# Patient Record
Sex: FEMALE | Race: WHITE | NOT HISPANIC OR LATINO | Employment: FULL TIME | URBAN - METROPOLITAN AREA
[De-identification: names, ages, dates, MRNs, and addresses within clinical notes are randomized per-mention and may not be internally consistent; named-entity substitution may affect disease eponyms.]

---

## 2017-03-30 ENCOUNTER — OFFICE VISIT (OUTPATIENT)
Dept: URGENT CARE | Facility: MEDICAL CENTER | Age: 27
End: 2017-03-30

## 2017-03-30 PROCEDURE — G0382 LEV 3 HOSP TYPE B ED VISIT: HCPCS

## 2017-04-28 ENCOUNTER — ALLSCRIPTS OFFICE VISIT (OUTPATIENT)
Dept: OTHER | Facility: OTHER | Age: 27
End: 2017-04-28

## 2018-01-14 VITALS
SYSTOLIC BLOOD PRESSURE: 109 MMHG | BODY MASS INDEX: 23.75 KG/M2 | HEIGHT: 60 IN | DIASTOLIC BLOOD PRESSURE: 66 MMHG | WEIGHT: 121 LBS

## 2020-02-26 ENCOUNTER — OCCMED (OUTPATIENT)
Dept: URGENT CARE | Facility: CLINIC | Age: 30
End: 2020-02-26
Payer: OTHER MISCELLANEOUS

## 2020-02-26 ENCOUNTER — APPOINTMENT (OUTPATIENT)
Dept: RADIOLOGY | Facility: CLINIC | Age: 30
End: 2020-02-26
Payer: OTHER MISCELLANEOUS

## 2020-02-26 DIAGNOSIS — S59.912A INJURY OF LEFT FOREARM, INITIAL ENCOUNTER: ICD-10-CM

## 2020-02-26 DIAGNOSIS — S51.812A LACERATION OF SKIN OF LEFT FOREARM, INITIAL ENCOUNTER: Primary | ICD-10-CM

## 2020-02-26 PROCEDURE — 99283 EMERGENCY DEPT VISIT LOW MDM: CPT

## 2020-02-26 PROCEDURE — 90471 IMMUNIZATION ADMIN: CPT

## 2020-02-26 PROCEDURE — G0382 LEV 3 HOSP TYPE B ED VISIT: HCPCS

## 2020-02-26 PROCEDURE — 90715 TDAP VACCINE 7 YRS/> IM: CPT

## 2020-02-26 PROCEDURE — 73090 X-RAY EXAM OF FOREARM: CPT

## 2020-02-26 RX ORDER — LIDOCAINE HYDROCHLORIDE 10 MG/ML
5 INJECTION, SOLUTION EPIDURAL; INFILTRATION; INTRACAUDAL; PERINEURAL ONCE
Status: COMPLETED | OUTPATIENT
Start: 2020-02-26 | End: 2020-02-26

## 2020-02-26 RX ORDER — GINSENG 100 MG
1 CAPSULE ORAL ONCE
Status: COMPLETED | OUTPATIENT
Start: 2020-02-26 | End: 2020-02-26

## 2020-02-26 RX ADMIN — LIDOCAINE HYDROCHLORIDE 5 ML: 10 INJECTION, SOLUTION EPIDURAL; INFILTRATION; INTRACAUDAL; PERINEURAL at 14:55

## 2020-02-26 RX ADMIN — Medication 1 SMALL APPLICATION: at 14:55

## 2020-02-26 NOTE — PROGRESS NOTES
Medical Record Disclaimer: This chart is being administratively closed as incomplete by [facility] Compliance Department  The provider responsible for completing the chart has left the organization and is not available to complete the chart  This encounter was created for [facility] orders only

## 2020-02-28 ENCOUNTER — OCCMED (OUTPATIENT)
Dept: URGENT CARE | Facility: CLINIC | Age: 30
End: 2020-02-28
Payer: OTHER MISCELLANEOUS

## 2020-02-28 DIAGNOSIS — S51.812D LACERATION OF FOREARM, LEFT, SUBSEQUENT ENCOUNTER: Primary | ICD-10-CM

## 2020-02-28 PROCEDURE — 99213 OFFICE O/P EST LOW 20 MIN: CPT | Performed by: PHYSICIAN ASSISTANT

## 2020-03-04 ENCOUNTER — OCCMED (OUTPATIENT)
Dept: URGENT CARE | Facility: CLINIC | Age: 30
End: 2020-03-04
Payer: OTHER MISCELLANEOUS

## 2020-03-04 DIAGNOSIS — S51.812D LACERATION OF FOREARM, LEFT, SUBSEQUENT ENCOUNTER: Primary | ICD-10-CM

## 2020-03-04 PROCEDURE — 99213 OFFICE O/P EST LOW 20 MIN: CPT | Performed by: PREVENTIVE MEDICINE

## 2020-03-11 ENCOUNTER — OCCMED (OUTPATIENT)
Dept: URGENT CARE | Facility: CLINIC | Age: 30
End: 2020-03-11
Payer: OTHER MISCELLANEOUS

## 2020-03-11 DIAGNOSIS — S51.812D LACERATION OF FOREARM, LEFT, SUBSEQUENT ENCOUNTER: Primary | ICD-10-CM

## 2020-03-11 PROCEDURE — 99213 OFFICE O/P EST LOW 20 MIN: CPT | Performed by: PREVENTIVE MEDICINE

## 2020-10-08 ENCOUNTER — ANNUAL EXAM (OUTPATIENT)
Dept: OBGYN CLINIC | Facility: MEDICAL CENTER | Age: 30
End: 2020-10-08
Payer: COMMERCIAL

## 2020-10-08 VITALS
BODY MASS INDEX: 25.09 KG/M2 | DIASTOLIC BLOOD PRESSURE: 72 MMHG | WEIGHT: 127.8 LBS | HEIGHT: 60 IN | TEMPERATURE: 99.1 F | SYSTOLIC BLOOD PRESSURE: 120 MMHG

## 2020-10-08 DIAGNOSIS — Z91.89 HIGH-RISK EXPOSURE TO INFECTIOUS PATIENT: ICD-10-CM

## 2020-10-08 DIAGNOSIS — Z20.9 HIGH-RISK EXPOSURE TO INFECTIOUS PATIENT: ICD-10-CM

## 2020-10-08 DIAGNOSIS — Z12.4 SCREENING FOR MALIGNANT NEOPLASM OF THE CERVIX: ICD-10-CM

## 2020-10-08 DIAGNOSIS — Z01.419 ROUTINE GYNECOLOGICAL EXAMINATION: Primary | ICD-10-CM

## 2020-10-08 PROCEDURE — 87624 HPV HI-RISK TYP POOLED RSLT: CPT | Performed by: ADVANCED PRACTICE MIDWIFE

## 2020-10-08 PROCEDURE — G0145 SCR C/V CYTO,THINLAYER,RESCR: HCPCS | Performed by: PATHOLOGY

## 2020-10-08 PROCEDURE — 99395 PREV VISIT EST AGE 18-39: CPT | Performed by: ADVANCED PRACTICE MIDWIFE

## 2020-10-08 PROCEDURE — G0124 SCREEN C/V THIN LAYER BY MD: HCPCS | Performed by: PATHOLOGY

## 2020-10-20 LAB
LAB AP GYN PRIMARY INTERPRETATION: ABNORMAL
Lab: ABNORMAL
PATH INTERP SPEC-IMP: ABNORMAL

## 2020-10-24 LAB
HPV HR 12 DNA CVX QL NAA+PROBE: POSITIVE
HPV16 DNA CVX QL NAA+PROBE: NEGATIVE
HPV18 DNA CVX QL NAA+PROBE: NEGATIVE

## 2020-10-27 ENCOUNTER — TELEPHONE (OUTPATIENT)
Dept: OBGYN CLINIC | Facility: MEDICAL CENTER | Age: 30
End: 2020-10-27

## 2020-11-04 ENCOUNTER — PROCEDURE VISIT (OUTPATIENT)
Dept: OBGYN CLINIC | Facility: MEDICAL CENTER | Age: 30
End: 2020-11-04
Payer: COMMERCIAL

## 2020-11-04 VITALS
DIASTOLIC BLOOD PRESSURE: 76 MMHG | BODY MASS INDEX: 24.99 KG/M2 | SYSTOLIC BLOOD PRESSURE: 124 MMHG | HEIGHT: 60 IN | WEIGHT: 127.3 LBS | TEMPERATURE: 99.4 F

## 2020-11-04 DIAGNOSIS — R87.610 ATYPICAL SQUAMOUS CELLS OF UNDETERMINED SIGNIFICANCE ON CYTOLOGIC SMEAR OF CERVIX (ASC-US): Primary | ICD-10-CM

## 2020-11-04 PROBLEM — R87.810 ATYPICAL SQUAMOUS CELL CHANGES OF UNDETERMINED SIGNIFICANCE (ASCUS) ON CERVICAL CYTOLOGY WITH POSITIVE HIGH RISK HUMAN PAPILLOMA VIRUS (HPV): Status: ACTIVE | Noted: 2020-11-04

## 2020-11-04 LAB — SL AMB POCT URINE HCG: NORMAL

## 2020-11-04 PROCEDURE — 57452 EXAM OF CERVIX W/SCOPE: CPT | Performed by: ADVANCED PRACTICE MIDWIFE

## 2020-11-04 PROCEDURE — 81025 URINE PREGNANCY TEST: CPT | Performed by: ADVANCED PRACTICE MIDWIFE

## 2020-11-10 ENCOUNTER — OFFICE VISIT (OUTPATIENT)
Dept: URGENT CARE | Facility: CLINIC | Age: 30
End: 2020-11-10
Payer: COMMERCIAL

## 2020-11-10 VITALS
HEART RATE: 84 BPM | SYSTOLIC BLOOD PRESSURE: 110 MMHG | DIASTOLIC BLOOD PRESSURE: 78 MMHG | TEMPERATURE: 96.5 F | RESPIRATION RATE: 16 BRPM | OXYGEN SATURATION: 99 % | HEIGHT: 60 IN | BODY MASS INDEX: 25.17 KG/M2 | WEIGHT: 128.2 LBS

## 2020-11-10 DIAGNOSIS — M25.562 POSTERIOR LEFT KNEE PAIN: Primary | ICD-10-CM

## 2020-11-10 PROCEDURE — G0382 LEV 3 HOSP TYPE B ED VISIT: HCPCS | Performed by: FAMILY MEDICINE

## 2020-11-11 ENCOUNTER — APPOINTMENT (OUTPATIENT)
Dept: RADIOLOGY | Facility: CLINIC | Age: 30
End: 2020-11-11
Payer: COMMERCIAL

## 2020-11-11 ENCOUNTER — OFFICE VISIT (OUTPATIENT)
Dept: OBGYN CLINIC | Facility: CLINIC | Age: 30
End: 2020-11-11
Payer: COMMERCIAL

## 2020-11-11 VITALS
DIASTOLIC BLOOD PRESSURE: 72 MMHG | TEMPERATURE: 97.5 F | HEIGHT: 60 IN | SYSTOLIC BLOOD PRESSURE: 116 MMHG | BODY MASS INDEX: 25.13 KG/M2 | WEIGHT: 128 LBS

## 2020-11-11 DIAGNOSIS — M25.562 LEFT KNEE PAIN, UNSPECIFIED CHRONICITY: ICD-10-CM

## 2020-11-11 DIAGNOSIS — M25.562 POSTERIOR LEFT KNEE PAIN: ICD-10-CM

## 2020-11-11 DIAGNOSIS — S76.312A STRAIN OF LEFT HAMSTRING MUSCLE, INITIAL ENCOUNTER: Primary | ICD-10-CM

## 2020-11-11 PROCEDURE — 73564 X-RAY EXAM KNEE 4 OR MORE: CPT

## 2020-11-11 PROCEDURE — 99203 OFFICE O/P NEW LOW 30 MIN: CPT | Performed by: ORTHOPAEDIC SURGERY

## 2020-11-12 ENCOUNTER — EVALUATION (OUTPATIENT)
Dept: PHYSICAL THERAPY | Facility: CLINIC | Age: 30
End: 2020-11-12
Payer: COMMERCIAL

## 2020-11-12 DIAGNOSIS — S76.312A STRAIN OF LEFT HAMSTRING MUSCLE, INITIAL ENCOUNTER: ICD-10-CM

## 2020-11-12 PROCEDURE — 97161 PT EVAL LOW COMPLEX 20 MIN: CPT | Performed by: PHYSICAL THERAPIST

## 2020-11-12 PROCEDURE — 97110 THERAPEUTIC EXERCISES: CPT | Performed by: PHYSICAL THERAPIST

## 2020-11-16 ENCOUNTER — OFFICE VISIT (OUTPATIENT)
Dept: PHYSICAL THERAPY | Facility: CLINIC | Age: 30
End: 2020-11-16
Payer: COMMERCIAL

## 2020-11-16 DIAGNOSIS — S76.312A STRAIN OF LEFT HAMSTRING MUSCLE, INITIAL ENCOUNTER: Primary | ICD-10-CM

## 2020-11-16 PROCEDURE — 97110 THERAPEUTIC EXERCISES: CPT | Performed by: PHYSICAL THERAPIST

## 2020-11-16 PROCEDURE — 97140 MANUAL THERAPY 1/> REGIONS: CPT | Performed by: PHYSICAL THERAPIST

## 2020-11-19 ENCOUNTER — OFFICE VISIT (OUTPATIENT)
Dept: PHYSICAL THERAPY | Facility: CLINIC | Age: 30
End: 2020-11-19
Payer: COMMERCIAL

## 2020-11-19 DIAGNOSIS — S76.312A STRAIN OF LEFT HAMSTRING MUSCLE, INITIAL ENCOUNTER: Primary | ICD-10-CM

## 2020-11-19 PROCEDURE — 97110 THERAPEUTIC EXERCISES: CPT | Performed by: PHYSICAL THERAPIST

## 2020-11-19 PROCEDURE — 97140 MANUAL THERAPY 1/> REGIONS: CPT | Performed by: PHYSICAL THERAPIST

## 2020-11-23 ENCOUNTER — OFFICE VISIT (OUTPATIENT)
Dept: PHYSICAL THERAPY | Facility: CLINIC | Age: 30
End: 2020-11-23
Payer: COMMERCIAL

## 2020-11-23 DIAGNOSIS — S76.312A STRAIN OF LEFT HAMSTRING MUSCLE, INITIAL ENCOUNTER: Primary | ICD-10-CM

## 2020-11-23 PROCEDURE — 97110 THERAPEUTIC EXERCISES: CPT | Performed by: PHYSICAL THERAPIST

## 2020-11-23 PROCEDURE — 97140 MANUAL THERAPY 1/> REGIONS: CPT | Performed by: PHYSICAL THERAPIST

## 2020-11-27 ENCOUNTER — OFFICE VISIT (OUTPATIENT)
Dept: PHYSICAL THERAPY | Facility: CLINIC | Age: 30
End: 2020-11-27
Payer: COMMERCIAL

## 2020-11-27 DIAGNOSIS — S76.312A STRAIN OF LEFT HAMSTRING MUSCLE, INITIAL ENCOUNTER: Primary | ICD-10-CM

## 2020-11-27 PROCEDURE — 97110 THERAPEUTIC EXERCISES: CPT | Performed by: PHYSICAL THERAPIST

## 2020-11-27 PROCEDURE — 97140 MANUAL THERAPY 1/> REGIONS: CPT | Performed by: PHYSICAL THERAPIST

## 2020-11-30 ENCOUNTER — OFFICE VISIT (OUTPATIENT)
Dept: PHYSICAL THERAPY | Facility: CLINIC | Age: 30
End: 2020-11-30
Payer: COMMERCIAL

## 2020-11-30 DIAGNOSIS — S76.312A STRAIN OF LEFT HAMSTRING MUSCLE, INITIAL ENCOUNTER: Primary | ICD-10-CM

## 2020-11-30 PROCEDURE — 97110 THERAPEUTIC EXERCISES: CPT | Performed by: PHYSICAL THERAPIST

## 2020-11-30 PROCEDURE — 97140 MANUAL THERAPY 1/> REGIONS: CPT | Performed by: PHYSICAL THERAPIST

## 2020-12-02 ENCOUNTER — OFFICE VISIT (OUTPATIENT)
Dept: PHYSICAL THERAPY | Facility: CLINIC | Age: 30
End: 2020-12-02
Payer: COMMERCIAL

## 2020-12-02 DIAGNOSIS — S76.312A STRAIN OF LEFT HAMSTRING MUSCLE, INITIAL ENCOUNTER: Primary | ICD-10-CM

## 2020-12-02 PROCEDURE — 97140 MANUAL THERAPY 1/> REGIONS: CPT | Performed by: PHYSICAL THERAPIST

## 2020-12-02 PROCEDURE — 97110 THERAPEUTIC EXERCISES: CPT | Performed by: PHYSICAL THERAPIST

## 2020-12-08 ENCOUNTER — OFFICE VISIT (OUTPATIENT)
Dept: PHYSICAL THERAPY | Facility: CLINIC | Age: 30
End: 2020-12-08
Payer: COMMERCIAL

## 2020-12-08 DIAGNOSIS — S76.312A STRAIN OF LEFT HAMSTRING MUSCLE, INITIAL ENCOUNTER: Primary | ICD-10-CM

## 2020-12-08 PROCEDURE — 97140 MANUAL THERAPY 1/> REGIONS: CPT | Performed by: PHYSICAL THERAPIST

## 2020-12-08 PROCEDURE — 97110 THERAPEUTIC EXERCISES: CPT | Performed by: PHYSICAL THERAPIST

## 2020-12-10 ENCOUNTER — EVALUATION (OUTPATIENT)
Dept: PHYSICAL THERAPY | Facility: CLINIC | Age: 30
End: 2020-12-10
Payer: COMMERCIAL

## 2020-12-10 DIAGNOSIS — S76.312A STRAIN OF LEFT HAMSTRING MUSCLE, INITIAL ENCOUNTER: Primary | ICD-10-CM

## 2020-12-10 PROCEDURE — 97140 MANUAL THERAPY 1/> REGIONS: CPT | Performed by: PHYSICAL THERAPIST

## 2020-12-10 PROCEDURE — 97110 THERAPEUTIC EXERCISES: CPT | Performed by: PHYSICAL THERAPIST

## 2020-12-21 ENCOUNTER — LAB (OUTPATIENT)
Dept: LAB | Facility: MEDICAL CENTER | Age: 30
End: 2020-12-21
Payer: COMMERCIAL

## 2020-12-21 ENCOUNTER — TELEPHONE (OUTPATIENT)
Dept: OBGYN CLINIC | Facility: MEDICAL CENTER | Age: 30
End: 2020-12-21

## 2020-12-21 DIAGNOSIS — N91.2 AMENORRHEA: Primary | ICD-10-CM

## 2020-12-21 DIAGNOSIS — N91.2 AMENORRHEA: ICD-10-CM

## 2020-12-21 LAB
ABO GROUP BLD: NORMAL
B-HCG SERPL-ACNC: ABNORMAL MIU/ML
BLD GP AB SCN SERPL QL: NEGATIVE
PROGEST SERPL-MCNC: 17.5 NG/ML
RH BLD: POSITIVE
SPECIMEN EXPIRATION DATE: NORMAL

## 2020-12-21 PROCEDURE — 86900 BLOOD TYPING SEROLOGIC ABO: CPT

## 2020-12-21 PROCEDURE — 86850 RBC ANTIBODY SCREEN: CPT

## 2020-12-21 PROCEDURE — 86901 BLOOD TYPING SEROLOGIC RH(D): CPT

## 2020-12-21 PROCEDURE — 84144 ASSAY OF PROGESTERONE: CPT

## 2020-12-21 PROCEDURE — 36415 COLL VENOUS BLD VENIPUNCTURE: CPT

## 2020-12-21 PROCEDURE — 84702 CHORIONIC GONADOTROPIN TEST: CPT

## 2020-12-23 ENCOUNTER — OFFICE VISIT (OUTPATIENT)
Dept: OBGYN CLINIC | Facility: CLINIC | Age: 30
End: 2020-12-23
Payer: COMMERCIAL

## 2020-12-23 VITALS
TEMPERATURE: 99.1 F | DIASTOLIC BLOOD PRESSURE: 66 MMHG | BODY MASS INDEX: 25.13 KG/M2 | SYSTOLIC BLOOD PRESSURE: 108 MMHG | WEIGHT: 128 LBS | HEIGHT: 60 IN

## 2020-12-23 DIAGNOSIS — S76.312D STRAIN OF LEFT HAMSTRING MUSCLE, SUBSEQUENT ENCOUNTER: Primary | ICD-10-CM

## 2020-12-23 PROCEDURE — 99213 OFFICE O/P EST LOW 20 MIN: CPT | Performed by: ORTHOPAEDIC SURGERY

## 2020-12-29 NOTE — PROGRESS NOTES
LMP  - 11/8/20  EDC by LMP is - 8/15/21 @ 8w 1D  Sonogram is consistent with dates  Pregnancy complicated by:    1  Abnormal pap - ASCUS + HSV - colpo normal no biopsy   2  HSV in partner - blood ordered and never done recommend to do now so can discuss suppression   3  Family history of breast cancer in mom - age 50  1  Grandfather of the baby daddy ?  Fragile X

## 2021-01-04 ENCOUNTER — ULTRASOUND (OUTPATIENT)
Dept: OBGYN CLINIC | Facility: MEDICAL CENTER | Age: 31
End: 2021-01-04
Payer: COMMERCIAL

## 2021-01-04 VITALS
BODY MASS INDEX: 26.11 KG/M2 | DIASTOLIC BLOOD PRESSURE: 82 MMHG | HEIGHT: 60 IN | WEIGHT: 133 LBS | SYSTOLIC BLOOD PRESSURE: 120 MMHG

## 2021-01-04 DIAGNOSIS — N91.2 AMENORRHEA: Primary | ICD-10-CM

## 2021-01-04 PROCEDURE — 99214 OFFICE O/P EST MOD 30 MIN: CPT | Performed by: OBSTETRICS & GYNECOLOGY

## 2021-01-11 NOTE — PATIENT INSTRUCTIONS
Pregnancy at 7 to [address]:   Changes happening to your body:  Pregnancy hormones may cause your body to go through many changes during this stage of your pregnancy  You may feel more tired than usual, and have mood swings, nausea and vomiting, and headaches  Your breasts may feel tender and swollen and you may urinate more frequently  Seek care immediately if:   · You have pain or cramping in your abdomen or low back  · You have heavy vaginal bleeding or clotting  · You pass material that looks like tissue or large clots  Collect the material and bring it with you  Call your doctor or obstetrician if:   · You have light bleeding  · You have chills or a fever  · You have vaginal itching, burning, or pain  · You have yellow, green, white, or foul-smelling vaginal discharge  · You have pain or burning when you urinate, less urine than usual, or pink or bloody urine  · You have questions or concerns about your condition or care  How to care for yourself at this stage of your pregnancy:   · Manage nausea and vomiting  Avoid fatty and spicy foods  Eat small meals throughout the day instead of large meals  Es may help to decrease nausea  Ask your healthcare provider about other ways of decreasing nausea and vomiting  · Eat a variety of healthy foods  Healthy foods include fruits, vegetables, whole-grain breads, low-fat dairy foods, beans, lean meats, and fish  Drink liquids as directed  Ask how much liquid to drink each day and which liquids are best for you  Limit caffeine to less than 200 milligrams each day  Limit your intake of fish to 2 servings each week  Choose fish low in mercury such as canned light tuna, shrimp, salmon, cod, or tilapia  Do not  eat fish high in mercury such as swordfish, tilefish, saadia mackerel, and shark  · Take prenatal vitamins as directed    Your need for certain vitamins and minerals, such as folic acid, increases during pregnancy  Prenatal vitamins provide some of the extra vitamins and minerals you need  Prenatal vitamins may also help to decrease the risk of certain birth defects  · Ask how much weight you should gain each month  Too much or too little weight gain can be unhealthy for you and your baby  · Do not smoke  Smoking increases your risk of a miscarriage and other health problems during your pregnancy  Smoking can cause your baby to be born too early or weigh less at birth  Quit smoking as soon as you think you might be pregnant  Ask your healthcare provider for information if you need help quitting  · Do not drink alcohol  Alcohol passes from your body to your baby through the placenta  It can affect your baby's brain development and cause fetal alcohol syndrome (FAS)  FAS is a group of conditions that causes mental, behavior, and growth problems  · Talk to your healthcare provider before you take any medicines  Many medicines may harm your baby if you take them when you are pregnant  Do not take any medicines, vitamins, herbs, or supplements without first talking to your healthcare provider  Never use illegal or street drugs (such as marijuana or cocaine) while you are pregnant  Safety tips during pregnancy:   · Avoid hot tubs and saunas  Do not use a hot tub or sauna while you are pregnant, especially during your first trimester  Hot tubs and saunas may raise your baby's temperature and increase the risk of birth defects  · Avoid toxoplasmosis  This is an infection caused by eating raw meat or being around infected cat feces  It can cause birth defects, miscarriages, and other problems  Wash your hands after you touch raw meat  Make sure any meat is well-cooked before you eat it  Avoid raw eggs and unpasteurized milk  Use gloves or ask someone else to clean your cat's litter box while you are pregnant      Changes that are happening with your baby:  By 10 weeks, your baby will be about 2½ inches long from the top of the head to the rump (baby's bottom)  Your baby weighs about ½ ounce  Major body organs, such as the brain, heart, and lungs, are forming  Your baby's facial features are also starting to form  Prenatal care:  Prenatal care is a series of visits with your healthcare provider throughout your pregnancy  During the first 28 weeks of your pregnancy, you will see your healthcare provider 1 time each month  Prenatal care can help prevent problems during pregnancy and childbirth  Your healthcare provider will check your blood pressure and weight  Your baby's heart rate will also be checked  You may also need the following at some visits:  · A pelvic exam  allows your healthcare provider to see your cervix (the bottom part of your uterus)  Your healthcare provider will use a speculum to open your vagina  He or she will check the size and shape of your uterus  You may also have a Pap smear at your first prenatal visit  This is a test to check your cervix for abnormal cells  · Blood tests  may be done to check for any of the following:     ? Gestational diabetes or anemia (low iron level)    ? Blood type or Rh factor, or certain birth defects    ? Immunity to certain diseases, such as chickenpox or rubella    ? An infection, such as a sexually transmitted infection, HIV, or hepatitis B    · Hepatitis B  may need to be prevented or treated  Hepatitis B is inflammation of the liver caused by the hepatitis B virus (HBV)  HBV can spread from a mother to her baby during delivery  You will be checked for HBV as early as possible in the first trimester of each pregnancy  You need the test even if you received the hepatitis B vaccine or were tested before  You may need to have an HBV infection treated before you give birth  · Urine tests  may also be done to check for sugar and protein  These can be signs of gestational diabetes or preeclampsia   Urine tests may also be done to check for signs of infection  · A fetal ultrasound  shows pictures of your baby inside your uterus  The pictures are used to check your baby's development, movement, and position  · Genetic disorder screening tests  may be offered to you  These screening tests check your baby's risk for genetic disorders such as Down syndrome  A screening test includes a blood test and ultrasound  Follow up with your doctor or obstetrician as directed:  Go to all prenatal visits  Write down your questions so you remember to ask them during your visits  © Copyright [address] Information is for End User's use only and may not be sold, redistributed or otherwise used for commercial purposes  All illustrations and images included in CareNotes® are the copyrighted property of A D A M , Inc  or [address] [place], [place] [address]eloy   The above information is an  only  It is not intended as medical advice for individual conditions or treatments  Talk to your doctor, nurse or pharmacist before following any medical regimen to see if it is safe and effective for you  Pregnancy at 11 to 14 Weeks   AMBULATORY CARE:   Changes happening to your body: You are now at the end of your first trimester and entering your second trimester  Morning sickness usually goes away by this time  You may have other symptoms such as fatigue, frequent urination, and headaches  You may have gained 2 to 4 pounds by now  Seek care immediately if:   · You have pain or cramping in your abdomen or low back  · You have heavy vaginal bleeding or clotting  · You pass material that looks like tissue or large clots  Collect the material and bring it with you  Call your doctor or obstetrician if:   · You cannot keep food or drinks down, and you are losing weight  · You have light vaginal bleeding  · You have chills or a fever  · You have vaginal itching, burning, or pain  · You have yellow, green, white, or foul-smelling vaginal discharge      · You have pain or burning when you urinate, less urine than usual, or pink or bloody urine  · You have questions or concerns about your condition or care  How to care for yourself at this stage of your pregnancy:   · Get plenty of rest   You may feel more tired than normal  You may need to take naps or go to bed earlier  · Manage nausea and vomiting  Avoid fatty and spicy foods  Eat small meals throughout the day instead of large meals  Es may help to decrease nausea  Ask your healthcare provider about other ways of decreasing nausea and vomiting  · Eat a variety of healthy foods  Healthy foods include fruits, vegetables, whole-grain breads, low-fat dairy foods, beans, lean meats, and fish  Drink liquids as directed  Ask how much liquid to drink each day and which liquids are best for you  Limit caffeine to less than 200 milligrams each day  Limit your intake of fish to 2 servings each week  Choose fish low in mercury such as canned light tuna, shrimp, salmon, cod, or tilapia  Do not  eat fish high in mercury such as swordfish, tilefish, saadia mackerel, and shark  · Take prenatal vitamins as directed  Your need for certain vitamins and minerals, such as folic acid, increases during pregnancy  Prenatal vitamins provide some of the extra vitamins and minerals you need  Prenatal vitamins may also help to decrease the risk of certain birth defects  · Do not smoke  Smoking increases your risk of a miscarriage and other health problems during your pregnancy  Smoking can cause your baby to be born too early or weigh less at birth  Ask your healthcare provider for information if you need help quitting  · Do not drink alcohol  Alcohol passes from your body to your baby through the placenta  It can affect your baby's brain development and cause fetal alcohol syndrome (FAS)  FAS is a group of conditions that causes mental, behavior, and growth problems       · Talk to your healthcare provider before you take any medicines  Many medicines may harm your baby if you take them when you are pregnant  Do not take any medicines, vitamins, herbs, or supplements without first talking to your healthcare provider  Never use illegal or street drugs (such as marijuana or cocaine) while you are pregnant  Safety tips during pregnancy:   · Avoid hot tubs and saunas  Do not use a hot tub or sauna while you are pregnant, especially during your first trimester  Hot tubs and saunas may raise your baby's temperature and increase the risk of birth defects  · Avoid toxoplasmosis  This is an infection caused by eating raw meat or being around infected cat feces  It can cause birth defects, miscarriages, and other problems  Wash your hands after you touch raw meat  Make sure any meat is well-cooked before you eat it  Avoid raw eggs and unpasteurized milk  Use gloves or ask someone else to clean your cat's litter box while you are pregnant  Changes happening with your baby: Your baby has fully formed fingernails and toenails  Your baby's heartbeat can now be heard  Ask your healthcare provider if you can listen to your baby's heartbeat  By week 14, your baby is over 4 inches long from the top of the head to the rump (baby's bottom)  Your baby weighs over 3 ounces  Prenatal care:  Prenatal care is a series of visits with your healthcare provider throughout your pregnancy  During the first 28 weeks of your pregnancy, you will see your healthcare provider 1 time each month  Prenatal care can help prevent problems during pregnancy and childbirth  Your healthcare provider will check your blood pressure and weight  Your baby's heart rate will also be checked  You may also need the following at some visits:  · A pelvic exam  allows your healthcare provider to see your cervix (the bottom part of your uterus)  Your healthcare provider will use a speculum to open your vagina   He or she will check the size and shape of your uterus  · Blood tests  may be done to check for any of the following:     ? Gestational diabetes or anemia (low iron level)    ? Blood type or Rh factor, or certain birth defects    ? Immunity to certain diseases, such as chickenpox or rubella    ? An infection, such as a sexually transmitted infection, HIV, or hepatitis B    · Hepatitis B  may need to be prevented or treated  Hepatitis B is inflammation of the liver caused by the hepatitis B virus (HBV)  HBV can spread from a mother to her baby during delivery  You will be checked for HBV as early as possible in the first trimester of each pregnancy  You need the test even if you received the hepatitis B vaccine or were tested before  You may need to have an HBV infection treated before you give birth  · Urine tests  may also be done to check for sugar and protein  These can be signs of gestational diabetes or preeclampsia  Urine tests may also be done to check for signs of infection  · A fetal ultrasound  shows pictures of your baby inside your uterus  The pictures are used to check your baby's development, movement, and position  · Genetic disorder screening tests  may be offered to you  These tests check your baby's risk for genetic disorders such as Down syndrome  A screening test includes a blood test and ultrasound  Follow up with your doctor or obstetrician as directed:  Go to all prenatal visits  Write down your questions so you remember to ask them during your visits  © Copyright [address] Information is for End User's use only and may not be sold, redistributed or otherwise used for commercial purposes  All illustrations and images included in CareNotes® are the copyrighted property of A D A M , Inc  or [address] [place] [address] Ronnie   The above information is an  only  It is not intended as medical advice for individual conditions or treatments   Talk to your doctor, nurse or pharmacist before following any medical regimen to see if it is safe and effective for you

## 2021-01-13 ENCOUNTER — INITIAL PRENATAL (OUTPATIENT)
Dept: OBGYN CLINIC | Facility: MEDICAL CENTER | Age: 31
End: 2021-01-13

## 2021-01-13 DIAGNOSIS — Z34.91 ENCOUNTER FOR PREGNANCY RELATED EXAMINATION IN FIRST TRIMESTER: Primary | ICD-10-CM

## 2021-01-13 DIAGNOSIS — Z11.3 SCREENING FOR STDS (SEXUALLY TRANSMITTED DISEASES): ICD-10-CM

## 2021-01-13 PROCEDURE — OBC: Performed by: STUDENT IN AN ORGANIZED HEALTH CARE EDUCATION/TRAINING PROGRAM

## 2021-01-13 NOTE — PROGRESS NOTES
OB INTAKE INTERVIEW      Pt presents for OB intake  Pre pregnancy [id number]=653 pounds      OB History    Para Term  AB Living   2       1 0   SAB TAB Ectopic Multiple Live Births   1              # Outcome Date GA Lbr Grupo/2nd Weight Sex Delivery Anes PTL Lv   2 Current            1 SAB      SAB         Birth Comments: early first trimester SAB         Hx of  delivery prior to 36 weeks 6 days:  NO   Last Menstrual Period:   Patient's last menstrual period was 2020 (exact date)  Ultrasound date:   2021 7 weeks 6 days  Estimated date of delivery:   Estimated Date of Delivery: 08/15/2021  confirmed by LMP ? History of Diabetes: denies  History of Hypertension: denies      Infection Screening: Does the pt have a hx of MRSA? denies    H&P visit scheduled  ?  Interview education  Information on [facility] Pregnancy Essentials reviewed  Handouts given: How to Access Pregnancy Essentials Guide  Baby and Me support center  [facility] in Pregnancy information sheet   COVID-19: precautions and travel restrictions reviewed  : Information on COVID-19 mRNA vaccine given    Interview education    [facility]  Discussed genetic testing-    - referral to MFM given   Referral togenetic counselor given d/t FOB having family history of Fragile X       - Information on CF and SMA carrier screening reviewed  Will let office know at next appointment if screening is desired    Discussed Tdap and Influenza vaccines  Declined flu shot today         Depression Screening Follow-up Plan: Patient's depression screening was NEGATIVE with an [place] score of    0                The patient was oriented to our practice and all questions were answered    Interviewed by: Deandre Duenas RN 21

## 2021-01-18 LAB
EXTERNAL HEPATITIS B SURFACE ANTIGEN: NEGATIVE
EXTERNAL RUBELLA IGG QUANTITATION: NEGATIVE

## 2021-02-03 ENCOUNTER — INITIAL PRENATAL (OUTPATIENT)
Dept: OBGYN CLINIC | Facility: MEDICAL CENTER | Age: 31
End: 2021-02-03

## 2021-02-03 VITALS — SYSTOLIC BLOOD PRESSURE: 116 MMHG | WEIGHT: 132 LBS | BODY MASS INDEX: 25.78 KG/M2 | DIASTOLIC BLOOD PRESSURE: 64 MMHG

## 2021-02-03 DIAGNOSIS — Z11.3 SCREENING EXAMINATION FOR STD (SEXUALLY TRANSMITTED DISEASE): ICD-10-CM

## 2021-02-03 DIAGNOSIS — Z3A.12 12 WEEKS GESTATION OF PREGNANCY: ICD-10-CM

## 2021-02-03 DIAGNOSIS — Z34.91 FIRST TRIMESTER PREGNANCY: Primary | ICD-10-CM

## 2021-02-03 PROCEDURE — PNV: Performed by: NURSE PRACTITIONER

## 2021-02-03 NOTE — PROGRESS NOTES
Víctor Villalpando is a 35y o  year old  at [address] [place] [address] for first prenatal visit  Pregnancy was desired  She is currently taking PNV    Nausea No Vomiting No   Exam done today - see OB flowsheet  Pap done No, due oct 2021  Gonorrhea and Chlamydia sent  Labs reviewed  [facility]  Sent for urine culture and HIV  Genetic testing: appt seq/screen feb [id number]     OB complications :       Pt has been counseled re diet, exercise, weight gain, foods to avoid, vaccines in pregnancy, trisomy screening, travel precautions to include seat belt use and VTE risk reduction  She has been provided our pregnancy packet which includes how and when to contact providers, medication recommendations, dietary suggestions, breastfeeding information as well as websites for additional information, hospital and delivery concerns

## 2021-02-09 ENCOUNTER — TELEPHONE (OUTPATIENT)
Dept: PERINATAL CARE | Facility: OTHER | Age: 31
End: 2021-02-09

## 2021-02-09 LAB — EXTERNAL HIV-1/2 AB-AG: NORMAL

## 2021-02-09 NOTE — TELEPHONE ENCOUNTER
Attempted to reach patient by phone and left voicemail to confirm appointment for MFM ultrasound  1 support person ( must be over the age of 15) may accompany you for your appointment  Please wear masks ( [place] [facility])  You and your support person will be screened upon arrival   IF not feeling well- cough, fever, shortness of breath or any flu like symptoms contact your primary care physician or [phone]  ? Please call our office prior to entering the building  Check in and rooming questions will be done via phone  Inside office # provided:  ?    [place] [facility] line: [phone] [facility] does not allow cell phone use, recording device or streaming during ultrasound     Any questions with these instructions please call Maternal Fetal Medicine nurse line today @ # [phone]

## 2021-02-10 ENCOUNTER — ROUTINE PRENATAL (OUTPATIENT)
Dept: PERINATAL CARE | Facility: OTHER | Age: 31
End: 2021-02-10
Payer: COMMERCIAL

## 2021-02-10 VITALS
DIASTOLIC BLOOD PRESSURE: 84 MMHG | HEIGHT: 60 IN | WEIGHT: 134.2 LBS | BODY MASS INDEX: 26.35 KG/M2 | SYSTOLIC BLOOD PRESSURE: 126 MMHG | HEART RATE: 111 BPM

## 2021-02-10 DIAGNOSIS — Z3A.13 13 WEEKS GESTATION OF PREGNANCY: ICD-10-CM

## 2021-02-10 DIAGNOSIS — Z36.82 ENCOUNTER FOR NUCHAL TRANSLUCENCY TESTING: Primary | ICD-10-CM

## 2021-02-10 PROCEDURE — 76813 OB US NUCHAL MEAS 1 GEST: CPT | Performed by: OBSTETRICS & GYNECOLOGY

## 2021-02-10 PROCEDURE — 99202 OFFICE O/P NEW SF 15 MIN: CPT | Performed by: OBSTETRICS & GYNECOLOGY

## 2021-02-10 NOTE — PROGRESS NOTES
The patient was seen today for an ultrasound  Please see ultrasound report (located under Ob Procedures) for additional details  Thank you very much for allowing us to participate in the care of this very nice patient  Should you have any questions, please do not hesitate to contact me  Darell Mclain MD [address] [facility]  Attending Physician, [place]

## 2021-02-10 NOTE — LETTER
February 10, 2021     Ajith Garrido MD  [address]     Patient: Gena Drummond   YOB: 1990   Date of Visit: 2/10/2021       Dear Dr Jina Girard: Thank you for referring Gena Drummond to me for evaluation  Below are my notes for this consultation  If you have questions, please do not hesitate to call me  I look forward to following your patient along with you  Sincerely,        Hartwell Halsted, MD        CC: No Recipients  Hartwell Halsted, MD  2/10/2021 11:17 AM  Sign when Signing Visit  The patient was seen today for an ultrasound  Please see ultrasound report (located under Ob Procedures) for additional details  Thank you very much for allowing us to participate in the care of this very nice patient  Should you have any questions, please do not hesitate to contact me  Darell Orosco MD [facility]  Attending Physician, [place]

## 2021-02-10 NOTE — PROGRESS NOTES
Patient chose to have Stepwise Part 1 Screening from [facility]  Instructed patient blood work must be collected no later than  Reviewed [facility] online appointment scheduling availability  Part 1 results will be available in approximately 7-10 business days  Maternal-Fetal Medicine will call patient with results or she can [facility]  Lab requisition will be mailed for Part 2 screening, ideal time for Part 2 collection is between 16-18 weeks gestation  Patient verbalized understanding of all instructions

## 2021-02-11 ENCOUNTER — TELEPHONE (OUTPATIENT)
Dept: OBGYN CLINIC | Facility: MEDICAL CENTER | Age: 31
End: 2021-02-11

## 2021-02-11 DIAGNOSIS — R82.71 GBS BACTERIURIA: Primary | ICD-10-CM

## 2021-02-11 RX ORDER — CEPHALEXIN 500 MG/1
500 CAPSULE ORAL EVERY 12 HOURS SCHEDULED
Qty: 14 CAPSULE | Refills: 0 | Status: SHIPPED | OUTPATIENT
Start: 2021-02-11 | End: 2021-02-18

## 2021-02-13 LAB
# FETUSES US: 1
AGE: NORMAL
B-HCG ADJ MOM SERPL: 0.68
B-HCG SERPL-ACNC: 50.7 IU/ML
COLLECT DATE: NORMAL
CURRENT SMOKER: NO
DONATED EGG PATIENT QL: NO
FET CRL US.MEAS: 76 MM
FET CRL US.MEAS: NORMAL MM
FET NUCHAL FOLD MOM THICKNESS US.MEAS: 1.03
FET NUCHAL FOLD THICKNESS US.MEAS: 1.8 MM
FET NUCHAL FOLD THICKNESS US.MEAS: NORMAL MM
FET TS 21 RISK FROM MAT AGE: NORMAL
GA CLIN EST: 13.4 WK
GA METHOD: NORMAL
HX OF NTD NARR: NO
HX OF TRISOMY 21 NARR: NO
IDDM PATIENT QL: NO
INTEGRATED SCN PATIENT-IMP: NORMAL
PAPP-A MOM SERPL: 0.48
PAPP-A SERPL-MCNC: 753.2 NG/ML
PHYSICIAN NPI: NORMAL
SL AMB NASAL BONE: PRESENT
SL AMB NTQR LOCATION ID#: NORMAL
SL AMB NTQR READING PHYS ID#: NORMAL
SL AMB REFERRING PHYSICIAN NAME: NORMAL
SL AMB REFERRING PHYSICIAN PHONE: NORMAL
SL AMB REPEAT SPECIMEN: NO
SL AMB TWIN B NASAL BONE: NORMAL
SONOGRAPHER NAME: NORMAL
SONOGRAPHER: NORMAL
SONOGRAPHER: NORMAL
TS 18 RISK FETUS: NORMAL
TS 21 RISK FETUS: NORMAL
US DATE: NORMAL
US FETUSES STUDY [IMP]: NORMAL

## 2021-02-16 ENCOUNTER — TELEPHONE (OUTPATIENT)
Dept: PERINATAL CARE | Facility: OTHER | Age: 31
End: 2021-02-16

## 2021-02-16 NOTE — TELEPHONE ENCOUNTER
VMM left on number on file in communication consent regarding Stepwise pt 1 WNL   Instructions provided on collection of part 2 during optimal date range and instructions included in [facility]  sent out today

## 2021-02-16 NOTE — TELEPHONE ENCOUNTER
----- Message from Zen Pedraza MD sent at 2/16/2021 10:17 AM EST -----  First-trimester genetic screening results are reassuring  Please notify patient of normal results if she has not viewed them in MyChart and insure she has instructions to complete the 2nd trimester portion of her sequential screening

## 2021-02-16 NOTE — RESULT ENCOUNTER NOTE
First-trimester genetic screening results are reassuring  Please notify patient of normal results if she has not viewed them in MyChart and insure she has instructions to complete the 2nd trimester portion of her sequential screening

## 2021-03-01 LAB
# FETUSES US: 1
AFP ADJ MOM SERPL: 1.02
AFP SERPL-MCNC: 33.9 NG/ML
B-HCG ADJ MOM SERPL: 0.92
B-HCG SERPL-ACNC: 38.7 IU/ML
COLLECT DATE: NORMAL
CURRENT SMOKER: NO
FET CRL US.MEAS: 76 MM
FET NUCHAL FOLD MOM THICKNESS US.MEAS: 1.03
FET NUCHAL FOLD THICKNESS US.MEAS: 1.8 MM
FET TS 21 RISK FROM MAT AGE: NORMAL
GA CLIN EST: 15.6 WK
HX OF NTD NARR: NORMAL
IDDM PATIENT QL: NORMAL
INHIBIN A ADJ MOM SERPL: 1.14
INHIBIN A SERPL-MCNC: 195 PG/ML
INTEGRATED SCN PATIENT-IMP: NORMAL
NEURAL TUBE DEFECT RISK FETUS: NORMAL %
PAPP-A MOM SERPL: 0.48
PAPP-A SERPL-MCNC: 753.2 NG/ML
PHYSICIAN NPI: NORMAL
SL AMB NASAL BONE: PRESENT
SL AMB REFERRING PHYSICIAN NAME: NORMAL
SL AMB REFERRING PHYSICIAN PHONE: NORMAL
SL AMB REPEAT SPECIMEN: NORMAL
SL AMB SPECIMEN # FROM PART 1: NORMAL
SL AMB TWIN B NASAL BONE: NORMAL
TS 18 RISK FETUS: NORMAL
TS 21 RISK FETUS: NORMAL
U ESTRIOL ADJ MOM SERPL: 0.8
U ESTRIOL SERPL-MCNC: 0.57 NG/ML
US DATE: NORMAL

## 2021-03-02 NOTE — RESULT ENCOUNTER NOTE
I have reviewed the results which are low risk  Please call patient and notify her of reassuring results if she has not viewed on Jig[employer]  Thank you

## 2021-03-03 ENCOUNTER — ROUTINE PRENATAL (OUTPATIENT)
Dept: OBGYN CLINIC | Facility: MEDICAL CENTER | Age: 31
End: 2021-03-03

## 2021-03-03 VITALS — BODY MASS INDEX: 26.72 KG/M2 | SYSTOLIC BLOOD PRESSURE: 120 MMHG | DIASTOLIC BLOOD PRESSURE: 70 MMHG | WEIGHT: 136.8 LBS

## 2021-03-03 DIAGNOSIS — Z3A.16 16 WEEKS GESTATION OF PREGNANCY: ICD-10-CM

## 2021-03-03 DIAGNOSIS — R82.71 GBS BACTERIURIA: Primary | ICD-10-CM

## 2021-03-03 DIAGNOSIS — Z34.02 ENCOUNTER FOR SUPERVISION OF NORMAL FIRST PREGNANCY IN SECOND TRIMESTER: ICD-10-CM

## 2021-03-03 PROCEDURE — PNV: Performed by: OBSTETRICS & GYNECOLOGY

## 2021-03-03 NOTE — PROGRESS NOTES
Routine Prenatal Visit  [facility] [address] [place] [address]    Assessment/Plan:  Benny Navarro is a 35y o  year old  at [facility] who presents for routine prenatal visit  1  GBS bacteriuria    2  Encounter for supervision of normal first pregnancy in second trimester    3  16 weeks gestation of pregnancy          Subjective:     CC: Prenatal care    Stephen Armendariz is a 35 y o  Saba Chelsie female who presents for routine prenatal care at [facility]  Pregnancy ROS: no leakage of fluid, pelvic pain, or vaginal bleeding  no fetal movement yet    Urine culture sent today for HAKAN  Sequential screen reviewed     The following portions of the patient's history were reviewed and updated as appropriate: allergies, current medications, past family history, past medical history, obstetric history, gynecologic history, past social history, past surgical history and problem list       Objective:  /70   Wt 62 1 kg (136 lb 12 8 oz)   LMP 2020 (Exact Date)   BMI 26 72 kg/m²   Pregravid Weight/BMI: 56 7 kg (125 lb) (BMI 24 41)  Current Weight: 62 1 kg (136 lb 12 8 oz)   Total Weight Gain: 5 352 kg (11 lb 12 8 oz)   Pre-Chavez Vitals      Most Recent Value   Prenatal Assessment   Fetal Heart Rate  152   Prenatal Vitals   Blood Pressure  120/70   Weight - Scale  62 1 kg (136 lb 12 8 oz)   Urine Albumin/Glucose   Dilation/Effacement/Station   Vaginal Drainage   Edema   LLE Edema  None   RLE Edema  None           General: Well appearing, no distress  Respiratory: Unlabored breathing  Cardiovascular: Regular rate  Abdomen: Soft, gravid, nontender  Fundal Height: Appropriate for gestational age  Extremities: Warm and well perfused  Non tender

## 2021-03-31 ENCOUNTER — ROUTINE PRENATAL (OUTPATIENT)
Dept: OBGYN CLINIC | Facility: MEDICAL CENTER | Age: 31
End: 2021-03-31

## 2021-03-31 ENCOUNTER — ROUTINE PRENATAL (OUTPATIENT)
Dept: PERINATAL CARE | Facility: CLINIC | Age: 31
End: 2021-03-31
Payer: COMMERCIAL

## 2021-03-31 VITALS — WEIGHT: 143.2 LBS | BODY MASS INDEX: 27.97 KG/M2 | DIASTOLIC BLOOD PRESSURE: 70 MMHG | SYSTOLIC BLOOD PRESSURE: 115 MMHG

## 2021-03-31 VITALS
DIASTOLIC BLOOD PRESSURE: 76 MMHG | WEIGHT: 143.6 LBS | SYSTOLIC BLOOD PRESSURE: 126 MMHG | BODY MASS INDEX: 28.19 KG/M2 | HEIGHT: 60 IN | HEART RATE: 70 BPM

## 2021-03-31 DIAGNOSIS — Z36.86 ENCOUNTER FOR ANTENATAL SCREENING FOR CERVICAL LENGTH: ICD-10-CM

## 2021-03-31 DIAGNOSIS — Z3A.20 20 WEEKS GESTATION OF PREGNANCY: Primary | ICD-10-CM

## 2021-03-31 DIAGNOSIS — Z36.3 ENCOUNTER FOR ANTENATAL SCREENING FOR MALFORMATIONS: Primary | ICD-10-CM

## 2021-03-31 DIAGNOSIS — Z3A.20 20 WEEKS GESTATION OF PREGNANCY: ICD-10-CM

## 2021-03-31 PROCEDURE — PNV: Performed by: STUDENT IN AN ORGANIZED HEALTH CARE EDUCATION/TRAINING PROGRAM

## 2021-03-31 PROCEDURE — 99213 OFFICE O/P EST LOW 20 MIN: CPT | Performed by: OBSTETRICS & GYNECOLOGY

## 2021-03-31 PROCEDURE — 76805 OB US >/= 14 WKS SNGL FETUS: CPT | Performed by: OBSTETRICS & GYNECOLOGY

## 2021-03-31 PROCEDURE — 76817 TRANSVAGINAL US OBSTETRIC: CPT | Performed by: OBSTETRICS & GYNECOLOGY

## 2021-03-31 NOTE — PROGRESS NOTES
Ultrasound Probe Disinfection    A transvaginal ultrasound was performed  Prior to use, disinfection was performed with High Level Disinfection Process ([employer])  Probe serial number [id number]: [id number] was used        Juventino Webb  03/31/21  12:57 PM

## 2021-03-31 NOTE — ASSESSMENT & PLAN NOTE
- s/p normal Sequential Screen  - GBS bacteruria in first trimester, will need PCN in labor  - ASCUS HPV pos pap s/p normal colpo, will have follow up postpartum  - Discussed MSAFP today, patient declines  - Level 2 anatomic survey scheduled for later today  - Discussed healthy weight gain goals in pregnancy, TWG to date is 18 lbs

## 2021-03-31 NOTE — PATIENT INSTRUCTIONS
Thank you for choosing us for your  care today  If you have any questions about your ultrasound or care, please do not hesitate to contact us or your primary obstetrician  Some general instructions for your pregnancy are:     Protect against coronavirus: Continue to practice social distancing, wear a mask, and wash your hands often  Pregnant women are increased risk of severe COVID  Notify your primary care doctor if you have any symptoms including cough, shortness of breath or difficulty breathing, fever, chills, muscle pain, sore throat, or loss of taste or smell  Pregnant women can receive the coronavirus vaccine   Exercise: Aim for 22 minutes per day (150 minutes per week) of regular exercise  Walking is great!  Nutrition: aim for calcium-rich and iron-rich foods as well as healthy sources of protein   Protect against the flu: get yourself and your entire household vaccinated against influenza  This will protect your baby   Learn about Preeclampsia: preeclampsia is a common, serious high blood pressure complication in pregnancy  A blood pressure of 895ALTN (systolic or top number) or 47QPFH (diastolic or bottom number) is not normal and needs evaluation by your doctor   If you smoke, try to reduce how many cigarettes you smoke or try to quit completely  Do not vape   Other warning signs to watch out for in pregnancy or postpartum: chest pain, obstructed breathing or shortness of breath, seizures, thoughts of hurting yourself or your baby, bleeding, a painful or swollen leg, fever, or headache (see [facility] POST-BIRTH Warning Signs campaign)  If these happen call 911  Itching is also not normal in pregnancy and if you experience this, especially over your hands and feet, potentially worse at night, notify your doctors     Lastly, if you are contacted regarding participation in a survey about your experience in our office, please know that we take any feedback you provide seriously and use it to improve how we deliver care through our center

## 2021-03-31 NOTE — PROGRESS NOTES
Routine Prenatal Visit  [facility] [address] [place] [address]    Assessment/Plan:  Hosea Zambrano is a 35y o  year old  at 20w3d who presents for routine prenatal visit  1  20 weeks gestation of pregnancy  Assessment & Plan:  - s/p normal Sequential Screen  - GBS bacteruria in first trimester, will need PCN in labor  - ASCUS HPV pos pap s/p normal colpo, will have follow up postpartum  - Discussed MSAFP today, patient declines  - Level 2 anatomic survey scheduled for later today  - Discussed healthy weight gain goals in pregnancy, TWG to date is 18 lbs          Subjective:     CC: Prenatal care    Candice Perry is a 35 y o   female who presents for routine prenatal care at 19w4d  Pregnancy ROS: Denies leakage of fluid, pelvic pain, or vaginal bleeding  Denies fetal movement yet  The following portions of the patient's history were reviewed and updated as appropriate: allergies, current medications, past family history, past medical history, obstetric history, gynecologic history, past social history, past surgical history and problem list       Objective:  /70   Wt 65 kg (143 lb 3 2 oz)   LMP 2020 (Exact Date)   BMI 27 97 kg/m²   Pregravid Weight/BMI: 56 7 kg (125 lb) (BMI 24 41)  Current Weight: 65 kg (143 lb 3 2 oz)   Total Weight Gain: 8 255 kg (18 lb 3 2 oz)   Pre- Vitals      Most Recent Value   Prenatal Assessment   Fetal Heart Rate  156   Prenatal Vitals   Blood Pressure  115/70   Weight - Scale  65 kg (143 lb 3 2 oz)   Urine Albumin/Glucose   Dilation/Effacement/Station   Vaginal Drainage   Edema   LLE Edema  None   RLE Edema  None           General: Well appearing, no distress  Respiratory: Unlabored breathing  Cardiovascular: Regular rate  Abdomen: Soft, gravid, nontender  Fundal Height: Appropriate for gestational age  Extremities: Warm and well perfused  Non tender      Kee Canas MD  [facility]  [facility] [address]  3/31/2021 8:19 AM

## 2021-05-17 ENCOUNTER — ROUTINE PRENATAL (OUTPATIENT)
Dept: OBGYN CLINIC | Facility: MEDICAL CENTER | Age: 31
End: 2021-05-17

## 2021-05-17 VITALS — BODY MASS INDEX: 29.1 KG/M2 | WEIGHT: 149 LBS | DIASTOLIC BLOOD PRESSURE: 70 MMHG | SYSTOLIC BLOOD PRESSURE: 120 MMHG

## 2021-05-17 DIAGNOSIS — Z34.92 SECOND TRIMESTER PREGNANCY: ICD-10-CM

## 2021-05-17 DIAGNOSIS — R82.71 GBS BACTERIURIA: ICD-10-CM

## 2021-05-17 DIAGNOSIS — Z3A.27 27 WEEKS GESTATION OF PREGNANCY: Primary | ICD-10-CM

## 2021-05-17 PROCEDURE — PNV: Performed by: OBSTETRICS & GYNECOLOGY

## 2021-05-17 NOTE — PROGRESS NOTES
Assessment  35 y o   at 27w1d presenting for routine prenatal visit  Plan  Diagnoses and all orders for this visit:    27 weeks gestation of pregnancy  Second trimester pregnancy  -  labor precautions  - [address] teaching done  - Birth plan given, peds list given, birth consent given  - Glucose beverage provided; labs next visit  - Rhogam not indicated  - Return in 2wks for PN / 28wk labs    GBS bacteriuria  - HAKAN negative  - Prophylaxis needed in labor      ____________________________________________________________        Subjective    Myron Suarez is a 35 y o   at 27w1d who presents for routine prenatal visit  She is without complaint  She denies contractions, loss of fluid, or vaginal bleeding  She feels regular fetal movements, light still but present  Pregnancy Problems:  Patient Active Problem List   Diagnosis    Atypical squamous cell changes of undetermined significance (ASCUS) on cervical cytology with positive high risk human papilloma virus (HPV)    Posterior left knee pain    Left hamstring muscle strain    GBS bacteriuria    Encounter for supervision of normal first pregnancy in second trimester    27 weeks gestation of pregnancy         Objective  /70   Wt 67 6 kg (149 lb)   LMP 2020 (Exact Date)   BMI 29 10 kg/m²     FHT: 140 BPM   Uterine Size: size equals dates     Physical Exam:  Physical Exam  Constitutional:       General: She is not in acute distress  Appearance: Normal appearance  She is well-developed  She is not ill-appearing, toxic-appearing or diaphoretic  HENT:      Head: Normocephalic and atraumatic  Eyes:      General: No scleral icterus  Right eye: No discharge  Left eye: No discharge  Conjunctiva/sclera: Conjunctivae normal    Pulmonary:      Effort: Pulmonary effort is normal  No accessory muscle usage or respiratory distress  Abdominal:      General: There is distension (gravid)  Tenderness:  There is no abdominal tenderness  There is no guarding or rebound  Skin:     General: Skin is warm and dry  Coloration: Skin is not jaundiced  Findings: No bruising, erythema or rash  Neurological:      Mental Status: She is alert  Psychiatric:         Mood and Affect: Mood normal          Behavior: Behavior normal          Thought Content:  Thought content normal          Judgment: Judgment normal

## 2021-05-24 ENCOUNTER — ROUTINE PRENATAL (OUTPATIENT)
Dept: OBGYN CLINIC | Facility: MEDICAL CENTER | Age: 31
End: 2021-05-24

## 2021-05-24 ENCOUNTER — TELEPHONE (OUTPATIENT)
Dept: OBGYN CLINIC | Facility: MEDICAL CENTER | Age: 31
End: 2021-05-24

## 2021-05-24 VITALS — BODY MASS INDEX: 29.29 KG/M2 | DIASTOLIC BLOOD PRESSURE: 80 MMHG | SYSTOLIC BLOOD PRESSURE: 110 MMHG | WEIGHT: 150 LBS

## 2021-05-24 DIAGNOSIS — Z3A.28 28 WEEKS GESTATION OF PREGNANCY: Primary | ICD-10-CM

## 2021-05-24 PROCEDURE — PNV: Performed by: STUDENT IN AN ORGANIZED HEALTH CARE EDUCATION/TRAINING PROGRAM

## 2021-05-24 NOTE — ASSESSMENT & PLAN NOTE
- Glucose tolerance test and CBC not collected today because patient vomited, wants to present to lab for testing  - Delivery consent discussed in detail and signed in the office today  - Patient was given Stan Taylor, list of pediatric providers, and fetal kick count education  - Tdap declined today

## 2021-05-24 NOTE — TELEPHONE ENCOUNTER
Pt lvm, has appt today and got sick after taking glucose drink  LVM for pt stating that she can still come for appt but we would just have her repeat test next time

## 2021-05-24 NOTE — PROGRESS NOTES
Routine Prenatal Visit  [facility] [address] [place] [address]    Assessment/Plan:  Milton Campbell is a 35y o  year old  at 29w1d who presents for routine prenatal visit  1  28 weeks gestation of pregnancy  Assessment & Plan:  - Glucose tolerance test and CBC not collected today because patient vomited, wants to present to lab for testing  - Delivery consent discussed in detail and signed in the office today  - Patient was given Stan Taylor, list of pediatric providers, and fetal kick count education  - Tdap declined today    Orders:  -     CBC and Platelet; Future  -     Glucose, 1H PG; Future        Subjective:     CC: Prenatal care    Anjum Paiz is a 35 y o   female who presents for routine prenatal care at 28w1d  Pregnancy ROS: Denies leakage of fluid, pelvic pain, or vaginal bleeding  Reports normal fetal movement  The following portions of the patient's history were reviewed and updated as appropriate: allergies, current medications, past family history, past medical history, obstetric history, gynecologic history, past social history, past surgical history and problem list       Objective:  /80   Wt 68 kg (150 lb)   LMP 2020 (Exact Date)   BMI 29 29 kg/m²   Pregravid Weight/BMI: 56 7 kg (125 lb) (BMI 24 41)  Current Weight: 68 kg (150 lb)   Total Weight Gain: 11 3 kg (25 lb)   Pre-Chavez Vitals      Most Recent Value   Prenatal Assessment   Fetal Heart Rate  144   Movement  Present   Prenatal Vitals   Blood Pressure  110/80   Weight - Scale  68 kg (150 lb)   Urine Albumin/Glucose   Dilation/Effacement/Station   Vaginal Drainage   Edema   LLE Edema  None   RLE Edema  None           General: Well appearing, no distress  Respiratory: Unlabored breathing  Cardiovascular: Regular rate  Abdomen: Soft, gravid, nontender  Fundal Height: Appropriate for gestational age  Extremities: Warm and well perfused  Non tender      Harle Slicker, MD  [address] [facility]  5/24/2021 2:33 PM

## 2021-05-26 ENCOUNTER — APPOINTMENT (OUTPATIENT)
Dept: LAB | Facility: MEDICAL CENTER | Age: 31
End: 2021-05-26
Payer: COMMERCIAL

## 2021-05-26 DIAGNOSIS — Z3A.28 28 WEEKS GESTATION OF PREGNANCY: ICD-10-CM

## 2021-05-26 LAB — GLUCOSE 1H P 50 G GLC PO SERPL-MCNC: 147 MG/DL (ref 40–134)

## 2021-05-26 PROCEDURE — 82950 GLUCOSE TEST: CPT

## 2021-05-26 PROCEDURE — 36415 COLL VENOUS BLD VENIPUNCTURE: CPT

## 2021-05-27 ENCOUNTER — TELEPHONE (OUTPATIENT)
Dept: OBGYN CLINIC | Facility: MEDICAL CENTER | Age: 31
End: 2021-05-27

## 2021-05-27 DIAGNOSIS — O99.810 ABNORMAL GLUCOSE AFFECTING PREGNANCY: Primary | ICD-10-CM

## 2021-05-27 NOTE — TELEPHONE ENCOUNTER
The patient was advised of the test results and she will be going to [facility] for her 3 hour glucose test

## 2021-05-27 NOTE — TELEPHONE ENCOUNTER
The patient called back and was made aware that she will need to go for a three hour glucose tolerance test and she will be by to  the script

## 2021-05-27 NOTE — TELEPHONE ENCOUNTER
----- Message from Mg Dukes MD sent at 5/26/2021  9:41 PM EDT -----  Please call the patient regarding her abnormal glucose and have her complete the 3 hour test

## 2021-06-01 ENCOUNTER — TELEPHONE (OUTPATIENT)
Dept: OBGYN CLINIC | Facility: MEDICAL CENTER | Age: 31
End: 2021-06-01

## 2021-06-01 NOTE — TELEPHONE ENCOUNTER
Patient called in regarding 3 hour test stated she threw up the drink wants to know how to avoid the 3 hour testing

## 2021-06-10 ENCOUNTER — ROUTINE PRENATAL (OUTPATIENT)
Dept: OBGYN CLINIC | Facility: MEDICAL CENTER | Age: 31
End: 2021-06-10

## 2021-06-10 VITALS — DIASTOLIC BLOOD PRESSURE: 78 MMHG | WEIGHT: 158.9 LBS | BODY MASS INDEX: 31.03 KG/M2 | SYSTOLIC BLOOD PRESSURE: 118 MMHG

## 2021-06-10 DIAGNOSIS — O99.810 ABNORMAL GLUCOSE AFFECTING PREGNANCY: ICD-10-CM

## 2021-06-10 DIAGNOSIS — Z3A.30 30 WEEKS GESTATION OF PREGNANCY: Primary | ICD-10-CM

## 2021-06-10 DIAGNOSIS — R82.71 GBS BACTERIURIA: ICD-10-CM

## 2021-06-10 PROCEDURE — PNV: Performed by: ADVANCED PRACTICE MIDWIFE

## 2021-06-10 NOTE — PATIENT INSTRUCTIONS
Pregnancy at 31 to 34 Weeks   AMBULATORY CARE:   What changes are happening to your body: You may continue to have symptoms such as shortness of breath, heartburn, contractions, or swelling of your ankles and feet  You may be gaining about 1 pound a week now  Seek care immediately if:   · You develop a severe headache that does not go away  · You have new or increased vision changes, such as blurred or spotted vision  · You have new or increased swelling in your face or hands  · You have vaginal spotting or bleeding  · Your water broke or you feel warm water gushing or trickling from your vagina  Contact your healthcare provider if:   · You have more than 5 contractions in 1 hour  · You notice any changes in your baby's movements  · You have abdominal cramps, pressure, or tightening  · You have a change in vaginal discharge  · You have chills or a fever  · You have vaginal itching, burning, or pain  · You have yellow, green, white, or foul-smelling vaginal discharge  · You have pain or burning when you urinate, less urine than usual, or pink or bloody urine  · You have questions or concerns about your condition or care  How to care for yourself at this stage of your pregnancy:   · Eat a variety of healthy foods  Healthy foods include fruits, vegetables, whole-grain breads, low-fat dairy foods, beans, lean meats, and fish  Drink liquids as directed  Ask how much liquid to drink each day and which liquids are best for you  Limit caffeine to less than 200 milligrams each day  Limit your intake of fish to 2 servings each week  Choose fish low in mercury such as canned light tuna, shrimp, salmon, cod, or tilapia  Do not  eat fish high in mercury such as swordfish, tilefish, saadia mackerel, and shark  · Manage heartburn  by eating 4 or 5 small meals each day instead of large meals  Avoid spicy food  · Manage swelling  by lying down and putting your feet up  · Take prenatal vitamins as directed  Your need for certain vitamins and minerals, such as folic acid, increases during pregnancy  Prenatal vitamins provide some of the extra vitamins and minerals you need  Prenatal vitamins may also help to decrease the risk of certain birth defects  · Talk to your healthcare provider about exercise  Moderate exercise can help you stay fit  Your healthcare provider will help you plan an exercise program that is safe for you during pregnancy  · Do not smoke  Smoking increases your risk of a miscarriage and other health problems during your pregnancy  Smoking can cause your baby to be born too early or weigh less at birth  Ask your healthcare provider for information if you need help quitting  · Do not drink alcohol  Alcohol passes from your body to your baby through the placenta  It can affect your baby's brain development and cause fetal alcohol syndrome (FAS)  FAS is a group of conditions that causes mental, behavior, and growth problems  · Talk to your healthcare provider before you take any medicines  Many medicines may harm your baby if you take them when you are pregnant  Do not take any medicines, vitamins, herbs, or supplements without first talking to your healthcare provider  Never use illegal or street drugs (such as marijuana or cocaine) while you are pregnant  Safety tips during pregnancy:   · Avoid hot tubs and saunas  Do not use a hot tub or sauna while you are pregnant, especially during your first trimester  Hot tubs and saunas may raise your baby's temperature and increase the risk of birth defects  · Avoid toxoplasmosis  This is an infection caused by eating raw meat or being around infected cat feces  It can cause birth defects, miscarriages, and other problems  Wash your hands after you touch raw meat  Make sure any meat is well-cooked before you eat it  Avoid raw eggs and unpasteurized milk   Use gloves or ask someone else to clean your cat's litter box while you are pregnant  Changes that are happening with your baby:  By 34 weeks, your baby may weigh more than 5 pounds  Your baby will be about 12 ½ inches long from the top of the head to the rump (baby's bottom)  Your baby is gaining about ½ pound a week  Your baby's eyes open and close now  Your baby's kicks and movements are more forceful at this time  What you need to know about prenatal care: Your healthcare provider will check your blood pressure and weight  You may also need the following:  · A urine test  may also be done to check for sugar and protein  These can be signs of gestational diabetes or infection  Protein in your urine may also be a sign of preeclampsia  Preeclampsia is a condition that can develop during week 20 or later of your pregnancy  It causes high blood pressure, and it can cause problems with your kidneys and other organs  · A Tdap vaccine  may be recommended by your healthcare provider  · Fundal height  is a measurement of your uterus to check your baby's growth  This number is usually the same as the number of weeks that you have been pregnant  Your healthcare provider may also check your baby's position  · Your baby's heart rate  will be checked  © Copyright [address] Information is for End User's use only and may not be sold, redistributed or otherwise used for commercial purposes  All illustrations and images included in CareNotes® are the copyrighted property of A D A M , Inc  or [facility] [place] [place]   The above information is an  only  It is not intended as medical advice for individual conditions or treatments  Talk to your doctor, nurse or pharmacist before following any medical regimen to see if it is safe and effective for you

## 2021-06-10 NOTE — PROGRESS NOTES
Routine Prenatal Visit  [facility] [address] [place] [address]    Assessment/Plan:  Mark Nice is a 35y o  year old  at 30w3d who presents for routine prenatal visit  1  30 weeks gestation of pregnancy  -     Blood Glucose Monitoring Suppl (Contour Next EZ) w/Device KIT; Use 1 kit 4 (four) times a day  -     glucose blood (Contour Next Test) test strip; Use as instructed  -     Microlet Lancets MISC; Use 4 (four) times a day    2  Abnormal glucose affecting pregnancy  -     Blood Glucose Monitoring Suppl (Contour Next EZ) w/Device KIT; Use 1 kit 4 (four) times a day  -     glucose blood (Contour Next Test) test strip; Use as instructed  -     Microlet Lancets MISC; Use 4 (four) times a day    3  GBS bacteriuria    - needs to complete 3hr gtt-  Vomited glucola for 1 hour and repeated, vomited glucola during 3 hour requesting other options  Will order glucometer and have her due fasting and 2 hour PP for 7 days- 4 times per day  To chart results and call  results in next week  Mark Nice states that she knows how to test    - TDAP - does not desire vaccine  -  Birth plan - will bring to next visit  - reviewed s/s PTL/FKC  - next visit 2 weeks      Subjective:     CC: Prenatal care    Sam Cr is a 35 y o   female who presents for routine prenatal care at 30w3d  Pregnancy ROS: no leakage of fluid, pelvic pain, or vaginal bleeding  Good fetal movement      The following portions of the patient's history were reviewed and updated as appropriate: allergies, current medications, past family history, past medical history, obstetric history, gynecologic history, past social history, past surgical history and problem list       Objective:  /78   Wt 72 1 kg (158 lb 14 4 oz)   LMP 2020 (Exact Date)   BMI 31 03 kg/m²   Pregravid Weight/BMI: 56 7 kg (125 lb) (BMI 24 41)  Current Weight: 72 1 kg (158 lb 14 4 oz)   Total Weight Gain: 15 4 kg (33 lb 14 4 oz) Pre- Vitals      Most Recent Value   Prenatal Assessment   Fetal Heart Rate  150   Fundal Height (cm)  30 cm   Movement  Present   Prenatal Vitals   Blood Pressure  118/78   Weight - Scale  72 1 kg (158 lb 14 4 oz)   Urine Albumin/Glucose   Dilation/Effacement/Station   Vaginal Drainage   Edema   LLE Edema  Trace   RLE Edema  Trace           General: Well appearing, no distress  Respiratory: Unlabored breathing  Cardiovascular: Regular rate  Abdomen: Soft, gravid, nontender  Fundal Height: Appropriate for gestational age  Extremities: Warm and well perfused  Non tender

## 2021-06-11 ENCOUNTER — TELEPHONE (OUTPATIENT)
Dept: OBGYN CLINIC | Facility: CLINIC | Age: 31
End: 2021-06-11

## 2021-06-11 RX ORDER — BLOOD-GLUCOSE METER
1 EACH MISCELLANEOUS 4 TIMES DAILY
Qty: 1 KIT | Refills: 0 | Status: SHIPPED | OUTPATIENT
Start: 2021-06-11 | End: 2021-08-20

## 2021-06-11 RX ORDER — BLOOD SUGAR DIAGNOSTIC
STRIP MISCELLANEOUS
Qty: 100 STRIP | Refills: 0 | Status: ON HOLD | OUTPATIENT
Start: 2021-06-11 | End: 2021-06-28

## 2021-06-11 RX ORDER — LANCETS
EACH MISCELLANEOUS 4 TIMES DAILY
Qty: 100 EACH | Refills: 0 | Status: SHIPPED | OUTPATIENT
Start: 2021-06-11 | End: 2021-08-20

## 2021-06-11 NOTE — TELEPHONE ENCOUNTER
Spoke with Desiree Brown regarding testing sugars  Glucose monitor, test strips, and lancets sent to pharmacy  Will need to do fasting blood sugar- first thing in morning before eating and then 2 hours after eating breakfast, 2 hours after eating lunch and 2 hours after eating evening meal  Will test for 7 days  To right down sugars daily and call results to office after 7 days

## 2021-06-19 ENCOUNTER — NURSE TRIAGE (OUTPATIENT)
Dept: OTHER | Facility: OTHER | Age: 31
End: 2021-06-19

## 2021-06-19 NOTE — TELEPHONE ENCOUNTER
Tiger connect message sent to on call provider regarding symptoms stated in initial assessment below  Patient informed that the on call provider will be contacting her  The on call provider was informed of patients phone number  Reason for Disposition   Patient sounds very sick or weak to the triager    Answer Assessment - Initial Assessment Questions  1  TEMPERATURE: "What is the most recent temperature?"  "How was it measured?"       99 1 oral   2  ONSET: "When did the fever start?"       "This morning when I woke up around 7:00am  I went back to sleep to see if it got better but I still feel pretty warm "   3  SYMPTOMS: "Do you have any other symptoms besides the fever?"   (e g , cough, cold symptoms, sore throat, rash, diarrhea, vomiting, abdominal pain, urine problems)      vomiting, diarrhea, one abdominal cramp "I had one this morning and I thought it was a cramp  I got up to go pee and then it went away "   4  CAUSE: If there are no symptoms, ask: "What do you think is causing the fever?"       Unknown  5  CONTACTS: "Does anyone else in the family have an infection?"      Denies   6  TREATMENT: "What have you done so far to treat this fever?" (e g , medications)      Denies   7  IMMUNOCOMPROMISE: "Do you have of the following: diabetes, HIV positive, splenectomy, chronic steroid treatment, transplant patient, etc "      Denies    8  OTHER SYMPTOMS: "Do you have any other symptoms?" (e g , abdominal pain, fever, vaginal bleeding, decreased fetal movement)      "I had light spotting  I wiped myself and there was a tiny tiny little dot three times and then it went away ", 10/10 right hand swelling, 6/10 left hand swelling, "It goes away throughout the day ", feet swelling,   9  SERA: "What date are you expecting to deliver? 8/15/2021   10  TRAVEL: "Have you traveled out of the country in the last month?" (e g , travel history, exposures)       Denies      "I'm 31 weeks pregnant   I'll be 33 tomorrow "    Protocols used: PREGNANCY - FEVER-ADULT-AH

## 2021-06-19 NOTE — TELEPHONE ENCOUNTER
Reason for Disposition   [1] Fever > 100 4 F (38 0 C) AND [2] NO cold symptoms (e g , runny nose, cough)    Additional Information   Negative: [1] Fever > 100 4 F (38 0 C) with cold symptoms AND [2] lasts > 3 days    Answer Assessment - Initial Assessment Questions  1  TEMPERATURE: "What is the most recent temperature?"  "How was it measured?"       100 5  2  ONSET: "When did the fever start?"       This morning  3  SYMPTOMS: "Do you have any other symptoms besides the fever?"   (e g , cough, cold symptoms, sore throat, rash, diarrhea, vomiting, abdominal pain, urine problems)      Diarrhea earlier today  4  CAUSE: If there are no symptoms, ask: "What do you think is causing the fever?"       unsure  5  CONTACTS: "Does anyone else in the family have an infection?"      none  6  TREATMENT: "What have you done so far to treat this fever?" (e g , medications)     Not yet  7  IMMUNOCOMPROMISE: "Do you have of the following: diabetes, HIV positive, splenectomy, chronic steroid treatment, transplant patient, etc "      none  8  OTHER SYMPTOMS: "Do you have any other symptoms?" (e g , abdominal pain, fever, vaginal   bleeding, decreased fetal movement)      Baby is moving well  9  SERA: "What date are you expecting to deliver? 8/15/21  10   TRAVEL: "Have you traveled out of the country in the last month?" (e g , travel history, exposures)        [facility] on Thursday    Protocols used: [facility]

## 2021-06-19 NOTE — TELEPHONE ENCOUNTER
Reason for Disposition   Fever > 104 F (40 C)    Answer Assessment - Initial Assessment Questions  1  TEMPERATURE: "What is the most recent temperature?"  "How was it measured?"       105, oral  2  ONSET: "When did the fever start?"       This morning  3  SYMPTOMS: "Do you have any other symptoms besides the fever?"   (e g , cough, cold symptoms, sore throat, rash, diarrhea, vomiting, abdominal pain, urine problems)      Denies  4  CAUSE: If there are no symptoms, ask: "What do you think is causing the fever?"       Unsure  5  CONTACTS: "Does anyone else in the family have an infection?"      Denies  6  TREATMENT: "What have you done so far to treat this fever?" (e g , medications)      Denies  7  IMMUNOCOMPROMISE: "Do you have of the following: diabetes, HIV positive, splenectomy, chronic steroid treatment, transplant patient, etc "      Denies  8  OTHER SYMPTOMS: "Do you have any other symptoms?" (e g , abdominal pain, fever, vaginal   bleeding, decreased fetal movement)      Denies  9  SERA: "What date are you expecting to deliver? 8/15  10   TRAVEL: "Have you traveled out of the country in the last month?" (e g , travel history, exposures)        [place] on Thursday    Protocols used: PREGNANCY - [facility]

## 2021-06-19 NOTE — TELEPHONE ENCOUNTER
Regardin weeks Pregnant, Fever  ----- Message from Adia Yates sent at 2021  1:12 PM EDT -----  " I am 32 weeks Pregnant, I just spoke to a Triage Nurse, I told the nurse my temperature was 105, but it's really 100 5   Should I still go to the ER  "

## 2021-06-20 PROBLEM — Z3A.32 32 WEEKS GESTATION OF PREGNANCY: Status: ACTIVE | Noted: 2021-03-03

## 2021-06-20 PROBLEM — O99.810 ABNORMAL GLUCOSE AFFECTING PREGNANCY: Status: ACTIVE | Noted: 2021-06-20

## 2021-06-23 ENCOUNTER — NURSE TRIAGE (OUTPATIENT)
Dept: OTHER | Facility: OTHER | Age: 31
End: 2021-06-23

## 2021-06-23 ENCOUNTER — HOSPITAL ENCOUNTER (INPATIENT)
Facility: HOSPITAL | Age: 31
LOS: 6 days | Discharge: HOME | End: 2021-06-30
Attending: OBSTETRICS & GYNECOLOGY | Admitting: OBSTETRICS & GYNECOLOGY
Payer: COMMERCIAL

## 2021-06-23 DIAGNOSIS — O14.13 PRE-ECLAMPSIA, SEVERE, THIRD TRIMESTER: Primary | ICD-10-CM

## 2021-06-23 DIAGNOSIS — Z3A.32 32 WEEKS GESTATION OF PREGNANCY: ICD-10-CM

## 2021-06-23 PROBLEM — O99.210 OBESITY IN PREGNANCY: Status: ACTIVE | Noted: 2021-06-23

## 2021-06-23 PROCEDURE — 87086 URINE CULTURE/COLONY COUNT: CPT | Performed by: OBSTETRICS & GYNECOLOGY

## 2021-06-23 PROCEDURE — 80053 COMPREHEN METABOLIC PANEL: CPT | Performed by: OBSTETRICS & GYNECOLOGY

## 2021-06-23 PROCEDURE — 87147 CULTURE TYPE IMMUNOLOGIC: CPT | Performed by: OBSTETRICS & GYNECOLOGY

## 2021-06-23 PROCEDURE — 84156 ASSAY OF PROTEIN URINE: CPT | Performed by: OBSTETRICS & GYNECOLOGY

## 2021-06-23 PROCEDURE — 81001 URINALYSIS AUTO W/SCOPE: CPT | Performed by: OBSTETRICS & GYNECOLOGY

## 2021-06-23 PROCEDURE — 82570 ASSAY OF URINE CREATININE: CPT | Performed by: OBSTETRICS & GYNECOLOGY

## 2021-06-23 PROCEDURE — 85025 COMPLETE CBC W/AUTO DIFF WBC: CPT | Performed by: OBSTETRICS & GYNECOLOGY

## 2021-06-23 RX ORDER — ONDANSETRON 2 MG/ML
4 INJECTION INTRAMUSCULAR; INTRAVENOUS ONCE
Status: COMPLETED | OUTPATIENT
Start: 2021-06-23 | End: 2021-06-24

## 2021-06-23 RX ORDER — PANTOPRAZOLE SODIUM 40 MG/1
40 INJECTION, POWDER, FOR SOLUTION INTRAVENOUS ONCE
Status: COMPLETED | OUTPATIENT
Start: 2021-06-23 | End: 2021-06-24

## 2021-06-23 RX ORDER — SODIUM CHLORIDE 9 MG/ML
50 INJECTION, SOLUTION INTRAVENOUS CONTINUOUS
Status: DISCONTINUED | OUTPATIENT
Start: 2021-06-23 | End: 2021-06-24

## 2021-06-24 PROBLEM — O26.893 EPIGASTRIC ABDOMINAL PAIN AFFECTING PREGNANCY IN THIRD TRIMESTER: Status: ACTIVE | Noted: 2021-06-24

## 2021-06-24 PROBLEM — O14.13 SEVERE PRE-ECLAMPSIA IN THIRD TRIMESTER: Status: ACTIVE | Noted: 2021-06-24

## 2021-06-24 PROBLEM — B00.9 HSV (HERPES SIMPLEX VIRUS) INFECTION: Status: ACTIVE | Noted: 2021-06-24

## 2021-06-24 PROBLEM — R10.13 EPIGASTRIC ABDOMINAL PAIN AFFECTING PREGNANCY IN THIRD TRIMESTER: Status: ACTIVE | Noted: 2021-06-24

## 2021-06-24 LAB
ABO GROUP BLD: NORMAL
ALBUMIN SERPL BCP-MCNC: 2.3 G/DL (ref 3.5–5)
ALBUMIN SERPL BCP-MCNC: 2.4 G/DL (ref 3.5–5)
ALP SERPL-CCNC: 210 U/L (ref 46–116)
ALP SERPL-CCNC: 217 U/L (ref 46–116)
ALT SERPL W P-5'-P-CCNC: 54 U/L (ref 12–78)
ALT SERPL W P-5'-P-CCNC: 59 U/L (ref 12–78)
ANION GAP SERPL CALCULATED.3IONS-SCNC: 11 MMOL/L (ref 4–13)
ANION GAP SERPL CALCULATED.3IONS-SCNC: 7 MMOL/L (ref 4–13)
AST SERPL W P-5'-P-CCNC: 36 U/L (ref 5–45)
AST SERPL W P-5'-P-CCNC: 37 U/L (ref 5–45)
BACTERIA UR QL AUTO: ABNORMAL /HPF
BASOPHILS # BLD AUTO: 0.04 THOUSANDS/ΜL (ref 0–0.1)
BASOPHILS NFR BLD AUTO: 0 % (ref 0–1)
BILIRUB SERPL-MCNC: 0.22 MG/DL (ref 0.2–1)
BILIRUB SERPL-MCNC: 0.24 MG/DL (ref 0.2–1)
BILIRUB UR QL STRIP: NEGATIVE
BLD GP AB SCN SERPL QL: NEGATIVE
BUN SERPL-MCNC: 11 MG/DL (ref 5–25)
BUN SERPL-MCNC: 9 MG/DL (ref 5–25)
CALCIUM ALBUM COR SERPL-MCNC: 10.3 MG/DL (ref 8.3–10.1)
CALCIUM ALBUM COR SERPL-MCNC: 9.6 MG/DL (ref 8.3–10.1)
CALCIUM SERPL-MCNC: 8.3 MG/DL (ref 8.3–10.1)
CALCIUM SERPL-MCNC: 8.9 MG/DL (ref 8.3–10.1)
CHLORIDE SERPL-SCNC: 103 MMOL/L (ref 100–108)
CHLORIDE SERPL-SCNC: 104 MMOL/L (ref 100–108)
CLARITY UR: ABNORMAL
CO2 SERPL-SCNC: 21 MMOL/L (ref 21–32)
CO2 SERPL-SCNC: 25 MMOL/L (ref 21–32)
COLOR UR: YELLOW
CREAT SERPL-MCNC: 0.58 MG/DL (ref 0.6–1.3)
CREAT SERPL-MCNC: 0.58 MG/DL (ref 0.6–1.3)
CREAT UR-MCNC: 30.3 MG/DL
EOSINOPHIL # BLD AUTO: 0.21 THOUSAND/ΜL (ref 0–0.61)
EOSINOPHIL NFR BLD AUTO: 2 % (ref 0–6)
ERYTHROCYTE [DISTWIDTH] IN BLOOD BY AUTOMATED COUNT: 13.1 % (ref 11.6–15.1)
ERYTHROCYTE [DISTWIDTH] IN BLOOD BY AUTOMATED COUNT: 13.2 % (ref 11.6–15.1)
GFR SERPL CREATININE-BSD FRML MDRD: 125 ML/MIN/1.73SQ M
GFR SERPL CREATININE-BSD FRML MDRD: 125 ML/MIN/1.73SQ M
GLUCOSE SERPL-MCNC: 109 MG/DL (ref 65–140)
GLUCOSE SERPL-MCNC: 113 MG/DL (ref 65–140)
GLUCOSE SERPL-MCNC: 115 MG/DL (ref 65–140)
GLUCOSE SERPL-MCNC: 118 MG/DL (ref 65–140)
GLUCOSE SERPL-MCNC: 119 MG/DL (ref 65–140)
GLUCOSE SERPL-MCNC: 119 MG/DL (ref 65–140)
GLUCOSE SERPL-MCNC: 81 MG/DL (ref 65–140)
GLUCOSE SERPL-MCNC: 98 MG/DL (ref 65–140)
GLUCOSE UR STRIP-MCNC: NEGATIVE MG/DL
HCT VFR BLD AUTO: 31.7 % (ref 34.8–46.1)
HCT VFR BLD AUTO: 32.7 % (ref 34.8–46.1)
HGB BLD-MCNC: 10.6 G/DL (ref 11.5–15.4)
HGB BLD-MCNC: 10.6 G/DL (ref 11.5–15.4)
HGB UR QL STRIP.AUTO: ABNORMAL
HYALINE CASTS #/AREA URNS LPF: ABNORMAL /LPF
IMM GRANULOCYTES # BLD AUTO: 0.05 THOUSAND/UL (ref 0–0.2)
IMM GRANULOCYTES NFR BLD AUTO: 1 % (ref 0–2)
KETONES UR STRIP-MCNC: NEGATIVE MG/DL
LEUKOCYTE ESTERASE UR QL STRIP: ABNORMAL
LYMPHOCYTES # BLD AUTO: 3.04 THOUSANDS/ΜL (ref 0.6–4.47)
LYMPHOCYTES NFR BLD AUTO: 28 % (ref 14–44)
MCH RBC QN AUTO: 27 PG (ref 26.8–34.3)
MCH RBC QN AUTO: 27.7 PG (ref 26.8–34.3)
MCHC RBC AUTO-ENTMCNC: 32.4 G/DL (ref 31.4–37.4)
MCHC RBC AUTO-ENTMCNC: 33.4 G/DL (ref 31.4–37.4)
MCV RBC AUTO: 83 FL (ref 82–98)
MCV RBC AUTO: 83 FL (ref 82–98)
MONOCYTES # BLD AUTO: 0.73 THOUSAND/ΜL (ref 0.17–1.22)
MONOCYTES NFR BLD AUTO: 7 % (ref 4–12)
NEUTROPHILS # BLD AUTO: 6.81 THOUSANDS/ΜL (ref 1.85–7.62)
NEUTS SEG NFR BLD AUTO: 62 % (ref 43–75)
NITRITE UR QL STRIP: NEGATIVE
NON-SQ EPI CELLS URNS QL MICRO: ABNORMAL /HPF
NRBC BLD AUTO-RTO: 0 /100 WBCS
PH UR STRIP.AUTO: 6.5 [PH]
PLATELET # BLD AUTO: 238 THOUSANDS/UL (ref 149–390)
PLATELET # BLD AUTO: 251 THOUSANDS/UL (ref 149–390)
PMV BLD AUTO: 12.3 FL (ref 8.9–12.7)
PMV BLD AUTO: 12.5 FL (ref 8.9–12.7)
POTASSIUM SERPL-SCNC: 3.5 MMOL/L (ref 3.5–5.3)
POTASSIUM SERPL-SCNC: 3.7 MMOL/L (ref 3.5–5.3)
PROT SERPL-MCNC: 7.1 G/DL (ref 6.4–8.2)
PROT SERPL-MCNC: 7.2 G/DL (ref 6.4–8.2)
PROT UR STRIP-MCNC: ABNORMAL MG/DL
PROT UR-MCNC: 128 MG/DL
PROT/CREAT UR: 4.22 MG/G{CREAT} (ref 0–0.1)
RBC # BLD AUTO: 3.82 MILLION/UL (ref 3.81–5.12)
RBC # BLD AUTO: 3.93 MILLION/UL (ref 3.81–5.12)
RBC #/AREA URNS AUTO: ABNORMAL /HPF
RH BLD: POSITIVE
RPR SER QL: NORMAL
SODIUM SERPL-SCNC: 135 MMOL/L (ref 136–145)
SODIUM SERPL-SCNC: 136 MMOL/L (ref 136–145)
SP GR UR STRIP.AUTO: 1.01 (ref 1–1.03)
SPECIMEN EXPIRATION DATE: NORMAL
UROBILINOGEN UR QL STRIP.AUTO: 0.2 E.U./DL
WBC # BLD AUTO: 10.88 THOUSAND/UL (ref 4.31–10.16)
WBC # BLD AUTO: 11.77 THOUSAND/UL (ref 4.31–10.16)
WBC #/AREA URNS AUTO: ABNORMAL /HPF

## 2021-06-24 PROCEDURE — 80053 COMPREHEN METABOLIC PANEL: CPT | Performed by: OBSTETRICS & GYNECOLOGY

## 2021-06-24 PROCEDURE — 76820 UMBILICAL ARTERY ECHO: CPT | Performed by: OBSTETRICS & GYNECOLOGY

## 2021-06-24 PROCEDURE — 86592 SYPHILIS TEST NON-TREP QUAL: CPT | Performed by: STUDENT IN AN ORGANIZED HEALTH CARE EDUCATION/TRAINING PROGRAM

## 2021-06-24 PROCEDURE — 82948 REAGENT STRIP/BLOOD GLUCOSE: CPT

## 2021-06-24 PROCEDURE — 86900 BLOOD TYPING SEROLOGIC ABO: CPT | Performed by: OBSTETRICS & GYNECOLOGY

## 2021-06-24 PROCEDURE — 86850 RBC ANTIBODY SCREEN: CPT | Performed by: OBSTETRICS & GYNECOLOGY

## 2021-06-24 PROCEDURE — 99221 1ST HOSP IP/OBS SF/LOW 40: CPT | Performed by: OBSTETRICS & GYNECOLOGY

## 2021-06-24 PROCEDURE — NC001 PR NO CHARGE: Performed by: OBSTETRICS & GYNECOLOGY

## 2021-06-24 PROCEDURE — 85027 COMPLETE CBC AUTOMATED: CPT | Performed by: OBSTETRICS & GYNECOLOGY

## 2021-06-24 PROCEDURE — 76816 OB US FOLLOW-UP PER FETUS: CPT | Performed by: OBSTETRICS & GYNECOLOGY

## 2021-06-24 PROCEDURE — 99214 OFFICE O/P EST MOD 30 MIN: CPT

## 2021-06-24 PROCEDURE — 59025 FETAL NON-STRESS TEST: CPT | Performed by: OBSTETRICS & GYNECOLOGY

## 2021-06-24 PROCEDURE — 86901 BLOOD TYPING SEROLOGIC RH(D): CPT | Performed by: OBSTETRICS & GYNECOLOGY

## 2021-06-24 PROCEDURE — C9113 INJ PANTOPRAZOLE SODIUM, VIA: HCPCS | Performed by: OBSTETRICS & GYNECOLOGY

## 2021-06-24 RX ORDER — LABETALOL 20 MG/4 ML (5 MG/ML) INTRAVENOUS SYRINGE
80 ONCE
Status: DISCONTINUED | OUTPATIENT
Start: 2021-06-24 | End: 2021-06-25

## 2021-06-24 RX ORDER — LABETALOL 20 MG/4 ML (5 MG/ML) INTRAVENOUS SYRINGE
20 ONCE
Status: COMPLETED | OUTPATIENT
Start: 2021-06-24 | End: 2021-06-24

## 2021-06-24 RX ORDER — MAGNESIUM SULFATE HEPTAHYDRATE 40 MG/ML
4 INJECTION, SOLUTION INTRAVENOUS ONCE
Status: COMPLETED | OUTPATIENT
Start: 2021-06-24 | End: 2021-06-24

## 2021-06-24 RX ORDER — LABETALOL 20 MG/4 ML (5 MG/ML) INTRAVENOUS SYRINGE
Status: DISPENSED
Start: 2021-06-24 | End: 2021-06-24

## 2021-06-24 RX ORDER — BETAMETHASONE SODIUM PHOSPHATE AND BETAMETHASONE ACETATE 3; 3 MG/ML; MG/ML
12 INJECTION, SUSPENSION INTRA-ARTICULAR; INTRALESIONAL; INTRAMUSCULAR; SOFT TISSUE EVERY 24 HOURS
Status: COMPLETED | OUTPATIENT
Start: 2021-06-24 | End: 2021-06-25

## 2021-06-24 RX ORDER — LABETALOL 20 MG/4 ML (5 MG/ML) INTRAVENOUS SYRINGE
40 ONCE
Status: COMPLETED | OUTPATIENT
Start: 2021-06-24 | End: 2021-06-24

## 2021-06-24 RX ORDER — VALACYCLOVIR HYDROCHLORIDE 500 MG/1
500 TABLET, FILM COATED ORAL DAILY
Status: DISCONTINUED | OUTPATIENT
Start: 2021-06-24 | End: 2021-06-30 | Stop reason: HOSPADM

## 2021-06-24 RX ORDER — MAGNESIUM SULFATE HEPTAHYDRATE 40 MG/ML
2 INJECTION, SOLUTION INTRAVENOUS CONTINUOUS
Status: DISCONTINUED | OUTPATIENT
Start: 2021-06-24 | End: 2021-06-24

## 2021-06-24 RX ORDER — MAGNESIUM SULFATE HEPTAHYDRATE 40 MG/ML
2 INJECTION, SOLUTION INTRAVENOUS ONCE
Status: COMPLETED | OUTPATIENT
Start: 2021-06-24 | End: 2021-06-24

## 2021-06-24 RX ADMIN — MAGNESIUM SULFATE HEPTAHYDRATE 4 G: 40 INJECTION, SOLUTION INTRAVENOUS at 00:49

## 2021-06-24 RX ADMIN — LABETALOL 20 MG/4 ML (5 MG/ML) INTRAVENOUS SYRINGE 40 MG: at 00:41

## 2021-06-24 RX ADMIN — PANTOPRAZOLE SODIUM 40 MG: 40 INJECTION, POWDER, FOR SOLUTION INTRAVENOUS at 01:25

## 2021-06-24 RX ADMIN — VALACYCLOVIR HYDROCHLORIDE 500 MG: 500 TABLET, FILM COATED ORAL at 09:02

## 2021-06-24 RX ADMIN — LABETALOL 20 MG/4 ML (5 MG/ML) INTRAVENOUS SYRINGE 20 MG: at 00:10

## 2021-06-24 RX ADMIN — BETAMETHASONE SODIUM PHOSPHATE AND BETAMETHASONE ACETATE 12 MG: 3; 3 INJECTION, SUSPENSION INTRA-ARTICULAR; INTRALESIONAL; INTRAMUSCULAR at 01:15

## 2021-06-24 RX ADMIN — MAGNESIUM SULFATE HEPTAHYDRATE 2 G/HR: 40 INJECTION, SOLUTION INTRAVENOUS at 01:25

## 2021-06-24 RX ADMIN — MAGNESIUM SULFATE IN WATER 2 G: 40 INJECTION, SOLUTION INTRAVENOUS at 01:14

## 2021-06-24 RX ADMIN — MAGNESIUM SULFATE HEPTAHYDRATE 2 G/HR: 40 INJECTION, SOLUTION INTRAVENOUS at 11:33

## 2021-06-24 RX ADMIN — SODIUM CHLORIDE 50 ML/HR: 0.9 INJECTION, SOLUTION INTRAVENOUS at 00:30

## 2021-06-24 RX ADMIN — ONDANSETRON 4 MG: 2 INJECTION INTRAMUSCULAR; INTRAVENOUS at 00:18

## 2021-06-24 NOTE — CONSULTS
Inpatient consult to Neonatology  Consult performed by: Talia Arreaga ordered by: Dr Martin Spence     Reason for Consult:  Preeclampsia with severe features at 32+ weeks EGA  Inpatient prenatal consultation conducted today       HPI: Shwetha Alston is a 35y o  year old  female at 28 + 4 weeks EGA with an SERA of 8/15/21  She was admitted to L&D yesterday for Preeclampsia with severe features at 32+ weeks EGA, though BP's are now under control with medications  Prenatal labs include TypeA+, Antibody negative, RPR (NR), HepB (neg), Rubella Immune, HIV (neg), GBS (unknown)  Mother with a h/o HSV, and is on Valtrex, without any h/o recent lesions  Mother is not in active labor, and was started on a 2 dose course of Betamethasone, as well as Magnesium Sulfate and Labetalol to control BPs  She is being managed expectantly until planned induction at 29 weeks EGA, unless MFM advises otherwise, or clinical condition changes       Discussed that neonatologist is available in-house at [facility]  and would participate in the management of her infant  Since infant is expected to deliver prematurely, a neonatologist would be at the delivery, and if infant were to deliver before 27 weeks EGA, the infant would need to be admitted to NICU  Even at 35 weeks, the infant would be at risk for problems associated with late  delivery as indicated below  Discussed delivery room and  management of  infants, including assessment and typical management of cardiorespiratory, infectious, nutritional, and thermal needs  Reassured mother that severe complications are unusual at current or anticipated EGA at delivery   Mentioned that length of stay varies, but that infants are often ready for discharge by about one month prior to the due date, and that family will have ample warning as discharge readiness approaches       Parents questions answered       Counseling / Coordination of Care  I spent 20 minutes in discussion with parents and 10 minutes charting  Greater than 50% of total time was spent with the patient and / or family counseling and / or coordination of care     Annemarie Roldan MD  Neonatology

## 2021-06-24 NOTE — QUICK NOTE
Patient has had severe range pressures since approximately 3:00 a m   This morning  Reports no signs or symptoms of preeclampsia  Exam benign with 2+ DTRs and no clonus elicited    Discontinue Mag at this time per recommendations from Dr Maurilio Bains ox  Twice daily monitoring  Daily labs    Dana Jacobs MD  OBGYN PGY-3  6/24/2021 3:10 PM

## 2021-06-24 NOTE — CONSULTS
Consultation - [facility]  Shwetha Alston 35 y o  female MRN: [id number]  Unit/Bed#: L&D 326-01 Encounter: [id number]      Consults    No chief complaint on file  History of Present Illness   Physician Requesting Consult: Barbara Ma MD  Reason for Consult / Principal Problem:  Preeclampsia with severe features  Subspeciality: Perinatology    HPI:   Shwetha Alston is a 35 y o   female with an SERA of 8/15/2021, by Last Menstrual Period at 32w4d gestation who is being evaluated for Pre-eclampsia with severe features        Patient initially presented to Labor and delivery with epigastric pain, patient denies visual changes, headache,  contractions, vaginal bleeding, decreased fetal movement or leakage of fluid       Patient with initial blood pressure in Labor and delivery of 188/104, followed by 174/102 and 166/102, received management with 20 mg of IV labetalol   Subsequently evidence of blood pressure of 170/102 patient receive 40 mg of labetalol with persistent of severe range blood pressures she receive 80 more mg of labetalol  And magnesium infusion was started  further blood pressures were no severe fluctuating between 130s to 150s over 80s to 90s     Labs with evidence of hemoglobin of 10 6 with hematocrit of 31 7, platelets 716 CMP with AST ALT 37/54, creatinine 0 58   Protein creatinine ratio of 4 22      Pregnancy complicated by history of GBS bacteriuria, ASCUS, HSV and rubella NI   an elevated 1 hour GTT (3 hour GTT has not been performed)    Her current obstetrical history is significant for history of 1 previous spontaneous   Contractions: None  Leakage of fluid: None  Bleeding: None  Fetal movement: present       Review of Systems  All systems reviewed are negative    PREGNANCY COMPLICATIONS:   1) pre-eclampsia, with severe features  2) elevated 1 hour GTT  3) GBS bacteriuria  4) ascus  5) rubella nonimmune  6) history of herpes simplex virus    OB History    Para Term  AB Living   2       1 0   SAB TAB Ectopic Multiple Live Births   1              # Outcome Date GA Lbr Grupo/2nd Weight Sex Delivery Anes PTL Lv   2 Current            1 SAB 2013     SAB         Birth Comments: early first trimester SAB       Baby complications/comments: no recent fetal growth on charts    Historical Information   Past Medical History:   Diagnosis Date    Abnormal Pap smear of cervix     Environmental allergies     Seasonal allergies     Severe pre-eclampsia in third trimester 6/24/2021    Varicella      Past Surgical History:   Procedure Laterality Date    NO PAST SURGERIES       Social History   Social History     Substance and Sexual Activity   Alcohol Use Not Currently    Comment: social/prior to pregnancy     Social History     Substance and Sexual Activity   Drug Use Never     Social History     Tobacco Use   Smoking Status Never Smoker   Smokeless Tobacco Never Used     Family History:  Patient store family history of preeclampsia (her mom)    Meds/Allergies      Medications Prior to Admission   Medication    Prenatal MV-Min-Fe Fum-FA-DHA (PRENATAL 1 PO)    Blood Glucose Monitoring Suppl (Contour Next EZ) w/Device KIT    glucose blood (Contour Next Test) test strip    Microlet Lancets MISC        Allergies   Allergen Reactions    Penicillins Rash       OBJECTIVE:    Vitals: Blood pressure 149/89, pulse 65, temperature 98 6 °F (37 °C), temperature source Oral, last menstrual period 11/08/2020, not currently breastfeeding  There is no height or weight on file to calculate BMI      Physical Exam  GEN: NAD, AAO x 3  RESP: CTABL, No wheeze, rhonci, or stridor  CVS: RRR S1S2 nl, No murmurs, rubs, or gallops  ABD: Soft, NT, gravid, BS present  EXT: No c/c/e    Fetal heart rate: Baseline: 130 bpm, moderate variability and positive accelerations no decelerations  [place]: regular, every 6-10 minutes    Prenatal Labs:   Rubella nonimmune  1 hour , 3 hour GTT has not been performed  HIV negative  Hepatitis-B nonreactive  RPR negative  Chlamydia gonorrhea negative       Assessment/Plan     Preeclampsia with severe features  · Severe features:  Severe range blood pressures and epigastric pain  · Status post labetalol of 20, 40 and 80 mg currently on magnesium infusion  · Status post 1st dose of betamethasone at 1:20 a m  Pending 2nd dose  · CBC and CMP are unremarkable  · Last severe range blood pressure at 3:30 a m  · Current blood pressure fluctuating between 130s to 150s over 80s to 90s  · Continue labs q 6, depending results consider q 12  · Pending labs at 6:00 a m    · Continue close follow-up of blood pressures    Elevated 1 hour GTT  · Consider performance of 3 hour GTT inpatient    Pregnancy at 32 weeks and 4 days  · Currently without obstetric complaints  · Fetal tracing is reactive and reassuring  · Consider repeat growth scan    Farrukh Porras MD  6/24/2021  4:51 AM

## 2021-06-24 NOTE — PLAN OF CARE
Problem: Knowledge Deficit  Goal: Verbalizes understanding of labor plan  Description: Assess patient/family/caregiver's baseline knowledge level and ability to understand information  Provide education via patient/family/caregiver's preferred learning method at appropriate level of understanding  1  Provide teaching at level of understanding  2  Provide teaching via preferred learning method(s)  Outcome: Progressing  Goal: Patient/family/caregiver demonstrates understanding of disease process, treatment plan, medications, and discharge instructions  Description: Complete learning assessment and assess knowledge base  Interventions:  - Provide teaching at level of understanding  - Provide teaching via preferred learning methods  Outcome: Progressing     Problem: Labor & Delivery  Goal: Manages discomfort  Description: Assess and monitor for signs and symptoms of discomfort  Assess patient's pain level regularly and per hospital policy  Administer medications as ordered  Support use of nonpharmacological methods to help control pain such as distraction, imagery, relaxation, and application of heat and cold  Collaborate with interdisciplinary team and patient to determine appropriate pain management plan  1  Include patient in decisions related to comfort  2  Offer non-pharmacological pain management interventions  3  Report ineffective pain management to physician  Outcome: Progressing  Goal: Patient vital signs are stable  Description: 1  Assess vital signs - vaginal delivery    Outcome: Progressing     Problem: PAIN - ADULT  Goal: Verbalizes/displays adequate comfort level or baseline comfort level  Description: Interventions:  - Encourage patient to monitor pain and request assistance  - Assess pain using appropriate pain scale  - Administer analgesics based on type and severity of pain and evaluate response  - Implement non-pharmacological measures as appropriate and evaluate response  - Consider cultural and social influences on pain and pain management  - Notify physician/advanced practitioner if interventions unsuccessful or patient reports new pain  Outcome: Progressing     Problem: INFECTION - ADULT  Goal: Absence or prevention of progression during hospitalization  Description: INTERVENTIONS:  - Assess and monitor for signs and symptoms of infection  - Monitor lab/diagnostic results  - Monitor all insertion sites, i e  indwelling lines, tubes, and drains  - Monitor endotracheal if appropriate and nasal secretions for changes in amount and color  - [place] appropriate cooling/warming therapies per order  - Administer medications as ordered  - Instruct and encourage patient and family to use good hand hygiene technique  - Identify and instruct in appropriate isolation precautions for identified infection/condition  Outcome: Progressing  Goal: Absence of fever/infection during neutropenic period  Description: INTERVENTIONS:  - Monitor WBC    Outcome: Progressing     Problem: SAFETY ADULT  Goal: Patient will remain free of falls  Description: INTERVENTIONS:  - Educate patient/family on patient safety including physical limitations  - Instruct patient to call for assistance with activity   - Consult OT/PT to assist with strengthening/mobility   - Keep Call bell within reach  - Keep bed low and locked with side rails adjusted as appropriate  - Keep care items and personal belongings within reach    - Apply yellow socks and bracelet for high fall risk patients  - Consider moving patient to room near nurses station  Outcome: Progressing  Goal: Maintain or return to baseline ADL function  Description: INTERVENTIONS:  -  Assess patient's ability to carry out ADLs; assess patient's baseline for ADL function and identify physical deficits which impact ability to perform ADLs (bathing, care of mouth/teeth, toileting, grooming, dressing, etc )  - Assess/evaluate cause of self-care deficits   - Assess range of motion  - Assess patient's mobility; develop plan if impaired  - Assess patient's need for assistive devices and provide as appropriate  - Encourage maximum independence but intervene and supervise when necessary  - Involve family in performance of ADLs  - Assess for home care needs following discharge   - Consider OT consult to assist with ADL evaluation and planning for discharge  - Provide patient education as appropriate  Outcome: Progressing  Goal: Maintains/Returns to pre admission functional level  Description: INTERVENTIONS:  - Perform BMAT or MOVE assessment daily    - Set and communicate daily mobility goal to care team and patient/family/caregiver     - Collaborate with rehabilitation services on mobility goals if consulted    - Record patient progress and toleration of activity level   Outcome: Progressing     Problem: DISCHARGE PLANNING  Goal: Discharge to home or other facility with appropriate resources  Description: INTERVENTIONS:  - Identify barriers to discharge w/patient and caregiver  - Arrange for needed discharge resources and transportation as appropriate  - Identify discharge learning needs (meds, wound care, etc )  - Arrange for interpretive services to assist at discharge as needed  - Refer to Case Management Department for coordinating discharge planning if the patient needs post-hospital services based on physician/advanced practitioner order or complex needs related to functional status, cognitive ability, or social support system  Outcome: Progressing

## 2021-06-24 NOTE — QUICK NOTE
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  Discussed with patient will start Valtrex for suppression and if we move toward vaginal delivery will perform full pelvic exam to evaluate for lesions  Discussed that HSV is not a contraindication to breastfeeding postpartum  Discussed that patient had Category II FHT earlier this morning with indeterminate deceleration at 0800  Moderate variability with accelerations otherwise  Will continue on Magnesium and continuous FHT until this PM and re-evaluate status at that time  Last SRBP at 0300, since that time have been mostly 130s/90s  Vitals:    06/24/21 0503 06/24/21 0543 06/24/21 0613 06/24/21 0643   BP: 136/82 133/80 126/79 143/100   Pulse: 69 74 70 84   Resp:       Temp:       TempSrc:       SpO2:       Weight:       Height:         0746: 136/91  0847: 138/95    Will continue hourly BP checks until 12 hours after last SRBP  Per discussion with MFM this morning, will repeat labs in AM or if clinically indicated  Will start blood glucose checks in AM and 2h postprandial given elevated 1h GTT without 3h GTT  Continue to monitor      Discussed with Dr Selin Cruz MD  Ob/Gyn R-3  06/24/21 9:12 AM

## 2021-06-24 NOTE — TELEPHONE ENCOUNTER
Pt with symptoms as noted in initial assessment  Contacted on call provider who advised pt be seen in the ED first and then L&D  Pt made aware of same and was agreeable

## 2021-06-24 NOTE — QUICK NOTE
Patient received a 3rd dose of labetalol, this time a dose of 80 mg once around 1:00 a m   Subsequent blood pressures fluctuating between 130s to 150s over 80s to 90s  Epigastric pain have improved  Currently on magnesium infusion  Around 3:31 a m  Patient with another severe range blood pressure of 166/94, followed by 149/89  Not requiring new treatment for severe range blood pressures  Will continue close follow-up of blood pressures      Patient discussed with Dr Corazon Chaudhary MD

## 2021-06-24 NOTE — TELEPHONE ENCOUNTER
Reason for Disposition   MODERATE-SEVERE abdominal pain (e g , interferes with normal activities, awakens from sleep)    Answer Assessment - Initial Assessment Questions  1  LOCATION: "Where does it hurt?"       Center of upper abdomen  2  RADIATION: "Does the pain shoot anywhere else?" (e g , chest, back)      denies  3  ONSET: "When did the pain begin?" (Minutes, hours or days ago)       One hour ago  4  ONSET: "Gradual or sudden onset?"      sudden  5  PATTERN: "Does the pain come and go, or has it been constant since it started?"       constant  6  SEVERITY: "How bad is the pain?" "What does it keep you from doing?"  (e g , Scale 1-10; mild, moderate, or severe)    - MILD (1-3): doesn't interfere with normal activities, abdomen soft and not tender to touch     - MODERATE (4-7): interferes with normal activities or awakens from sleep, tender to touch     - SEVERE (8-10): excruciating pain, doubled over, unable to do any normal activities      Moderate-severe  7  RECURRENT SYMPTOM: "Have you ever had this type of stomach pain before?" If Yes, ask: "When was the last time?" and "What happened that time?"       denies  8  CAUSE: "What do you think is causing the stomach pain?      unknown  9  RELIEVING/AGGRAVATING FACTORS: "What makes it better or worse?" (e g , antacids, bowel movement, movement)      nothing  10  FETAL MOVEMENT: "Has the baby's movement decreased or changed significantly from normal?"        normal  11  OTHER SYMPTOMS: "Has there been any vaginal bleeding, fever, vomiting, diarrhea, or urine problems?"        denies  12   SERA: "What date are you expecting to deliver?"        8/15/21    Protocols used: PREGNANCY - ABDOMINAL PAIN GREATER THAN 20 WEEKS EGA-ADULT-

## 2021-06-24 NOTE — UTILIZATION REVIEW
Initial Clinical Review    Admission: Date/Time/Statement:   Admission Orders (From admission, onward)     Ordered        06/24/21 0029  Inpatient Admission  Once                   Orders Placed This Encounter   Procedures    Inpatient Admission     Standing Status:   Standing     Number of Occurrences:   1     Order Specific Question:   Level of Care     Answer:   Med Surg [16]     Order Specific Question:   Estimated length of stay     Answer:   More than 2 Midnights     Order Specific Question:   Certification     Answer:   I certify that inpatient services are medically necessary for this patient for a duration of greater than two midnights  See H&P and MD Progress Notes for additional information about the patient's course of treatment  Initial Presentation: 34 yo  G 2 P 0 presented to L&D as inpatient admission for severe preeclampsia @ 32 4/7 wks  As per Pstient c/o abdominal pain in epigastric area, nausea (+) edema  BP on admission 188/104, followed by 174/102 and 166/102, patient receive management with 20 mg of IV labetalol  Subsequently evidence of blood pressure of 170/102 patient receive 40 mg of labetalol  IV MGSO4 started BTM 12 mg x doses q 12 hrs   Plan monitor lab BP's continuous external monitoring IV antihypertensive medications as needed and supportive care  Consult perinatology and neonatology  Pregnancy complicated by history of GBS bacteriuria, ASCUS, HSV and rubella NI   an elevated 1 hour GTT (3 hour GTT has not been performed)    06-24-21 HD # 1  32 3/7 wks remains IV MGSO4 second dose BTM Category II FHT earlier this morning with indeterminate deceleration at 0800   Moderate variability with accelerations otherwise    Admitting Vitals   Temperature Pulse Respirations Blood Pressure SpO2   06/23/21 2336 06/23/21 2336 06/24/21 0100 06/23/21 2336 06/24/21 0050   98 6 °F (37 °C) 58 18 (!) 174/102 98 %      Temp Source Heart Rate Source Patient Position - Orthostatic VS BP Location FiO2 (%)   06/23/21 2336 06/23/21 2336 -- 06/23/21 2336 --   Oral Monitor  Left arm       Pain Score       06/23/21 2336       9          Wt Readings from Last 1 Encounters:   06/24/21 71 7 kg (158 lb)     Additional Vital Signs:   06/24/21 0543  --  74  --  133/80  --  --   06/24/21 0503  --  69  --  136/82  --  --   06/24/21 0500  --  --  16  --  99 %  None (Room air)   06/24/21 0449  --  71  --  149/93  --  --   06/24/21 0433  --  79  --  137/81  --  --   06/24/21 0418  --  68  --  134/86  --  --   06/24/21 0403  --  75  --  137/91  --  --   06/24/21 0348  --  65  --  149/89  --  --   06/24/21 0332  --  65  --  166/94   --  --   BP: Dr Alberto Hale notified at 06/24/21 0332   06/24/21 0308  --  75  --  144/95  --  --   06/24/21 0300  --  --  18  --  98 %  None (Room air)   06/24/21 0253  --  68  --  143/85  --  --   06/24/21 0229  --  70  --  134/89  --  --   06/24/21 0219  --  65  --  147/95  --  --   06/24/21 0209  --  65  --  152/105Abnormal   --  --   06/24/21 0159  --  61  --  141/93  --  --   06/24/21 0150  --  62  --  151/87  --  --   06/24/21 0139  --  61  --  136/88  --  --   06/24/21 0132  --  66  --  141/89  --  --   06/24/21 0119  --  73  --  136/94  --  --   06/24/21 0111  --  69  --  150/98  --  --   06/24/21 0100  --  62  18  161/94  --  --   06/24/21 0050  --  --  --  --  98 %  None (Room air)   06/24/21 0038  --  61  --  170/103Abnormal    --  --   BP: Dr Alberto Hale made aware at 06/24/21 0038   06/24/21 0026  --  57  --  170/102Abnormal   --  --   06/23/21 2350  --  --  --  166/102Abnormal   --         Pertinent Labs/Diagnostic Test Results:       Results from last 7 days   Lab Units 06/24/21  0606 06/23/21  2357   WBC Thousand/uL 11 77* 10 88*   HEMOGLOBIN g/dL 10 6* 10 6*   HEMATOCRIT % 32 7* 31 7*   PLATELETS Thousands/uL 238 251   NEUTROS ABS Thousands/µL  --  6 81         Results from last 7 days   Lab Units 06/24/21  0606 06/23/21  2357   SODIUM mmol/L 136 135*   POTASSIUM mmol/L 3 7 3 5   CHLORIDE mmol/L 104 103   CO2 mmol/L 25 21   ANION GAP mmol/L 7 11   BUN mg/dL 9 11   CREATININE mg/dL 0 58* 0 58*   EGFR ml/min/1 73sq m 125 125   CALCIUM mg/dL 8 3 8 9     Results from last 7 days   Lab Units 06/24/21  0606 06/23/21  2357   AST U/L 36 37   ALT U/L 59 54   ALK PHOS U/L 217* 210*   TOTAL PROTEIN g/dL 7 2 7 1   ALBUMIN g/dL 2 4* 2 3*   TOTAL BILIRUBIN mg/dL 0 24 0 22     Results from last 7 days   Lab Units 06/24/21  1036 06/24/21  0823   POC GLUCOSE mg/dl 119 113     Results from last 7 days   Lab Units 06/24/21  0606 06/23/21  2357   GLUCOSE RANDOM mg/dL 109 81     Results from last 7 days   Lab Units 06/23/21  2357   CLARITY UA  Slightly Cloudy   COLOR UA  Yellow   SPEC GRAV UA  1 010   PH UA  6 5   GLUCOSE UA mg/dl Negative   KETONES UA mg/dl Negative   BLOOD UA  Small*   PROTEIN UA mg/dl 100 (2+)*   NITRITE UA  Negative   BILIRUBIN UA  Negative   UROBILINOGEN UA E U /dl 0 2   LEUKOCYTES UA  Large*   WBC UA /hpf Innumerable*   RBC UA /hpf 2-4   BACTERIA UA /hpf Occasional   EPITHELIAL CELLS WET PREP /hpf Occasional   CREATININE UR mg/dL 30 3   PROTEIN UR mg/dL 128   PROT/CREAT RATIO UR  4 22*       Past Medical History:   Diagnosis Date    Abnormal Pap smear of cervix     Environmental allergies     Seasonal allergies     Severe pre-eclampsia in third trimester 6/24/2021    Varicella      Present on Admission:   Severe pre-eclampsia in third trimester   Epigastric abdominal pain affecting pregnancy in third trimester   GBS bacteriuria   Obesity in pregnancy      Admitting Diagnosis: Pain of upper abdomen [R10 10]  Age/Sex: 35 y o  female  Admission Orders:  Scheduled Medications:  betamethasone acetate-betamethasone sodium phosphate, 12 mg, Intramuscular, Q24H  Labetalol HCl, 80 mg, Intravenous, Once  sodium chloride, 1,000 mL, Intravenous, Once  valACYclovir, 500 mg, Oral, Daily      Continuous IV Infusions:  magnesium sulfate, 2 g/hr, Intravenous, Continuous  sodium chloride, 50 mL/hr, Intravenous, Continuous      PRN Meds:       IP CONSULT TO PERINATOLOGY  IP CONSULT TO NEONATOLOGY   VS, I/O lung assessments and deep tendon reflexes q 2 hrs while on MGSO 4  Continuous external monitor  Blood sugars FBS and 2 hr PP      Network Utilization Review Department  ATTENTION: Please call with any questions or concerns to [phone] and carefully listen to the prompts so that you are directed to the right person  All voicemails are confidential   Elnor Raider all requests for admission clinical reviews, approved or denied determinations and any other requests to dedicated fax number below belonging to the campus where the patient is receiving treatment   List of dedicated fax numbers for the Facilities:  [address] DENIALS (Administrative/Medical Necessity) [phone]   [address] (Maternity/NICU/Pediatrics) [phone]   [address] [place] [address] [id number] [place] [address] [address] [place], [place] [address] [phone]

## 2021-06-24 NOTE — PLAN OF CARE
Problem: Knowledge Deficit  Goal: Verbalizes understanding of labor plan  Description: Assess patient/family/caregiver's baseline knowledge level and ability to understand information  Provide education via patient/family/caregiver's preferred learning method at appropriate level of understanding  1  Provide teaching at level of understanding  2  Provide teaching via preferred learning method(s)  Outcome: Progressing  Goal: Patient/family/caregiver demonstrates understanding of disease process, treatment plan, medications, and discharge instructions  Description: Complete learning assessment and assess knowledge base    Interventions:  - Provide teaching at level of understanding  - Provide teaching via preferred learning methods  Outcome: Progressing     Problem: PAIN - ADULT  Goal: Verbalizes/displays adequate comfort level or baseline comfort level  Description: Interventions:  - Encourage patient to monitor pain and request assistance  - Assess pain using appropriate pain scale  - Administer analgesics based on type and severity of pain and evaluate response  - Implement non-pharmacological measures as appropriate and evaluate response  - Consider cultural and social influences on pain and pain management  - Notify physician/advanced practitioner if interventions unsuccessful or patient reports new pain  Outcome: Progressing     Problem: INFECTION - ADULT  Goal: Absence or prevention of progression during hospitalization  Description: INTERVENTIONS:  - Assess and monitor for signs and symptoms of infection  - Monitor lab/diagnostic results  - Monitor all insertion sites, i e  indwelling lines, tubes, and drains  - Monitor endotracheal if appropriate and nasal secretions for changes in amount and color  - [place] appropriate cooling/warming therapies per order  - Administer medications as ordered  - Instruct and encourage patient and family to use good hand hygiene technique  - Identify and instruct in appropriate isolation precautions for identified infection/condition  Outcome: Progressing  Goal: Absence of fever/infection during neutropenic period  Description: INTERVENTIONS:  - Monitor WBC    Outcome: Progressing     Problem: SAFETY ADULT  Goal: Patient will remain free of falls  Description: INTERVENTIONS:  - Educate patient/family on patient safety including physical limitations  - Instruct patient to call for assistance with activity   - Consult OT/PT to assist with strengthening/mobility   - Keep Call bell within reach  - Keep bed low and locked with side rails adjusted as appropriate  - Keep care items and personal belongings within reach  - Initiate and maintain comfort rounds  - Make Fall Risk Sign visible to staff  - Apply yellow socks and bracelet for high fall risk patients  - Consider moving patient to room near nurses station  Outcome: Progressing  Goal: Maintain or return to baseline ADL function  Description: INTERVENTIONS:  -  Assess patient's ability to carry out ADLs; assess patient's baseline for ADL function and identify physical deficits which impact ability to perform ADLs (bathing, care of mouth/teeth, toileting, grooming, dressing, etc )  - Assess/evaluate cause of self-care deficits   - Assess range of motion  - Assess patient's mobility; develop plan if impaired  - Assess patient's need for assistive devices and provide as appropriate  - Encourage maximum independence but intervene and supervise when necessary  - Involve family in performance of ADLs  - Assess for home care needs following discharge   - Consider OT consult to assist with ADL evaluation and planning for discharge  - Provide patient education as appropriate  Outcome: Progressing  Goal: Maintains/Returns to pre admission functional level  Description: INTERVENTIONS:  - Perform BMAT or MOVE assessment daily    - Set and communicate daily mobility goal to care team and patient/family/caregiver     - Collaborate with rehabilitation services on mobility goals if consulted  - Out of bed for toileting  - Record patient progress and toleration of activity level   Outcome: Progressing     Problem: ANTEPARTUM  Goal: Maintain pregnancy as long as maternal and/or fetal condition is stable  Description: INTERVENTIONS:  - Maternal surveillance  - Fetal surveillance  - Monitor uterine activity  - Medications as ordered  - Bedrest  Outcome: Progressing

## 2021-06-24 NOTE — UTILIZATION REVIEW
Inpatient Admission Authorization Request   Notification of Maternity/Delivery &  Birth Information for Admission   SERVICING FACILITY:   [address] [place] [address] [facility]  Tax ID: [id number]  NPI: [id number]  Place of Service: Inpatient [address] [facility]  [address]  Place of Service Code: 24     ATTENDING PROVIDER:  Attending Name and NPI#: Anne Rebolledo Md [[id number]]  Address: [address] [place] [address], [place] [address] [facility]  Phone: [phone]     UTILIZATION REVIEW CONTACT:  Kitty Armendariz Utilization   Network Utilization Review Department  Phone: [phone]  Fax [phone]  Email: [email or web address]     PHYSICIAN ADVISORY SERVICES:  FOR RLNE-MU-NJJO REVIEW - MEDICAL NECESSITY DENIAL  Phone: [phone]  Fax: [phone]  Email: [email or web address]     TYPE OF REQUEST:  Inpatient Status     ADMISSION INFORMATION:  ADMISSION DATE/TIME: 21 12:29 AM  PATIENT DIAGNOSIS CODE/DESCRIPTION:  Pain of upper abdomen [R10 10] The primary encounter diagnosis was Pre-eclampsia, severe, third trimester  A diagnosis of 32 weeks gestation of pregnancy was also pertinent to this visit  1  Pre-eclampsia, severe, third trimester    2  32 weeks gestation of pregnancy      DISCHARGE DATE/TIME: No discharge date for patient encounter  DISCHARGE DISPOSITION (IF DISCHARGED): ED Dismiss - Never Arrived     MOTHER AND  INFORMATION:  Mother: Niels Runner 1991   Delivering clinician: This patient has no babies on file  OB History        2    Para        Term                AB   1    Living   0       SAB   1    TAB        Ectopic        Multiple        Live Births                   [place] Name & MRN:   This patient has no babies on file   Birth Information: 35 y o  female MRN: [id number] Unit/Bed#: L&D 326-01 Estimated Date of Delivery: 8/15/21  Birthweight: No birth weight on file  Gestational Age: <None> Delivery Type: This patient has no babies on file  APGARS  One minute Five minutes Ten minutes   Totals:               IMPORTANT INFORMATION:  Please contact the Lisandra Maxwell directly with any questions or concerns regarding this request  Department voicemails are confidential     Send requests for admission clinical reviews, concurrent reviews, approvals, and administrative denials due to lack of clinical to fax [phone]

## 2021-06-24 NOTE — H&P
H&P Exam - Obstetrics   Susy Jose 35 y o  female MRN: [id number]  Unit/Bed#: L&D 326-01 Encounter: [id number]      History of Present Illness     Chief Complaint: Abdominal pain    HPI:  Susy Jose is a 35 y o   female with an SERA of 8/15/2021, by Last Menstrual Period at 32w4d weeks gestation who is being admitted for abdominal pain most likely secondary to preeclampsia  She presented today for abdominal pain that started suddenly in the epigastric area after eating a [place] while working at [employer]  She describes the pain as a pinching pain that has increased in intensity and rates it as an 8/10  She denies any radiation, trauma or any previous pain ass/w food  She has tried taking Tums w/o any relief of her pain  Nothing makes the pain worse or better  She describes nausea and multiple episodes of emesis that contained food content and was nonbilious nonbloody  She has a recent history of gastroenteritis over the weekend which had resolved as well as some spotting which has also to resolved  She denies any diarrhea or constipation and sick contacts  She denies all symptoms of elevated blood pressure such as headaches, changes in vision, shortness of breath, chest pain  She has been able to urinate well and was hydrating well throughout the day until the pain started  Since then she has been unable to tolerate fluids and food  Contractions: denies  Vaginal Bleeding: some spotting last week    Loss of Fluid:denies  Fetal Movement:present  EFW: as of 3/31/21 0lb 13 oz %tile not measured    PREGNANCY COMPLICATIONS: obesity, GBS bacturia, Abnormal GTT; currently preeclampsia with severe features; Rubella non-immune; HSV 1 Ab +; Hep B status unknown    OB History    Para Term  AB Living   2       1 0   SAB TAB Ectopic Multiple Live Births   1              # Outcome Date GA Lbr Grupo/2nd Weight Sex Delivery Anes PTL Lv   2 Current            2013     SAB         Birth Comments: early first trimester SAB       Baby complications/comments: initially 160 baseline    Review of Systems   All other systems reviewed and are negative  Historical Information   Past Medical History:   Diagnosis Date    Abnormal Pap smear of cervix     Environmental allergies     Seasonal allergies     Varicella      Past Surgical History:   Procedure Laterality Date    NO PAST SURGERIES       Social History   Social History     Substance and Sexual Activity   Alcohol Use Not Currently    Comment: social/prior to pregnancy     Social History     Substance and Sexual Activity   Drug Use Never     Social History     Tobacco Use   Smoking Status Never Smoker   Smokeless Tobacco Never Used     Family History:   Family History   Problem Relation Age of Onset    Breast cancer Mother 46    No Known Problems Father     No Known Problems Maternal Grandmother     No Known Problems Paternal Grandmother     Heart attack Paternal Grandfather     Colon cancer Neg Hx     Ovarian cancer Neg Hx        Meds/Allergies    {  Medications Prior to Admission   Medication    Prenatal MV-Min-Fe Fum-FA-DHA (PRENATAL 1 PO)    Blood Glucose Monitoring Suppl (Contour Next EZ) w/Device KIT    glucose blood (Contour Next Test) test strip    Microlet Lancets MISC        Allergies   Allergen Reactions    Penicillins Rash       OBJECTIVE:    Vitals:   BP (!) 166/102 (BP Location: Right arm)   Pulse 58   Temp 98 6 °F (37 °C) (Oral)   LMP 11/08/2020 (Exact Date)   There is no height or weight on file to calculate BMI  Physical Exam  Vitals and nursing note reviewed  HENT:      Head: Normocephalic and atraumatic  Cardiovascular:      Rate and Rhythm: Normal rate and regular rhythm  Pulses: Normal pulses  Heart sounds: Normal heart sounds  Pulmonary:      Effort: Pulmonary effort is normal       Breath sounds: Normal breath sounds     Abdominal:      General: Bowel sounds are normal       Palpations: Abdomen is soft       Tenderness: There is no right CVA tenderness or left CVA tenderness  Comments: Tenderness not increased with palpation, gravid uterus   Musculoskeletal:      Right lower leg: No edema  Left lower leg: Edema present  Skin:     General: Skin is warm  Neurological:      General: No focal deficit present  Mental Status: She is alert and oriented to person, place, and time  FHT: baseline 150, moderate variability, 15 x 15 accelerations, no decelerations     TOCO: no contractions         Prenatal Labs:   Blood type: A+  Antibody Screen: negative  Rubella: non-immune  HIV: negative  RPR: negative  Hep B: negtive  Diabetes Screen: abnormal  GBS: unknown      Invasive Devices     Peripheral Intravenous Line            Peripheral IV 06/23/21 Left;Ventral (anterior) Forearm <1 day                  Assessment/Plan     ASSESSMENT:   IUP at 32w4d weeks gestation with pre-eclampsia with severe features  PLAN:   1) Admit to Labor and Delivery   2) Admit labs: CBC, CMP, UA, urine protein/creatinine   Ratio, RPR, type and screen, GBS   3) Meds: Zofran, Protonix, Labetalol, Mg,    Betamethasone, possible Abx for GBS coverage   4) IVF NS   5) consult MFM   6) possible IOL   7) Rubella non-immune; will need vaccine post-partum   8) HSV type 1 +      This patient will be an INPATIENT  and I certify the anticipated length of stay is >2 Midnights        Clayton Reed MD  6/24/2021  12:45 AM

## 2021-06-25 LAB
ALBUMIN SERPL BCP-MCNC: 2.2 G/DL (ref 3.5–5)
ALP SERPL-CCNC: 198 U/L (ref 46–116)
ALT SERPL W P-5'-P-CCNC: 97 U/L (ref 12–78)
ANION GAP SERPL CALCULATED.3IONS-SCNC: 9 MMOL/L (ref 4–13)
AST SERPL W P-5'-P-CCNC: 52 U/L (ref 5–45)
BILIRUB SERPL-MCNC: 0.19 MG/DL (ref 0.2–1)
BUN SERPL-MCNC: 7 MG/DL (ref 5–25)
CALCIUM ALBUM COR SERPL-MCNC: 8.9 MG/DL (ref 8.3–10.1)
CALCIUM SERPL-MCNC: 7.5 MG/DL (ref 8.3–10.1)
CHLORIDE SERPL-SCNC: 106 MMOL/L (ref 100–108)
CO2 SERPL-SCNC: 22 MMOL/L (ref 21–32)
CREAT SERPL-MCNC: 0.53 MG/DL (ref 0.6–1.3)
ERYTHROCYTE [DISTWIDTH] IN BLOOD BY AUTOMATED COUNT: 13.6 % (ref 11.6–15.1)
GFR SERPL CREATININE-BSD FRML MDRD: 129 ML/MIN/1.73SQ M
GLUCOSE SERPL-MCNC: 121 MG/DL (ref 65–140)
GLUCOSE SERPL-MCNC: 136 MG/DL (ref 65–140)
GLUCOSE SERPL-MCNC: 172 MG/DL (ref 65–140)
GLUCOSE SERPL-MCNC: 225 MG/DL (ref 65–140)
GLUCOSE SERPL-MCNC: 77 MG/DL (ref 65–140)
HCT VFR BLD AUTO: 29.5 % (ref 34.8–46.1)
HGB BLD-MCNC: 9.6 G/DL (ref 11.5–15.4)
MCH RBC QN AUTO: 27.2 PG (ref 26.8–34.3)
MCHC RBC AUTO-ENTMCNC: 32.5 G/DL (ref 31.4–37.4)
MCV RBC AUTO: 84 FL (ref 82–98)
PLATELET # BLD AUTO: 239 THOUSANDS/UL (ref 149–390)
PMV BLD AUTO: 12.3 FL (ref 8.9–12.7)
POTASSIUM SERPL-SCNC: 4.3 MMOL/L (ref 3.5–5.3)
PROT SERPL-MCNC: 6.7 G/DL (ref 6.4–8.2)
RBC # BLD AUTO: 3.53 MILLION/UL (ref 3.81–5.12)
SODIUM SERPL-SCNC: 137 MMOL/L (ref 136–145)
WBC # BLD AUTO: 8.59 THOUSAND/UL (ref 4.31–10.16)

## 2021-06-25 PROCEDURE — 85027 COMPLETE CBC AUTOMATED: CPT | Performed by: OBSTETRICS & GYNECOLOGY

## 2021-06-25 PROCEDURE — 99232 SBSQ HOSP IP/OBS MODERATE 35: CPT | Performed by: OBSTETRICS & GYNECOLOGY

## 2021-06-25 PROCEDURE — 82948 REAGENT STRIP/BLOOD GLUCOSE: CPT

## 2021-06-25 PROCEDURE — 80053 COMPREHEN METABOLIC PANEL: CPT | Performed by: OBSTETRICS & GYNECOLOGY

## 2021-06-25 RX ADMIN — VALACYCLOVIR HYDROCHLORIDE 500 MG: 500 TABLET, FILM COATED ORAL at 08:21

## 2021-06-25 RX ADMIN — BETAMETHASONE SODIUM PHOSPHATE AND BETAMETHASONE ACETATE 12 MG: 3; 3 INJECTION, SUSPENSION INTRA-ARTICULAR; INTRALESIONAL; INTRAMUSCULAR at 00:54

## 2021-06-25 RX ADMIN — IRON SUCROSE 200 MG: 20 INJECTION, SOLUTION INTRAVENOUS at 17:59

## 2021-06-25 NOTE — PLAN OF CARE
Problem: Knowledge Deficit  Goal: Verbalizes understanding of labor plan  Description: Assess patient/family/caregiver's baseline knowledge level and ability to understand information  Provide education via patient/family/caregiver's preferred learning method at appropriate level of understanding  1  Provide teaching at level of understanding  2  Provide teaching via preferred learning method(s)  Outcome: Progressing  Goal: Patient/family/caregiver demonstrates understanding of disease process, treatment plan, medications, and discharge instructions  Description: Complete learning assessment and assess knowledge base  Interventions:  - Provide teaching at level of understanding  - Provide teaching via preferred learning methods  Outcome: Progressing     Problem: Labor & Delivery  Goal: Manages discomfort  Description: Assess and monitor for signs and symptoms of discomfort  Assess patient's pain level regularly and per hospital policy  Administer medications as ordered  Support use of nonpharmacological methods to help control pain such as distraction, imagery, relaxation, and application of heat and cold  Collaborate with interdisciplinary team and patient to determine appropriate pain management plan  1  Include patient in decisions related to comfort  2  Offer non-pharmacological pain management interventions  3  Report ineffective pain management to physician  Outcome: Progressing  Goal: Patient vital signs are stable  Description: 1  Assess vital signs - vaginal delivery    Outcome: Progressing     Problem: PAIN - ADULT  Goal: Verbalizes/displays adequate comfort level or baseline comfort level  Description: Interventions:  - Encourage patient to monitor pain and request assistance  - Assess pain using appropriate pain scale  - Administer analgesics based on type and severity of pain and evaluate response  - Implement non-pharmacological measures as appropriate and evaluate response  - Consider cultural and social influences on pain and pain management  - Notify physician/advanced practitioner if interventions unsuccessful or patient reports new pain  Outcome: Progressing     Problem: INFECTION - ADULT  Goal: Absence or prevention of progression during hospitalization  Description: INTERVENTIONS:  - Assess and monitor for signs and symptoms of infection  - Monitor lab/diagnostic results  - Monitor all insertion sites, i e  indwelling lines, tubes, and drains  - Monitor endotracheal if appropriate and nasal secretions for changes in amount and color  - [place] appropriate cooling/warming therapies per order  - Administer medications as ordered  - Instruct and encourage patient and family to use good hand hygiene technique  - Identify and instruct in appropriate isolation precautions for identified infection/condition  Outcome: Progressing  Goal: Absence of fever/infection during neutropenic period  Description: INTERVENTIONS:  - Monitor WBC    Outcome: Progressing     Problem: SAFETY ADULT  Goal: Patient will remain free of falls  Description: INTERVENTIONS:  - Educate patient/family on patient safety including physical limitations  - Instruct patient to call for assistance with activity   - Consult OT/PT to assist with strengthening/mobility   - Keep Call bell within reach  - Keep bed low and locked with side rails adjusted as appropriate  - Keep care items and personal belongings within reach  - Initiate and maintain comfort rounds  - Make Fall Risk Sign visible to staff  - Offer Toileting , in advance of need  - Initiate/Maintain alarm  - Obtain necessary fall risk management equipment:   - Apply yellow socks and bracelet for high fall risk patients  - Consider moving patient to room near nurses station  Outcome: Progressing  Goal: Maintain or return to baseline ADL function  Description: INTERVENTIONS:  -  Assess patient's ability to carry out ADLs; assess patient's baseline for ADL function and identify physical deficits which impact ability to perform ADLs (bathing, care of mouth/teeth, toileting, grooming, dressing, etc )  - Assess/evaluate cause of self-care deficits   - Assess range of motion  - Assess patient's mobility; develop plan if impaired  - Assess patient's need for assistive devices and provide as appropriate  - Encourage maximum independence but intervene and supervise when necessary  - Involve family in performance of ADLs  - Assess for home care needs following discharge   - Consider OT consult to assist with ADL evaluation and planning for discharge  - Provide patient education as appropriate  Outcome: Progressing       Problem: DISCHARGE PLANNING  Goal: Discharge to home or other facility with appropriate resources  Description: INTERVENTIONS:  - Identify barriers to discharge w/patient and caregiver  - Arrange for needed discharge resources and transportation as appropriate  - Identify discharge learning needs (meds, wound care, etc )  - Arrange for interpretive services to assist at discharge as needed  - Refer to Case Management Department for coordinating discharge planning if the patient needs post-hospital services based on physician/advanced practitioner order or complex needs related to functional status, cognitive ability, or social support system  Outcome: Progressing

## 2021-06-25 NOTE — PROGRESS NOTES
Progress Note - Maternal Fetal Medicine   Mansi See 35 y o  female MRN: [id number]  Unit/Bed#: L&D 328-01 Encounter: [id number]    Assessment:  35 y o  Raymundo Lindsey at 32w5d admitted with pre-eclampsia with severe features by blood pressure  Hospital day 2  Plan:  Pre-eclampsia with severe features  Systolic (48SQQ), YTN:698 , Min:111 , ZIR:717   Diastolic (02QCD), NANCY:83, Min:71, Max:100  Asymptomatic at this time  Last SRBP 0300 6/24/21, last treated with 40mg IV labetalol 0100 6/24/21  Daily CBC/CMP to monitor for evolving HELLP syndrome  S/p Magnesium sulfate  Anticipate delivery at 34 weeks per ACOG recommendation or sooner if clinically indicated    IUP at 32w5d  NST BID  Betamethasone 6/24/21-6/25/21  Appreciate MFM consult  Appreciate NICU consult  Per ultrasound 6/25/21, Vertex presentation, Anterior placenta, EFW 4lbs 10oz (52%ile), GENO 18 3cm, normal dopplers   Bladder enlarged/distended, possible dilation of distal ureters    HSV, IgG positive  Started on Valtrex 6/24/21  Asymptomatic  Will need pelvic exam prior to anticipated vaginal delivery    Elevated 1h GTT  Continue glucose checks in AM and 2h postprandial  - yesterday, ranged   Consider discontinuation if continue to be well controlled even in setting of recent betamethasone administration    Physiologic anemia of pregnancy  Hgb 9 6 today  Discussed with patient initiation of Venofer infusions  Patient may be high risk for PPH when progress to delivery    GBS bacteruria  Will need prophylaxis in labor    Rubella NI  Will need MMR postpartum    FEN: Regular diet  DVT ppx: SCDs, ambulation      Subjective/Objective   Subjective:     Contractions: no  Loss of fluid: no  Vaginal bleeding: no  Fetal movement: yes    Pain: no  Tolerating PO: yes  Voiding: yes  Ambulating: yes  Headaches: no  Visual changes: no  Chest pain: no  Shortness of breath: no  Nausea: no  Vomiting/Diarrhea: no  Dysuria: no  Leg pain: no    Patient reports that "I can see the bones in my feet again! I couldn't see them this weekend"    Objective:     Vitals:   Temp:  [97 9 °F (36 6 °C)-98 3 °F (36 8 °C)] 98 1 °F (36 7 °C)  HR:  [] 98  Resp:  [16-18] 18  BP: (111-143)/() 117/75       Physical Exam  Constitutional:       General: She is not in acute distress  Appearance: Normal appearance  She is not ill-appearing or toxic-appearing  HENT:      Head: Normocephalic and atraumatic  Eyes:      General: No scleral icterus  Conjunctiva/sclera: Conjunctivae normal    Cardiovascular:      Rate and Rhythm: Normal rate and regular rhythm  Pulses: Normal pulses  Heart sounds: Normal heart sounds  No murmur heard  No friction rub  No gallop  Pulmonary:      Effort: Pulmonary effort is normal  No respiratory distress  Breath sounds: Normal breath sounds  No wheezing, rhonchi or rales  Abdominal:      General: There is no distension  Palpations: Abdomen is soft  Tenderness: There is no abdominal tenderness  There is no guarding or rebound  Comments: gravid   Musculoskeletal:         General: No swelling  Normal range of motion  Cervical back: Normal range of motion  Right lower leg: No edema  Left lower leg: No edema  Skin:     General: Skin is warm and dry  Findings: No bruising, erythema or lesion  Neurological:      General: No focal deficit present  Mental Status: She is alert  Mental status is at baseline     Psychiatric:         Mood and Affect: Mood normal          Behavior: Behavior normal            Fetal Assessment:  FHT: 120 / Moderate 6 - 25 bpm / 15x15 accelerations present, reactive  [place]: none    Lab, Imaging and other studies:     Lab Results   Component Value Date    WBC 8 59 06/25/2021    HGB 9 6 (L) 06/25/2021    HCT 29 5 (L) 06/25/2021    MCV 84 06/25/2021     06/25/2021       Lab Results   Component Value Date    GLUCOSE 115 01/15/2016    CALCIUM 7 5 (L) 06/25/2021    K 4 3 06/25/2021    CO2 22 06/25/2021     06/25/2021    BUN 7 06/25/2021    CREATININE 0 53 (L) 06/25/2021       Lab Results   Component Value Date    ALT 97 (H) 06/25/2021    AST 52 (H) 06/25/2021    ALKPHOS 198 (H) 06/25/2021       Mame Gomez MD  OBN, R-3  6/25/2021  6:14 AM

## 2021-06-25 NOTE — PLAN OF CARE
Problem: Knowledge Deficit  Goal: Verbalizes understanding of labor plan  Description: Assess patient/family/caregiver's baseline knowledge level and ability to understand information  Provide education via patient/family/caregiver's preferred learning method at appropriate level of understanding  1  Provide teaching at level of understanding  2  Provide teaching via preferred learning method(s)  Outcome: Progressing  Goal: Patient/family/caregiver demonstrates understanding of disease process, treatment plan, medications, and discharge instructions  Description: Complete learning assessment and assess knowledge base  Interventions:  - Provide teaching at level of understanding  - Provide teaching via preferred learning methods  Outcome: Progressing     Problem: Labor & Delivery  Goal: Manages discomfort  Description: Assess and monitor for signs and symptoms of discomfort  Assess patient's pain level regularly and per hospital policy  Administer medications as ordered  Support use of nonpharmacological methods to help control pain such as distraction, imagery, relaxation, and application of heat and cold  Collaborate with interdisciplinary team and patient to determine appropriate pain management plan  1  Include patient in decisions related to comfort  2  Offer non-pharmacological pain management interventions  3  Report ineffective pain management to physician  Outcome: Progressing  Goal: Patient vital signs are stable  Description: 1  Assess vital signs - vaginal delivery    Outcome: Progressing     Problem: PAIN - ADULT  Goal: Verbalizes/displays adequate comfort level or baseline comfort level  Description: Interventions:  - Encourage patient to monitor pain and request assistance  - Assess pain using appropriate pain scale  - Administer analgesics based on type and severity of pain and evaluate response  - Implement non-pharmacological measures as appropriate and evaluate response  - Consider cultural and social influences on pain and pain management  - Notify physician/advanced practitioner if interventions unsuccessful or patient reports new pain  Outcome: Progressing     Problem: INFECTION - ADULT  Goal: Absence or prevention of progression during hospitalization  Description: INTERVENTIONS:  - Assess and monitor for signs and symptoms of infection  - Monitor lab/diagnostic results  - Monitor all insertion sites, i e  indwelling lines, tubes, and drains  - Monitor endotracheal if appropriate and nasal secretions for changes in amount and color  - [place] appropriate cooling/warming therapies per order  - Administer medications as ordered  - Instruct and encourage patient and family to use good hand hygiene technique  - Identify and instruct in appropriate isolation precautions for identified infection/condition  Outcome: Progressing  Goal: Absence of fever/infection during neutropenic period  Description: INTERVENTIONS:  - Monitor WBC    Outcome: Progressing     Problem: SAFETY ADULT  Goal: Patient will remain free of falls  Description: INTERVENTIONS:  - Educate patient/family on patient safety including physical limitations  - Instruct patient to call for assistance with activity   - Consult OT/PT to assist with strengthening/mobility   - Keep Call bell within reach  - Keep bed low and locked with side rails adjusted as appropriate  - Keep care items and personal belongings within reach  - Initiate and maintain comfort rounds  - Make Fall Risk Sign visible to staff  - Apply yellow socks and bracelet for high fall risk patients  - Consider moving patient to room near nurses station  Outcome: Progressing  Goal: Maintain or return to baseline ADL function  Description: INTERVENTIONS:  -  Assess patient's ability to carry out ADLs; assess patient's baseline for ADL function and identify physical deficits which impact ability to perform ADLs (bathing, care of mouth/teeth, toileting, grooming,

## 2021-06-26 LAB
ALBUMIN SERPL BCP-MCNC: 2.1 G/DL (ref 3.5–5)
ALBUMIN SERPL BCP-MCNC: 2.2 G/DL (ref 3.5–5)
ALP SERPL-CCNC: 189 U/L (ref 46–116)
ALP SERPL-CCNC: 192 U/L (ref 46–116)
ALT SERPL W P-5'-P-CCNC: 125 U/L (ref 12–78)
ALT SERPL W P-5'-P-CCNC: 154 U/L (ref 12–78)
ANION GAP SERPL CALCULATED.3IONS-SCNC: 11 MMOL/L (ref 4–13)
ANION GAP SERPL CALCULATED.3IONS-SCNC: 11 MMOL/L (ref 4–13)
AST SERPL W P-5'-P-CCNC: 56 U/L (ref 5–45)
AST SERPL W P-5'-P-CCNC: 69 U/L (ref 5–45)
BACTERIA UR CULT: ABNORMAL
BILIRUB SERPL-MCNC: <0.1 MG/DL (ref 0.2–1)
BILIRUB SERPL-MCNC: <0.1 MG/DL (ref 0.2–1)
BUN SERPL-MCNC: 12 MG/DL (ref 5–25)
BUN SERPL-MCNC: 8 MG/DL (ref 5–25)
CALCIUM ALBUM COR SERPL-MCNC: 10.2 MG/DL (ref 8.3–10.1)
CALCIUM ALBUM COR SERPL-MCNC: 9.7 MG/DL (ref 8.3–10.1)
CALCIUM SERPL-MCNC: 8.2 MG/DL (ref 8.3–10.1)
CALCIUM SERPL-MCNC: 8.8 MG/DL (ref 8.3–10.1)
CHLORIDE SERPL-SCNC: 106 MMOL/L (ref 100–108)
CHLORIDE SERPL-SCNC: 108 MMOL/L (ref 100–108)
CO2 SERPL-SCNC: 21 MMOL/L (ref 21–32)
CO2 SERPL-SCNC: 22 MMOL/L (ref 21–32)
CREAT SERPL-MCNC: 0.58 MG/DL (ref 0.6–1.3)
CREAT SERPL-MCNC: 0.62 MG/DL (ref 0.6–1.3)
ERYTHROCYTE [DISTWIDTH] IN BLOOD BY AUTOMATED COUNT: 13.4 % (ref 11.6–15.1)
ERYTHROCYTE [DISTWIDTH] IN BLOOD BY AUTOMATED COUNT: 13.4 % (ref 11.6–15.1)
GFR SERPL CREATININE-BSD FRML MDRD: 122 ML/MIN/1.73SQ M
GFR SERPL CREATININE-BSD FRML MDRD: 125 ML/MIN/1.73SQ M
GLUCOSE SERPL-MCNC: 65 MG/DL (ref 65–140)
GLUCOSE SERPL-MCNC: 74 MG/DL (ref 65–140)
GLUCOSE SERPL-MCNC: 74 MG/DL (ref 65–140)
GLUCOSE SERPL-MCNC: 75 MG/DL (ref 65–140)
GLUCOSE SERPL-MCNC: 91 MG/DL (ref 65–140)
GLUCOSE SERPL-MCNC: 95 MG/DL (ref 65–140)
GLUCOSE SERPL-MCNC: 95 MG/DL (ref 65–140)
HCT VFR BLD AUTO: 30.2 % (ref 34.8–46.1)
HCT VFR BLD AUTO: 31 % (ref 34.8–46.1)
HGB BLD-MCNC: 10.1 G/DL (ref 11.5–15.4)
HGB BLD-MCNC: 9.7 G/DL (ref 11.5–15.4)
MCH RBC QN AUTO: 27.3 PG (ref 26.8–34.3)
MCH RBC QN AUTO: 27.7 PG (ref 26.8–34.3)
MCHC RBC AUTO-ENTMCNC: 32.1 G/DL (ref 31.4–37.4)
MCHC RBC AUTO-ENTMCNC: 32.6 G/DL (ref 31.4–37.4)
MCV RBC AUTO: 85 FL (ref 82–98)
MCV RBC AUTO: 85 FL (ref 82–98)
PLATELET # BLD AUTO: 237 THOUSANDS/UL (ref 149–390)
PLATELET # BLD AUTO: 238 THOUSANDS/UL (ref 149–390)
PMV BLD AUTO: 12.5 FL (ref 8.9–12.7)
PMV BLD AUTO: 13.5 FL (ref 8.9–12.7)
POTASSIUM SERPL-SCNC: 3.9 MMOL/L (ref 3.5–5.3)
POTASSIUM SERPL-SCNC: 4 MMOL/L (ref 3.5–5.3)
PROT SERPL-MCNC: 6.5 G/DL (ref 6.4–8.2)
PROT SERPL-MCNC: 6.8 G/DL (ref 6.4–8.2)
RBC # BLD AUTO: 3.55 MILLION/UL (ref 3.81–5.12)
RBC # BLD AUTO: 3.65 MILLION/UL (ref 3.81–5.12)
SODIUM SERPL-SCNC: 139 MMOL/L (ref 136–145)
SODIUM SERPL-SCNC: 140 MMOL/L (ref 136–145)
WBC # BLD AUTO: 10.53 THOUSAND/UL (ref 4.31–10.16)
WBC # BLD AUTO: 9.98 THOUSAND/UL (ref 4.31–10.16)

## 2021-06-26 PROCEDURE — 82948 REAGENT STRIP/BLOOD GLUCOSE: CPT

## 2021-06-26 PROCEDURE — 80053 COMPREHEN METABOLIC PANEL: CPT | Performed by: OBSTETRICS & GYNECOLOGY

## 2021-06-26 PROCEDURE — NC001 PR NO CHARGE: Performed by: OBSTETRICS & GYNECOLOGY

## 2021-06-26 PROCEDURE — 85027 COMPLETE CBC AUTOMATED: CPT | Performed by: OBSTETRICS & GYNECOLOGY

## 2021-06-26 PROCEDURE — 59025 FETAL NON-STRESS TEST: CPT | Performed by: OBSTETRICS & GYNECOLOGY

## 2021-06-26 PROCEDURE — 99232 SBSQ HOSP IP/OBS MODERATE 35: CPT | Performed by: OBSTETRICS & GYNECOLOGY

## 2021-06-26 RX ORDER — MAGNESIUM SULFATE HEPTAHYDRATE 40 MG/ML
2 INJECTION, SOLUTION INTRAVENOUS CONTINUOUS
Status: DISCONTINUED | OUTPATIENT
Start: 2021-06-26 | End: 2021-06-29

## 2021-06-26 RX ORDER — MAGNESIUM SULFATE HEPTAHYDRATE 40 MG/ML
2 INJECTION, SOLUTION INTRAVENOUS ONCE
Status: COMPLETED | OUTPATIENT
Start: 2021-06-26 | End: 2021-06-27

## 2021-06-26 RX ORDER — LABETALOL 20 MG/4 ML (5 MG/ML) INTRAVENOUS SYRINGE
20 ONCE
Status: COMPLETED | OUTPATIENT
Start: 2021-06-26 | End: 2021-06-26

## 2021-06-26 RX ORDER — CALCIUM GLUCONATE 94 MG/ML
1 INJECTION, SOLUTION INTRAVENOUS ONCE AS NEEDED
Status: DISCONTINUED | OUTPATIENT
Start: 2021-06-26 | End: 2021-06-30 | Stop reason: HOSPADM

## 2021-06-26 RX ORDER — NIFEDIPINE 30 MG/1
30 TABLET, EXTENDED RELEASE ORAL DAILY
Status: DISCONTINUED | OUTPATIENT
Start: 2021-06-26 | End: 2021-06-30 | Stop reason: HOSPADM

## 2021-06-26 RX ORDER — SODIUM CHLORIDE 9 MG/ML
25 INJECTION, SOLUTION INTRAVENOUS CONTINUOUS
Status: DISCONTINUED | OUTPATIENT
Start: 2021-06-26 | End: 2021-06-30 | Stop reason: HOSPADM

## 2021-06-26 RX ORDER — MAGNESIUM SULFATE HEPTAHYDRATE 40 MG/ML
4 INJECTION, SOLUTION INTRAVENOUS ONCE
Status: COMPLETED | OUTPATIENT
Start: 2021-06-26 | End: 2021-06-27

## 2021-06-26 RX ORDER — LABETALOL 20 MG/4 ML (5 MG/ML) INTRAVENOUS SYRINGE
Status: COMPLETED
Start: 2021-06-26 | End: 2021-06-26

## 2021-06-26 RX ADMIN — LABETALOL 20 MG/4 ML (5 MG/ML) INTRAVENOUS SYRINGE 20 MG: at 21:41

## 2021-06-26 RX ADMIN — NIFEDIPINE 30 MG: 30 TABLET, FILM COATED, EXTENDED RELEASE ORAL at 16:07

## 2021-06-26 RX ADMIN — VALACYCLOVIR HYDROCHLORIDE 500 MG: 500 TABLET, FILM COATED ORAL at 08:43

## 2021-06-26 RX ADMIN — MAGNESIUM SULFATE HEPTAHYDRATE 4 G: 40 INJECTION, SOLUTION INTRAVENOUS at 23:48

## 2021-06-26 RX ADMIN — LABETALOL 20 MG/4 ML (5 MG/ML) INTRAVENOUS SYRINGE 20 MG: at 16:08

## 2021-06-26 RX ADMIN — SODIUM CHLORIDE 25 ML/HR: 0.9 INJECTION, SOLUTION INTRAVENOUS at 23:46

## 2021-06-26 RX ADMIN — MISOPROSTOL 25 MCG: 100 TABLET ORAL at 23:54

## 2021-06-26 NOTE — PROGRESS NOTES
Progress Note - Maternal Fetal Medicine   Wilma Luna 35 y o  female MRN: [id number]  Unit/Bed#: L&D 328-01 Encounter: [id number]    Assessment:  35 y o   at [address] [place] [address] admitted with preeclampsia with severe features based on severe range blood pressures, epigastric pain that is now resolved, and P/c 4 2  Now hospital day 2  Plan:    1  Preeclampsia w/ SF  - Bps this afternoon with two severe range blood pressures, sp labetalol 20 and started on Procardia XL 30 mg   - S/p 24 hours of magnesium; plan to restart magnesium when decision is made for delivery  - continues to be asymptomatic  - BTM -  - Labs Q24H: AST/ALT 52/97--> 36/59--> 37/54, CBC WNL; will continue to trend daily labs  - NST BID  - Plan for delivery at 34 weeks or sooner if clinically indicated    2  Anemia  - S/p Venofer   - Hgb 9 7     3  HSV  - Valtrex started     4  Elevated 1 hr GTT  - Glucose checks in AM and 2H PP  - some values have been elevated, however patient received betamethasone, so will not treat at this time    5  GBS bacteruria   - Will need PCN intrapartum     5  FEN  - Regular diet    6  DVT ppx  - SCDs  - Heparin unnecessary at this time since patient is ambulating well      D/w Dr Tim Irvin and Dr Mick Lema      Patient reports that she is doing well today  Denies headaches, visual changes, chest pain, shortness of breath, RUQ/epigastric pain    Subjective:     Contractions: no  Loss of fluid: no  Vaginal bleeding: no  Fetal movement: yes    Objective:     Vitals:   Temp:  [97 8 °F (36 6 °C)-98 3 °F (36 8 °C)] 98 1 °F (36 7 °C)  HR:  [55-88] 88  Resp:  [18] 18  BP: (134-164)/() 138/88       Physical Exam  Constitutional:       General: She is not in acute distress  Appearance: She is well-developed  She is not ill-appearing or toxic-appearing  HENT:      Head: Normocephalic and atraumatic  Pulmonary:      Effort: Pulmonary effort is normal  No respiratory distress  Abdominal:      Palpations: Abdomen is soft  Tenderness: There is no abdominal tenderness  There is no guarding or rebound  Comments: Gravid uterus   Musculoskeletal:         General: No tenderness  Skin:     General: Skin is warm and dry  Neurological:      General: No focal deficit present  Mental Status: She is alert and oriented to person, place, and time  Mental status is at baseline  Comments: No clonus   Psychiatric:         Behavior: Behavior normal          Thought Content:  Thought content normal            Fetal Assessment:  FHT: 130 BPM, reactive with moderate variability and no deceleration  [place]: None         Lab Results   Component Value Date    WBC 9 98 06/26/2021    HGB 9 7 (L) 06/26/2021    HCT 30 2 (L) 06/26/2021    MCV 85 06/26/2021     06/26/2021       Lab Results   Component Value Date    GLUCOSE 115 01/15/2016    CALCIUM 8 2 (L) 06/26/2021    K 3 9 06/26/2021    CO2 21 06/26/2021     06/26/2021    BUN 8 06/26/2021    CREATININE 0 62 06/26/2021       Lab Results   Component Value Date     (H) 06/26/2021    AST 56 (H) 06/26/2021    ALKPHOS 192 (H) 06/26/2021       Jcarlos Vargas MD    6/26/2021  4:45 PM

## 2021-06-27 ENCOUNTER — ANESTHESIA EVENT (INPATIENT)
Dept: ANESTHESIOLOGY | Facility: HOSPITAL | Age: 31
End: 2021-06-27
Payer: COMMERCIAL

## 2021-06-27 ENCOUNTER — ANESTHESIA (INPATIENT)
Dept: ANESTHESIOLOGY | Facility: HOSPITAL | Age: 31
End: 2021-06-27
Payer: COMMERCIAL

## 2021-06-27 LAB
ALBUMIN SERPL BCP-MCNC: 2.3 G/DL (ref 3.5–5)
ALBUMIN SERPL BCP-MCNC: 2.3 G/DL (ref 3.5–5)
ALBUMIN SERPL BCP-MCNC: 2.4 G/DL (ref 3.5–5)
ALP SERPL-CCNC: 197 U/L (ref 46–116)
ALP SERPL-CCNC: 199 U/L (ref 46–116)
ALP SERPL-CCNC: 218 U/L (ref 46–116)
ALT SERPL W P-5'-P-CCNC: 176 U/L (ref 12–78)
ALT SERPL W P-5'-P-CCNC: 178 U/L (ref 12–78)
ALT SERPL W P-5'-P-CCNC: 180 U/L (ref 12–78)
ANION GAP SERPL CALCULATED.3IONS-SCNC: 11 MMOL/L (ref 4–13)
ANION GAP SERPL CALCULATED.3IONS-SCNC: 12 MMOL/L (ref 4–13)
ANION GAP SERPL CALCULATED.3IONS-SCNC: 9 MMOL/L (ref 4–13)
AST SERPL W P-5'-P-CCNC: 71 U/L (ref 5–45)
AST SERPL W P-5'-P-CCNC: 75 U/L (ref 5–45)
AST SERPL W P-5'-P-CCNC: 82 U/L (ref 5–45)
BILIRUB SERPL-MCNC: 0.11 MG/DL (ref 0.2–1)
BILIRUB SERPL-MCNC: 0.14 MG/DL (ref 0.2–1)
BILIRUB SERPL-MCNC: 0.22 MG/DL (ref 0.2–1)
BUN SERPL-MCNC: 10 MG/DL (ref 5–25)
BUN SERPL-MCNC: 10 MG/DL (ref 5–25)
BUN SERPL-MCNC: 9 MG/DL (ref 5–25)
CALCIUM ALBUM COR SERPL-MCNC: 8.8 MG/DL (ref 8.3–10.1)
CALCIUM ALBUM COR SERPL-MCNC: 9 MG/DL (ref 8.3–10.1)
CALCIUM ALBUM COR SERPL-MCNC: 9.8 MG/DL (ref 8.3–10.1)
CALCIUM SERPL-MCNC: 7.5 MG/DL (ref 8.3–10.1)
CALCIUM SERPL-MCNC: 7.6 MG/DL (ref 8.3–10.1)
CALCIUM SERPL-MCNC: 8.4 MG/DL (ref 8.3–10.1)
CHLORIDE SERPL-SCNC: 101 MMOL/L (ref 100–108)
CHLORIDE SERPL-SCNC: 102 MMOL/L (ref 100–108)
CHLORIDE SERPL-SCNC: 103 MMOL/L (ref 100–108)
CO2 SERPL-SCNC: 22 MMOL/L (ref 21–32)
CREAT SERPL-MCNC: 0.48 MG/DL (ref 0.6–1.3)
CREAT SERPL-MCNC: 0.54 MG/DL (ref 0.6–1.3)
CREAT SERPL-MCNC: 0.61 MG/DL (ref 0.6–1.3)
ERYTHROCYTE [DISTWIDTH] IN BLOOD BY AUTOMATED COUNT: 13.4 % (ref 11.6–15.1)
ERYTHROCYTE [DISTWIDTH] IN BLOOD BY AUTOMATED COUNT: 13.4 % (ref 11.6–15.1)
ERYTHROCYTE [DISTWIDTH] IN BLOOD BY AUTOMATED COUNT: 13.5 % (ref 11.6–15.1)
GFR SERPL CREATININE-BSD FRML MDRD: 123 ML/MIN/1.73SQ M
GFR SERPL CREATININE-BSD FRML MDRD: 128 ML/MIN/1.73SQ M
GFR SERPL CREATININE-BSD FRML MDRD: 133 ML/MIN/1.73SQ M
GLUCOSE SERPL-MCNC: 105 MG/DL (ref 65–140)
GLUCOSE SERPL-MCNC: 85 MG/DL (ref 65–140)
GLUCOSE SERPL-MCNC: 86 MG/DL (ref 65–140)
HCT VFR BLD AUTO: 31.6 % (ref 34.8–46.1)
HCT VFR BLD AUTO: 32.5 % (ref 34.8–46.1)
HCT VFR BLD AUTO: 33.2 % (ref 34.8–46.1)
HGB BLD-MCNC: 10.3 G/DL (ref 11.5–15.4)
HGB BLD-MCNC: 10.6 G/DL (ref 11.5–15.4)
HGB BLD-MCNC: 10.8 G/DL (ref 11.5–15.4)
MCH RBC QN AUTO: 27.1 PG (ref 26.8–34.3)
MCH RBC QN AUTO: 27.4 PG (ref 26.8–34.3)
MCH RBC QN AUTO: 27.4 PG (ref 26.8–34.3)
MCHC RBC AUTO-ENTMCNC: 32.5 G/DL (ref 31.4–37.4)
MCHC RBC AUTO-ENTMCNC: 32.6 G/DL (ref 31.4–37.4)
MCHC RBC AUTO-ENTMCNC: 32.6 G/DL (ref 31.4–37.4)
MCV RBC AUTO: 83 FL (ref 82–98)
MCV RBC AUTO: 84 FL (ref 82–98)
MCV RBC AUTO: 84 FL (ref 82–98)
PLATELET # BLD AUTO: 253 THOUSANDS/UL (ref 149–390)
PLATELET # BLD AUTO: 268 THOUSANDS/UL (ref 149–390)
PLATELET # BLD AUTO: 275 THOUSANDS/UL (ref 149–390)
PMV BLD AUTO: 12.1 FL (ref 8.9–12.7)
PMV BLD AUTO: 12.3 FL (ref 8.9–12.7)
PMV BLD AUTO: 12.6 FL (ref 8.9–12.7)
POTASSIUM SERPL-SCNC: 3.6 MMOL/L (ref 3.5–5.3)
POTASSIUM SERPL-SCNC: 3.6 MMOL/L (ref 3.5–5.3)
POTASSIUM SERPL-SCNC: 3.7 MMOL/L (ref 3.5–5.3)
PROT SERPL-MCNC: 6.8 G/DL (ref 6.4–8.2)
PROT SERPL-MCNC: 7.1 G/DL (ref 6.4–8.2)
PROT SERPL-MCNC: 7.1 G/DL (ref 6.4–8.2)
RBC # BLD AUTO: 3.76 MILLION/UL (ref 3.81–5.12)
RBC # BLD AUTO: 3.87 MILLION/UL (ref 3.81–5.12)
RBC # BLD AUTO: 3.98 MILLION/UL (ref 3.81–5.12)
SODIUM SERPL-SCNC: 132 MMOL/L (ref 136–145)
SODIUM SERPL-SCNC: 135 MMOL/L (ref 136–145)
SODIUM SERPL-SCNC: 137 MMOL/L (ref 136–145)
WBC # BLD AUTO: 11.65 THOUSAND/UL (ref 4.31–10.16)
WBC # BLD AUTO: 12.41 THOUSAND/UL (ref 4.31–10.16)
WBC # BLD AUTO: 15.08 THOUSAND/UL (ref 4.31–10.16)

## 2021-06-27 PROCEDURE — 3E033VJ INTRODUCTION OF OTHER HORMONE INTO PERIPHERAL VEIN, PERCUTANEOUS APPROACH: ICD-10-PCS | Performed by: OBSTETRICS & GYNECOLOGY

## 2021-06-27 PROCEDURE — 85027 COMPLETE CBC AUTOMATED: CPT | Performed by: OBSTETRICS & GYNECOLOGY

## 2021-06-27 PROCEDURE — 80053 COMPREHEN METABOLIC PANEL: CPT | Performed by: OBSTETRICS & GYNECOLOGY

## 2021-06-27 PROCEDURE — NC001 PR NO CHARGE: Performed by: OBSTETRICS & GYNECOLOGY

## 2021-06-27 PROCEDURE — 4A1HXCZ MONITORING OF PRODUCTS OF CONCEPTION, CARDIAC RATE, EXTERNAL APPROACH: ICD-10-PCS | Performed by: OBSTETRICS & GYNECOLOGY

## 2021-06-27 PROCEDURE — 3E0DXGC INTRODUCTION OF OTHER THERAPEUTIC SUBSTANCE INTO MOUTH AND PHARYNX, EXTERNAL APPROACH: ICD-10-PCS | Performed by: OBSTETRICS & GYNECOLOGY

## 2021-06-27 PROCEDURE — 10907ZC DRAINAGE OF AMNIOTIC FLUID, THERAPEUTIC FROM PRODUCTS OF CONCEPTION, VIA NATURAL OR ARTIFICIAL OPENING: ICD-10-PCS | Performed by: OBSTETRICS & GYNECOLOGY

## 2021-06-27 RX ORDER — LIDOCAINE HYDROCHLORIDE AND EPINEPHRINE 15; 5 MG/ML; UG/ML
INJECTION, SOLUTION EPIDURAL
Status: COMPLETED | OUTPATIENT
Start: 2021-06-27 | End: 2021-06-27

## 2021-06-27 RX ORDER — ROPIVACAINE HYDROCHLORIDE 2 MG/ML
INJECTION, SOLUTION EPIDURAL; INFILTRATION; PERINEURAL
Status: COMPLETED
Start: 2021-06-27 | End: 2021-06-27

## 2021-06-27 RX ORDER — LABETALOL 20 MG/4 ML (5 MG/ML) INTRAVENOUS SYRINGE
Status: COMPLETED
Start: 2021-06-27 | End: 2021-06-27

## 2021-06-27 RX ORDER — ACETAMINOPHEN 325 MG/1
650 TABLET ORAL EVERY 6 HOURS PRN
Status: DISCONTINUED | OUTPATIENT
Start: 2021-06-27 | End: 2021-06-30 | Stop reason: HOSPADM

## 2021-06-27 RX ORDER — BUTORPHANOL TARTRATE 1 MG/ML
1 INJECTION, SOLUTION INTRAMUSCULAR; INTRAVENOUS ONCE
Status: COMPLETED | OUTPATIENT
Start: 2021-06-27 | End: 2021-06-27

## 2021-06-27 RX ORDER — LABETALOL 20 MG/4 ML (5 MG/ML) INTRAVENOUS SYRINGE
20 ONCE
Status: COMPLETED | OUTPATIENT
Start: 2021-06-27 | End: 2021-06-27

## 2021-06-27 RX ORDER — ONDANSETRON 2 MG/ML
4 INJECTION INTRAMUSCULAR; INTRAVENOUS EVERY 4 HOURS PRN
Status: DISCONTINUED | OUTPATIENT
Start: 2021-06-27 | End: 2021-06-30 | Stop reason: HOSPADM

## 2021-06-27 RX ORDER — ROPIVACAINE HYDROCHLORIDE 2 MG/ML
INJECTION, SOLUTION EPIDURAL; INFILTRATION; PERINEURAL CONTINUOUS PRN
Status: DISCONTINUED | OUTPATIENT
Start: 2021-06-27 | End: 2021-06-28 | Stop reason: HOSPADM

## 2021-06-27 RX ORDER — VANCOMYCIN HYDROCHLORIDE 1 G/200ML
20 INJECTION, SOLUTION INTRAVENOUS EVERY 8 HOURS
Status: DISCONTINUED | OUTPATIENT
Start: 2021-06-27 | End: 2021-06-28

## 2021-06-27 RX ORDER — OXYTOCIN/RINGER'S LACTATE 30/500 ML
1-30 PLASTIC BAG, INJECTION (ML) INTRAVENOUS
Status: DISCONTINUED | OUTPATIENT
Start: 2021-06-27 | End: 2021-06-28

## 2021-06-27 RX ADMIN — VALACYCLOVIR HYDROCHLORIDE 500 MG: 500 TABLET, FILM COATED ORAL at 10:18

## 2021-06-27 RX ADMIN — SODIUM CHLORIDE 25 ML/HR: 0.9 INJECTION, SOLUTION INTRAVENOUS at 18:38

## 2021-06-27 RX ADMIN — ROPIVACAINE HYDROCHLORIDE 10 ML/HR: 2 INJECTION, SOLUTION EPIDURAL; INFILTRATION at 18:37

## 2021-06-27 RX ADMIN — MAGNESIUM SULFATE HEPTAHYDRATE 2 G/HR: 40 INJECTION, SOLUTION INTRAVENOUS at 20:36

## 2021-06-27 RX ADMIN — VANCOMYCIN HYDROCHLORIDE 1000 MG: 1 INJECTION, SOLUTION INTRAVENOUS at 00:43

## 2021-06-27 RX ADMIN — LABETALOL 20 MG/4 ML (5 MG/ML) INTRAVENOUS SYRINGE 20 MG: at 06:01

## 2021-06-27 RX ADMIN — NIFEDIPINE 30 MG: 30 TABLET, FILM COATED, EXTENDED RELEASE ORAL at 08:48

## 2021-06-27 RX ADMIN — VANCOMYCIN HYDROCHLORIDE 1000 MG: 1 INJECTION, SOLUTION INTRAVENOUS at 07:56

## 2021-06-27 RX ADMIN — ROPIVACAINE HYDROCHLORIDE: 2 INJECTION, SOLUTION EPIDURAL; INFILTRATION at 18:42

## 2021-06-27 RX ADMIN — ONDANSETRON 4 MG: 2 INJECTION INTRAMUSCULAR; INTRAVENOUS at 11:42

## 2021-06-27 RX ADMIN — BUTORPHANOL TARTRATE 1 MG: 1 INJECTION, SOLUTION INTRAMUSCULAR; INTRAVENOUS at 06:08

## 2021-06-27 RX ADMIN — ACETAMINOPHEN 650 MG: 325 TABLET, FILM COATED ORAL at 17:57

## 2021-06-27 RX ADMIN — MAGNESIUM SULFATE HEPTAHYDRATE 2 G: 40 INJECTION, SOLUTION INTRAVENOUS at 00:09

## 2021-06-27 RX ADMIN — Medication 2 MILLI-UNITS/MIN: at 11:47

## 2021-06-27 RX ADMIN — MAGNESIUM SULFATE HEPTAHYDRATE 2 G/HR: 40 INJECTION, SOLUTION INTRAVENOUS at 00:22

## 2021-06-27 RX ADMIN — VANCOMYCIN HYDROCHLORIDE 1000 MG: 1 INJECTION, SOLUTION INTRAVENOUS at 17:05

## 2021-06-27 RX ADMIN — MAGNESIUM SULFATE HEPTAHYDRATE 2 G/HR: 40 INJECTION, SOLUTION INTRAVENOUS at 10:29

## 2021-06-27 RX ADMIN — LIDOCAINE HYDROCHLORIDE AND EPINEPHRINE 5 ML: 15; 5 INJECTION, SOLUTION EPIDURAL at 18:30

## 2021-06-27 NOTE — OB LABOR/OXYTOCIN SAFETY PROGRESS
Labor Progress Note - Margoth Sullivan 35 y o  female MRN: [id number]    Unit/Bed#: L&D 326-01 Encounter: [id number]       Contraction Frequency (minutes): 0  Contraction Quality: Not applicable  Tachysystole: No   Cervical Dilation: 1        Cervical Effacement: 60  Fetal Station: -2  Baseline Rate: 145 bpm  Fetal Heart Rate: 140 BPM                  Vital Signs:   Vitals:    06/26/21 2246   BP: 147/87   Pulse: 60   Resp:    Temp:    SpO2:            Notes/comments:     Confirmed vertex on ultrasound  First dose of vaginal Cytotec placed  Cervix 1/60/-2, soft and mid-position  Will recheck in 3-4 hours and plan for Huber balloon placement at that time  FHT category I          Cathy Jacob MD 6/27/2021 12:02 AM

## 2021-06-27 NOTE — QUICK NOTE
Patient with persistently severe range blood pressures requiring treatment  Earlier today around 4:00 p m , she was given a dose of labetalol 20 mg IV and started on Procardia XL 30 mg daily  Her pressures briefly decreased and then began to trend upwards this evening with a maximum of 185/95  She was given another dose of IV labetalol 20 mg with return to a normal range of 130s to 150s over 70s to 80s  Patient evaluated at bedside, continues to be asymptomatic  Denies headache, visual changes, shortness of breath, RUQ/epigastric pain  Repeat labs are pending  Discussed her case with Dr Ted Connelly  Given that she has already received betamethasone with slowly uptrending LFTs and now persistent severe range blood pressures, recommendation is to move forward with delivery  Will restart magnesium and move her to a labor room  Repeat labs pending  Will re-scan for vertex position and plan for Cytotec and Huber balloon      Dr Gilbert jacques

## 2021-06-27 NOTE — ANESTHESIA PROCEDURE NOTES
Epidural Block    Patient location during procedure: OB  Start time: 6/27/2021 6:28 PM  Reason for block: procedure for pain and at surgeon's request  Staffing  Performed: anesthesiologist   Anesthesiologist: Venessa Narvaez DO  Preanesthetic Checklist  Completed: patient identified, IV checked, site marked, risks and benefits discussed, surgical consent, monitors and equipment checked, pre-op evaluation and timeout performed  Epidural  Patient position: sitting  Prep: ChloraPrep  Patient monitoring: cardiac monitor and frequent blood pressure checks  Approach: midline  Injection technique: DARLENE air  Needle  Needle type: Tuohy   Needle gauge: 18 G  Catheter type: side hole  Catheter size: 20 G  Catheter securement method: surgical tape  Test dose: negativelidocaine 1 5% with epinephrine 1:200,000 test dose, 5 mL  Assessment  Sensory level: T10  Number of attempts: 1negative aspiration for CSF, negative aspiration for heme and no paresthesia on injection  patient tolerated the procedure well with no immediate complications

## 2021-06-27 NOTE — OB LABOR/OXYTOCIN SAFETY PROGRESS
Oxytocin Safety Progress Check Note - Irene Canchola 35 y o  female MRN: [id number]    Unit/Bed#: L&D 326-01 Encounter: [id number]    Dose (jude-units/min) Oxytocin: 14 jude-units/min  Contraction Frequency (minutes): 2-3  Contraction Quality: Moderate  Tachysystole: No   Cervical Dilation: 5        Cervical Effacement: 70  Fetal Station: -2  Baseline Rate: 125 bpm  Fetal Heart Rate: 140 BPM  FHR Category: Category I               Vital Signs:   Vitals:    06/27/21 1718   BP:    Pulse: 78   Resp:    Temp:    SpO2: 95%           Notes/comments: Patient unchanged from previous exam  Planning for epidural and then AROM  BP have been non-severe  PM labs drawn and pending  FHT category 1  Will continue to monitor  Dr Jayce Grissom aware  Addendum: head ballotable on examination  Not able to be ruptured at this time  Will continue to monitor       Cm Carrizales MD 6/27/2021 6:00 PM

## 2021-06-27 NOTE — OB LABOR/OXYTOCIN SAFETY PROGRESS
Oxytocin Safety Progress Check Note - Media [place] 35 y o  female MRN: [id number]    Unit/Bed#: L&D 326-01 Encounter: [id number]    Dose (jude-units/min) Oxytocin: 14 jude-units/min  Contraction Frequency (minutes): 2-3  Contraction Quality: Moderate  Tachysystole: No   Cervical Dilation: 5        Cervical Effacement: 70  Fetal Station: -2  Baseline Rate: 125 bpm  Fetal Heart Rate: 140 BPM  FHR Category: Category I               Vital Signs:   Vitals:    06/27/21 1558   BP:    Pulse: 77   Resp:    Temp:    SpO2: 94%           Notes/comments:   Vertex on US  Head well applied  Offered AROM, patient requests more time and considering epidural prior to AROM  Will recheck in 2 hours, if unchanged or not SROM, will offer epidural and AROM       Garry Good MD 6/27/2021 4:08 PM

## 2021-06-27 NOTE — OB LABOR/OXYTOCIN SAFETY PROGRESS
Labor Progress Note - Doris Denton 35 y o  female MRN: [id number]    Unit/Bed#: L&D 326-01 Encounter: [id number]       Contraction Frequency (minutes): 7  Contraction Quality: Moderate  Tachysystole: No   Cervical Dilation: 5        Cervical Effacement: 70  Fetal Station: -2  Baseline Rate: 115 bpm  Fetal Heart Rate: 140 BPM  FHR Category: Category I               Vital Signs:   Vitals:    06/27/21 1113   BP:    Pulse: 87   Resp:    Temp:    SpO2: 96%           Notes/comments:    Huber balloon out, SVE as above  Category I FHT  Contractions spaced out to every 7 minutes  Will start pitocin titration  Discussed with Dr Esther Anne MD 6/27/2021 11:33 AM

## 2021-06-27 NOTE — PROGRESS NOTES
Obstetrics Labor Progress Note    Assessment and Plan: Cat Mech 35 y o  Kajal Miller at 33w0d for labor induction  Labor induction:  - FHT: 120s Cat 1  - Labor induction: cervical ripening: polk/cytotec  - Labor analgesia: plans for epidural, Stadol at 6 am    Pregnancy Risks:  - obesity  - GBS bacteruria  - preeclampsia with severe features    Pregnancy Problems (from 11/08/20 to present)     Problem Noted Resolved    Severe pre-eclampsia in third trimester 6/24/2021 by Cori Ellsworth MD No    Epigastric abdominal pain affecting pregnancy in third trimester 6/24/2021 by Estelle Arrington MD No          Subjective:   Patient without complaints  Objective:  /83   Pulse 87   Temp 97 7 °F (36 5 °C) (Oral)   Resp 18   Ht 5' (1 524 m)   Wt 71 7 kg (158 lb)   LMP 11/08/2020 (Exact Date)   SpO2 94%   BMI 30 86 kg/m²     Fetal Heart Tracing:  Baseline Rate: 125 bpm  Variability: moderate  Accelerations: present  Decelerations: none  FHR Category: Category 1    Cervical Exam:  Cervical Dilation: 1  Cervical Effacement: 70  Fetal Station: -2    Oxytocin Administration: none     Contraction Frequency (minutes): 2-6  Contraction Quality: Moderate  Tachysystole: No       Last dose of cytotec 23:54  Last dose of stadol 6:05      Plan is oxytocin titration once contractions space out/polk balloon dislodged  Vertex on US  Discussed plans for induction, to avoid long induction process if BPs are uncontrolled, higher rate of failed induction <35 weeks, patient wishes to continue induction process        Lottie Manley MD    [address]  06/27/21 8:43 AM

## 2021-06-27 NOTE — ANESTHESIA PREPROCEDURE EVALUATION
Procedure:  LABOR ANALGESIA    Relevant Problems   CARDIO   (+) Pre-eclampsia, severe, third trimester   (+) Severe pre-eclampsia in third trimester      GYN   (+) 32 weeks gestation of pregnancy   (+) Encounter for supervision of normal first pregnancy in second trimester      MUSCULOSKELETAL   (+) Left hamstring muscle strain        Physical Exam    Airway    Mallampati score: II  TM Distance: >3 FB  Neck ROM: full     Dental       Cardiovascular  Rhythm: regular, Rate: normal, Cardiovascular exam normal    Pulmonary  Pulmonary exam normal Breath sounds clear to auscultation,     Other Findings        Anesthesia Plan  ASA Score- 3     Anesthesia Type- epidural with ASA Monitors  Additional Monitors:   Airway Plan:           Plan Factors-Exercise tolerance (METS): >4 METS  Chart reviewed  Existing labs reviewed  Patient is not a current smoker  Obstructive sleep apnea risk education given perioperatively  Induction- intravenous  Postoperative Plan-     Informed Consent- Anesthetic plan and risks discussed with patient

## 2021-06-27 NOTE — OB LABOR/OXYTOCIN SAFETY PROGRESS
Labor Progress Note - Abdulkadir Archibald 35 y o  female MRN: [id number]    Unit/Bed#: L&D 326-01 Encounter: [id number]       Contraction Frequency (minutes): 2-6  Contraction Quality: Mild  Tachysystole: No   Cervical Dilation: 1        Cervical Effacement: 70  Fetal Station: -2  Baseline Rate: 130 bpm  Fetal Heart Rate: 140 BPM                  Vital Signs:   Vitals:    06/27/21 0401   BP: 144/96   Pulse: 85   Resp:    Temp:    SpO2:            Notes/comments:      Cervix slightly more effaced, 1/70/-2  FHT with a run of late decelerations, now resolved with repositioning  PROCEDURE:  POLK BALLOON PLACEMENT    A 24F polk with a 30cc balloon was selected, SVE was performed and cervix was located, polk was introduced over sterile gloved hands  Balloon advanced through cervix beyond the internal cervical os  A small amount amount of sterile saline solution was instilled in the balloon to confirm placement  Placement was confirmed to be beyond the internal cervical os  A total of 60cc of sterile saline solution was placed into the balloon  Pt tolerated well  Instructions left with RN to place polk to gravity with a 1L bag of IV fluid  Notify MD when polk dislodged      MD Heidi Weinberg MD 6/27/2021 4:09 AM

## 2021-06-27 NOTE — OB LABOR/OXYTOCIN SAFETY PROGRESS
Oxytocin Safety Progress Check Note - Doris Denton 35 y o  female MRN: [id number]    Unit/Bed#: L&D 326-01 Encounter: [id number]    Dose (jude-units/min) Oxytocin: 6 jude-units/min  Contraction Frequency (minutes): 3  Contraction Quality: Mild  Tachysystole: No   Cervical Dilation: 5        Cervical Effacement: 70  Fetal Station: -2  Baseline Rate: 120 bpm  Fetal Heart Rate: 140 BPM  FHR Category: Category I               Vital Signs:   Vitals:    06/27/21 1405   BP:    Pulse: 77   Resp:    Temp:    SpO2: 95%           Notes/comments:    Exam deferred at this time  Contractions becoming more regular  Category I FHT  Continue pitocin titration      Anne-Marie Anne MD 6/27/2021 2:08 PM

## 2021-06-27 NOTE — NURSING NOTE
Dr Dorian Booker and NICU both notified that patient will start being induced related to blood pressure issues

## 2021-06-28 DIAGNOSIS — O99.810 ABNORMAL GLUCOSE AFFECTING PREGNANCY: ICD-10-CM

## 2021-06-28 DIAGNOSIS — Z3A.30 30 WEEKS GESTATION OF PREGNANCY: ICD-10-CM

## 2021-06-28 PROBLEM — Z3A.33 33 WEEKS GESTATION OF PREGNANCY: Status: ACTIVE | Noted: 2021-03-03

## 2021-06-28 LAB
ALBUMIN SERPL BCP-MCNC: 1.9 G/DL (ref 3.5–5)
ALBUMIN SERPL BCP-MCNC: 1.9 G/DL (ref 3.5–5)
ALBUMIN SERPL BCP-MCNC: 2.2 G/DL (ref 3.5–5)
ALBUMIN SERPL BCP-MCNC: 2.2 G/DL (ref 3.5–5)
ALP SERPL-CCNC: 185 U/L (ref 46–116)
ALP SERPL-CCNC: 192 U/L (ref 46–116)
ALP SERPL-CCNC: 207 U/L (ref 46–116)
ALP SERPL-CCNC: 213 U/L (ref 46–116)
ALT SERPL W P-5'-P-CCNC: 151 U/L (ref 12–78)
ALT SERPL W P-5'-P-CCNC: 153 U/L (ref 12–78)
ALT SERPL W P-5'-P-CCNC: 162 U/L (ref 12–78)
ALT SERPL W P-5'-P-CCNC: 165 U/L (ref 12–78)
ANION GAP SERPL CALCULATED.3IONS-SCNC: 11 MMOL/L (ref 4–13)
ANION GAP SERPL CALCULATED.3IONS-SCNC: 6 MMOL/L (ref 4–13)
ANION GAP SERPL CALCULATED.3IONS-SCNC: 9 MMOL/L (ref 4–13)
ANION GAP SERPL CALCULATED.3IONS-SCNC: 9 MMOL/L (ref 4–13)
AST SERPL W P-5'-P-CCNC: 56 U/L (ref 5–45)
AST SERPL W P-5'-P-CCNC: 56 U/L (ref 5–45)
AST SERPL W P-5'-P-CCNC: 63 U/L (ref 5–45)
AST SERPL W P-5'-P-CCNC: 64 U/L (ref 5–45)
BASE EXCESS BLDCOA CALC-SCNC: -4.4 MMOL/L (ref 3–11)
BASE EXCESS BLDCOV CALC-SCNC: -2.6 MMOL/L (ref 1–9)
BILIRUB SERPL-MCNC: 0.18 MG/DL (ref 0.2–1)
BILIRUB SERPL-MCNC: 0.19 MG/DL (ref 0.2–1)
BILIRUB SERPL-MCNC: 0.25 MG/DL (ref 0.2–1)
BILIRUB SERPL-MCNC: 0.26 MG/DL (ref 0.2–1)
BUN SERPL-MCNC: 10 MG/DL (ref 5–25)
BUN SERPL-MCNC: 11 MG/DL (ref 5–25)
BUN SERPL-MCNC: 8 MG/DL (ref 5–25)
BUN SERPL-MCNC: 9 MG/DL (ref 5–25)
CALCIUM ALBUM COR SERPL-MCNC: 8.5 MG/DL (ref 8.3–10.1)
CALCIUM ALBUM COR SERPL-MCNC: 8.6 MG/DL (ref 8.3–10.1)
CALCIUM ALBUM COR SERPL-MCNC: 8.6 MG/DL (ref 8.3–10.1)
CALCIUM ALBUM COR SERPL-MCNC: 8.7 MG/DL (ref 8.3–10.1)
CALCIUM SERPL-MCNC: 6.8 MG/DL (ref 8.3–10.1)
CALCIUM SERPL-MCNC: 6.9 MG/DL (ref 8.3–10.1)
CALCIUM SERPL-MCNC: 7.2 MG/DL (ref 8.3–10.1)
CALCIUM SERPL-MCNC: 7.3 MG/DL (ref 8.3–10.1)
CHLORIDE SERPL-SCNC: 102 MMOL/L (ref 100–108)
CHLORIDE SERPL-SCNC: 102 MMOL/L (ref 100–108)
CHLORIDE SERPL-SCNC: 103 MMOL/L (ref 100–108)
CHLORIDE SERPL-SCNC: 104 MMOL/L (ref 100–108)
CO2 SERPL-SCNC: 22 MMOL/L (ref 21–32)
CO2 SERPL-SCNC: 23 MMOL/L (ref 21–32)
CO2 SERPL-SCNC: 24 MMOL/L (ref 21–32)
CO2 SERPL-SCNC: 26 MMOL/L (ref 21–32)
CREAT SERPL-MCNC: 0.61 MG/DL (ref 0.6–1.3)
CREAT SERPL-MCNC: 0.62 MG/DL (ref 0.6–1.3)
CREAT SERPL-MCNC: 0.64 MG/DL (ref 0.6–1.3)
CREAT SERPL-MCNC: 0.66 MG/DL (ref 0.6–1.3)
ERYTHROCYTE [DISTWIDTH] IN BLOOD BY AUTOMATED COUNT: 13.3 % (ref 11.6–15.1)
ERYTHROCYTE [DISTWIDTH] IN BLOOD BY AUTOMATED COUNT: 13.4 % (ref 11.6–15.1)
ERYTHROCYTE [DISTWIDTH] IN BLOOD BY AUTOMATED COUNT: 13.5 % (ref 11.6–15.1)
GFR SERPL CREATININE-BSD FRML MDRD: 120 ML/MIN/1.73SQ M
GFR SERPL CREATININE-BSD FRML MDRD: 121 ML/MIN/1.73SQ M
GFR SERPL CREATININE-BSD FRML MDRD: 122 ML/MIN/1.73SQ M
GFR SERPL CREATININE-BSD FRML MDRD: 123 ML/MIN/1.73SQ M
GLUCOSE SERPL-MCNC: 108 MG/DL (ref 65–140)
GLUCOSE SERPL-MCNC: 111 MG/DL (ref 65–140)
GLUCOSE SERPL-MCNC: 84 MG/DL (ref 65–140)
GLUCOSE SERPL-MCNC: 85 MG/DL (ref 65–140)
HCO3 BLDCOA-SCNC: 22.3 MMOL/L (ref 17.3–27.3)
HCO3 BLDCOV-SCNC: 21.3 MMOL/L (ref 12.2–28.6)
HCT VFR BLD AUTO: 27 % (ref 34.8–46.1)
HCT VFR BLD AUTO: 27.9 % (ref 34.8–46.1)
HCT VFR BLD AUTO: 28.7 % (ref 34.8–46.1)
HCT VFR BLD AUTO: 31.6 % (ref 34.8–46.1)
HCT VFR BLD AUTO: 31.9 % (ref 34.8–46.1)
HGB BLD-MCNC: 10.4 G/DL (ref 11.5–15.4)
HGB BLD-MCNC: 10.4 G/DL (ref 11.5–15.4)
HGB BLD-MCNC: 8.7 G/DL (ref 11.5–15.4)
HGB BLD-MCNC: 9.3 G/DL (ref 11.5–15.4)
HGB BLD-MCNC: 9.4 G/DL (ref 11.5–15.4)
MCH RBC QN AUTO: 27 PG (ref 26.8–34.3)
MCH RBC QN AUTO: 27.3 PG (ref 26.8–34.3)
MCH RBC QN AUTO: 27.4 PG (ref 26.8–34.3)
MCH RBC QN AUTO: 27.6 PG (ref 26.8–34.3)
MCH RBC QN AUTO: 27.8 PG (ref 26.8–34.3)
MCHC RBC AUTO-ENTMCNC: 32.2 G/DL (ref 31.4–37.4)
MCHC RBC AUTO-ENTMCNC: 32.6 G/DL (ref 31.4–37.4)
MCHC RBC AUTO-ENTMCNC: 32.8 G/DL (ref 31.4–37.4)
MCHC RBC AUTO-ENTMCNC: 32.9 G/DL (ref 31.4–37.4)
MCHC RBC AUTO-ENTMCNC: 33.3 G/DL (ref 31.4–37.4)
MCV RBC AUTO: 83 FL (ref 82–98)
MCV RBC AUTO: 84 FL (ref 82–98)
O2 CT VFR BLDCOA CALC: 17 ML/DL
OXYHGB MFR BLDCOA: 65.1 %
OXYHGB MFR BLDCOV: 57.3 %
PCO2 BLDCOA: 46.2 MM[HG] (ref 30–60)
PCO2 BLDCOV: 35 MM HG (ref 27–43)
PH BLDCOA: 7.3 [PH] (ref 7.23–7.43)
PH BLDCOV: 7.4 [PH] (ref 7.19–7.49)
PLATELET # BLD AUTO: 228 THOUSANDS/UL (ref 149–390)
PLATELET # BLD AUTO: 228 THOUSANDS/UL (ref 149–390)
PLATELET # BLD AUTO: 234 THOUSANDS/UL (ref 149–390)
PLATELET # BLD AUTO: 266 THOUSANDS/UL (ref 149–390)
PLATELET # BLD AUTO: 267 THOUSANDS/UL (ref 149–390)
PMV BLD AUTO: 11.5 FL (ref 8.9–12.7)
PMV BLD AUTO: 11.9 FL (ref 8.9–12.7)
PMV BLD AUTO: 11.9 FL (ref 8.9–12.7)
PMV BLD AUTO: 12.2 FL (ref 8.9–12.7)
PMV BLD AUTO: 12.3 FL (ref 8.9–12.7)
PO2 BLDCOA: 29.1 MM HG (ref 5–25)
PO2 BLDCOV: 23 MM HG (ref 15–45)
POTASSIUM SERPL-SCNC: 3.3 MMOL/L (ref 3.5–5.3)
POTASSIUM SERPL-SCNC: 3.4 MMOL/L (ref 3.5–5.3)
POTASSIUM SERPL-SCNC: 3.8 MMOL/L (ref 3.5–5.3)
POTASSIUM SERPL-SCNC: 4.1 MMOL/L (ref 3.5–5.3)
PROT SERPL-MCNC: 6.1 G/DL (ref 6.4–8.2)
PROT SERPL-MCNC: 6.1 G/DL (ref 6.4–8.2)
PROT SERPL-MCNC: 6.8 G/DL (ref 6.4–8.2)
PROT SERPL-MCNC: 6.9 G/DL (ref 6.4–8.2)
RBC # BLD AUTO: 3.22 MILLION/UL (ref 3.81–5.12)
RBC # BLD AUTO: 3.34 MILLION/UL (ref 3.81–5.12)
RBC # BLD AUTO: 3.44 MILLION/UL (ref 3.81–5.12)
RBC # BLD AUTO: 3.77 MILLION/UL (ref 3.81–5.12)
RBC # BLD AUTO: 3.8 MILLION/UL (ref 3.81–5.12)
SAO2 % BLDCOV: 14.6 ML/DL
SODIUM SERPL-SCNC: 134 MMOL/L (ref 136–145)
SODIUM SERPL-SCNC: 135 MMOL/L (ref 136–145)
SODIUM SERPL-SCNC: 136 MMOL/L (ref 136–145)
SODIUM SERPL-SCNC: 136 MMOL/L (ref 136–145)
WBC # BLD AUTO: 14.98 THOUSAND/UL (ref 4.31–10.16)
WBC # BLD AUTO: 15.81 THOUSAND/UL (ref 4.31–10.16)
WBC # BLD AUTO: 17.67 THOUSAND/UL (ref 4.31–10.16)
WBC # BLD AUTO: 17.91 THOUSAND/UL (ref 4.31–10.16)
WBC # BLD AUTO: 19.83 THOUSAND/UL (ref 4.31–10.16)

## 2021-06-28 PROCEDURE — 85027 COMPLETE CBC AUTOMATED: CPT | Performed by: OBSTETRICS & GYNECOLOGY

## 2021-06-28 PROCEDURE — 59400 OBSTETRICAL CARE: CPT | Performed by: OBSTETRICS & GYNECOLOGY

## 2021-06-28 PROCEDURE — 80053 COMPREHEN METABOLIC PANEL: CPT | Performed by: OBSTETRICS & GYNECOLOGY

## 2021-06-28 PROCEDURE — 88307 TISSUE EXAM BY PATHOLOGIST: CPT | Performed by: PATHOLOGY

## 2021-06-28 PROCEDURE — 82805 BLOOD GASES W/O2 SATURATION: CPT | Performed by: OBSTETRICS & GYNECOLOGY

## 2021-06-28 RX ORDER — SIMETHICONE 80 MG
80 TABLET,CHEWABLE ORAL 4 TIMES DAILY PRN
Status: DISCONTINUED | OUTPATIENT
Start: 2021-06-28 | End: 2021-06-30 | Stop reason: HOSPADM

## 2021-06-28 RX ORDER — MAGNESIUM HYDROXIDE/ALUMINUM HYDROXICE/SIMETHICONE 120; 1200; 1200 MG/30ML; MG/30ML; MG/30ML
15 SUSPENSION ORAL EVERY 6 HOURS PRN
Status: DISCONTINUED | OUTPATIENT
Start: 2021-06-28 | End: 2021-06-30 | Stop reason: HOSPADM

## 2021-06-28 RX ORDER — BLOOD SUGAR DIAGNOSTIC
STRIP MISCELLANEOUS
Qty: 100 STRIP | Refills: 0 | Status: SHIPPED | OUTPATIENT
Start: 2021-06-28 | End: 2021-06-30 | Stop reason: HOSPADM

## 2021-06-28 RX ORDER — DIAPER,BRIEF,INFANT-TODD,DISP
1 EACH MISCELLANEOUS 4 TIMES DAILY PRN
Status: DISCONTINUED | OUTPATIENT
Start: 2021-06-28 | End: 2021-06-30 | Stop reason: HOSPADM

## 2021-06-28 RX ORDER — IBUPROFEN 600 MG/1
600 TABLET ORAL EVERY 6 HOURS PRN
Status: DISCONTINUED | OUTPATIENT
Start: 2021-06-28 | End: 2021-06-30 | Stop reason: HOSPADM

## 2021-06-28 RX ORDER — SENNOSIDES 8.6 MG
1 TABLET ORAL DAILY
Status: DISCONTINUED | OUTPATIENT
Start: 2021-06-28 | End: 2021-06-30 | Stop reason: HOSPADM

## 2021-06-28 RX ORDER — DOCUSATE SODIUM 100 MG/1
100 CAPSULE, LIQUID FILLED ORAL 2 TIMES DAILY
Status: DISCONTINUED | OUTPATIENT
Start: 2021-06-28 | End: 2021-06-30 | Stop reason: HOSPADM

## 2021-06-28 RX ORDER — OXYCODONE HYDROCHLORIDE 5 MG/1
5 TABLET ORAL EVERY 4 HOURS PRN
Status: DISCONTINUED | OUTPATIENT
Start: 2021-06-28 | End: 2021-06-30 | Stop reason: HOSPADM

## 2021-06-28 RX ORDER — CALCIUM CARBONATE 200(500)MG
1000 TABLET,CHEWABLE ORAL DAILY PRN
Status: DISCONTINUED | OUTPATIENT
Start: 2021-06-28 | End: 2021-06-30 | Stop reason: HOSPADM

## 2021-06-28 RX ADMIN — MAGNESIUM SULFATE HEPTAHYDRATE 2 G/HR: 40 INJECTION, SOLUTION INTRAVENOUS at 05:48

## 2021-06-28 RX ADMIN — ROPIVACAINE HYDROCHLORIDE: 2 INJECTION, SOLUTION EPIDURAL; INFILTRATION at 03:55

## 2021-06-28 RX ADMIN — WITCH HAZEL 1 PAD: 500 SOLUTION RECTAL; TOPICAL at 12:38

## 2021-06-28 RX ADMIN — NIFEDIPINE 30 MG: 30 TABLET, FILM COATED, EXTENDED RELEASE ORAL at 09:17

## 2021-06-28 RX ADMIN — SENNOSIDES 8.6 MG: 8.6 TABLET ORAL at 09:57

## 2021-06-28 RX ADMIN — DOCUSATE SODIUM 100 MG: 100 CAPSULE ORAL at 09:57

## 2021-06-28 RX ADMIN — BENZOCAINE AND LEVOMENTHOL: 200; 5 SPRAY TOPICAL at 12:38

## 2021-06-28 RX ADMIN — VALACYCLOVIR HYDROCHLORIDE 500 MG: 500 TABLET, FILM COATED ORAL at 09:57

## 2021-06-28 RX ADMIN — ONDANSETRON 4 MG: 2 INJECTION INTRAMUSCULAR; INTRAVENOUS at 08:29

## 2021-06-28 RX ADMIN — VANCOMYCIN HYDROCHLORIDE 1000 MG: 1 INJECTION, SOLUTION INTRAVENOUS at 01:01

## 2021-06-28 RX ADMIN — MAGNESIUM SULFATE HEPTAHYDRATE 2 G/HR: 40 INJECTION, SOLUTION INTRAVENOUS at 15:50

## 2021-06-28 RX ADMIN — DOCUSATE SODIUM 100 MG: 100 CAPSULE ORAL at 17:25

## 2021-06-28 NOTE — OB LABOR/OXYTOCIN SAFETY PROGRESS
Oxytocin Safety Progress Check Note - Irene Canchola 35 y o  female MRN: [id number]    Unit/Bed#: L&D 326-01 Encounter: [id number]    Dose (jude-units/min) Oxytocin: 16 jude-units/min  Contraction Frequency (minutes): 2  Contraction Quality: Moderate  Tachysystole: No   Cervical Dilation: 5        Cervical Effacement: 80  Fetal Station: -1  Baseline Rate: 120 bpm  Fetal Heart Rate: 140 BPM  FHR Category: Category I               Vital Signs:   Vitals:    06/28/21 0006   BP: 124/75   Pulse: 82   Resp:    Temp:    SpO2:            Notes/comments: SVE as above  Unchanged from previous exams  On exam, baby continues to be ballotable  Bag is taut and unable to full appreciate fetal head for amniotomy  Continue with frequent position changes  BP continue to be non-severe  Labs AST/ALT 63/165  Cr 0 66 from 0 61  Patient voiding spontaneously on the bed pain q2h with adequate UOP  CBC stable  Will continue to monitor patient's blood pressures closely  Dr Jayce Grissom aware          Cm Carrizales MD 6/28/2021 12:58 AM

## 2021-06-28 NOTE — OB LABOR/OXYTOCIN SAFETY PROGRESS
Oxytocin Safety Progress Check Note - Abdulkadir Archibald 35 y o  female MRN: [id number]    Unit/Bed#: L&D 326-01 Encounter: [id number]    Dose (jude-units/min) Oxytocin: 20 jude-units/min  Contraction Frequency (minutes): 2-4  Contraction Quality: Moderate  Tachysystole: No   Cervical Dilation: 10  Dilation Complete Date: 06/28/21  Dilation Complete Time: 0728  Cervical Effacement: 100  Fetal Station: 3  Baseline Rate: 120 bpm  Fetal Heart Rate: 120 BPM  FHR Category: Category I               Vital Signs:   Vitals:    06/28/21 0721   BP: 140/88   Pulse: 81   Resp:    Temp:    SpO2:        Notes/comments:     SVE as above  Plan to start pushing shortly    Nolberto Barrios MD 6/28/2021 7:32 AM

## 2021-06-28 NOTE — UTILIZATION REVIEW
Continued Stay Review  Date: 6/28/2021                        Current Patient Class: inpt    Current Level of Care: med surg   HPI:30 y o  female initially admitted inpatient  for severe preeclampsia @ 32 4/7 wks  c/o abdominal pain in epigastric area, nausea (+) edema  BP on admission 188/104, followed by 174/102 and 166/102, patient received management with 20 mg of IV labetalol   Subsequently evidence of blood pressure of 170/102 patient receive 40 mg of labetalol  IV MGSO4 started BTM 12 mg x doses q 12 hrs   Plan monitor lab BP's continuous external monitoring IV antihypertensive medications as needed and supportive care w delivery as clinically necessary    Assessment/Plan:   6/25  32w5d ;  Daily labs  Reviewed with patient will deliver at 34 weeks or earlier if medically indicated  S/P magnesium sulfate and s/p BMZ  Will continue NST BID , Will continue valtrex for HSV suppression  Reports she saw two drops of blood on her underwear yesterday but nothing today  denies h/a, scotomata, ruq pain  Systolic (86VUM), YUK:619 , Min:111 , JGR:223   Diastolic (14DCI), PUK:49, Min:71, Max:100Asymptomatic at this time  Last SRBP 0300 6/24/21, last treated with 40mg IV labetalol 0100 6/24/21 6/26 32w6d admitted with preeclampsia with severe features based on severe range blood pressures, epigastric pain that is now resolved, and P/c 4 2   hospital day 2  Preeclampsia w/ SF  - Bps this afternoon with two severe range blood pressures, sp labetalol 20 and started on Procardia XL 30 mg   - S/p 24 hours of magnesium; plan to restart magnesium when decision is made for delivery  - continues to be asymptomatic  - BTM 6/24-6/25  - Labs Q24H: AST/ALT 52/97--> 36/59--> 37/54, CBC WNL; will continue to trend daily labs  - NST BID- Plan for delivery at 34 weeks or sooner if clinically indicated  PM UPDATE: persistently severe range blood pressures requiring treatment  BPs  trend upwards this evening with a maximum of 185/95  She was given another dose of IV labetalol 20 mg with return to a normal range of 130s to 150s over 70s   Per [facility] recommendation: betamethasone with slowly uptrending LFTs and now persistent severe range blood pressures, recommendation is to move forward with delivery w IOL       IOL  Start 2021 12:02 am exam Cervix 1/60/-2, received cytotec, a polk balloon and was started IV pitocin titration     AROM'ed for clear at 22 [id number] , delivered viable baby boy 737  DELIVERY NOTE 2021  Procedure: Spontaneous vaginal delivery   Patient then delivered a viable male  at 80  with APGARS 8, 9 at 1 and 5 minutes    Vital Signs:   Date/Time  Temp  Pulse  Resp  BP  SpO2  O2 Device  Cardiac (WDL)  Patient Position - Orthostatic VS   21 1456  97 9 °F (36 6 °C)  91  16  142/93  --  --  --  Lying   21 1315  --  84  16  143/94  97 %  None (Room air)  WDL  Lying   21 1151  --  81  --  140/91  --  --  --  --   21 1136  --  87  --  137/96  --  --  --  --   21 1121  --  86  --  140/98  --  --  --  --   21 1120  --  --  --  --  --  None (Room air)  --  --         Pertinent Labs/Diagnostic Results:       Results from last 7 days   Lab Units 21  1152 21  0539 21  2356 21  1754 21  1211 21  2357   WBC Thousand/uL 17 67* 15 81* 14 98* 15 08* 12 41* 10 88*   HEMOGLOBIN g/dL 9 4* 10 4* 10 4* 10 8* 10 3* 10 6*   HEMATOCRIT % 28 7* 31 9* 31 6* 33 2* 31 6* 31 7*   PLATELETS Thousands/uL 234 266 267 275 253 251   NEUTROS ABS Thousands/µL  --   --   --   --   --  6 81         Results from last 7 days   Lab Units 21  1152 21  0539 21  2356 21  1754 21  1211   SODIUM mmol/L 136 135* 134* 132* 135*   POTASSIUM mmol/L 3 4* 3 8 4 1 3 7 3 6   CHLORIDE mmol/L 103 102 102 101 102   CO2 mmol/L 24 22 26 22 22   ANION GAP mmol/L 9 11 6 9 11   BUN mg/dL 10 9 11 9 10   CREATININE mg/dL 0 61 0 64 0 66 0 61 0 48*   EGFR ml/min/1 73sq m 123 121 120 123 133   CALCIUM mg/dL 6 8* 7 2* 7 3* 7 5* 7 6*     Results from last 7 days   Lab Units 06/28/21  1152 06/28/21  0539 06/27/21  2356 06/27/21  1754 06/27/21  1211   AST U/L 56* 64* 63* 71* 75*   ALT U/L 151* 162* 165* 180* 176*   ALK PHOS U/L 185* 213* 207* 218* 199*   TOTAL PROTEIN g/dL 6 1* 6 8 6 9 7 1 6 8   ALBUMIN g/dL 1 9* 2 2* 2 2* 2 4* 2 3*   TOTAL BILIRUBIN mg/dL 0 25 0 26 0 19* 0 22 0 11*     Results from last 7 days   Lab Units 06/26/21  2129 06/26/21  1758 06/26/21  1521 06/26/21  1139 06/26/21  0636 06/25/21  2101 06/25/21  1607 06/25/21  1029 06/25/21  0606 06/24/21  1857 06/24/21  1642 06/24/21  1514   POC GLUCOSE mg/dl 74 95 74 65 91 172* 77 225* 136 119 115 118     Results from last 7 days   Lab Units 06/28/21  1152 06/28/21  0539 06/27/21  2356 06/27/21  1754 06/27/21  1211 06/27/21  0552 06/26/21  2302 06/26/21  0554 06/25/21  0612 06/24/21  0606 06/23/21  2357   GLUCOSE RANDOM mg/dL 108 84 85 105 85 86 75 95 121 109 81       Results from last 7 days   Lab Units 06/23/21  2357   CLARITY UA  Slightly Cloudy   COLOR UA  Yellow   SPEC GRAV UA  1 010   PH UA  6 5   GLUCOSE UA mg/dl Negative   KETONES UA mg/dl Negative   BLOOD UA  Small*   PROTEIN UA mg/dl 100 (2+)*   NITRITE UA  Negative   BILIRUBIN UA  Negative   UROBILINOGEN UA E U /dl 0 2   LEUKOCYTES UA  Large*   WBC UA /hpf Innumerable*   RBC UA /hpf 2-4   BACTERIA UA /hpf Occasional   EPITHELIAL CELLS WET PREP /hpf Occasional   CREATININE UR mg/dL 30 3   PROTEIN UR mg/dL 128   PROT/CREAT RATIO UR  4 22*           Results from last 7 days   Lab Units 06/23/21  9685   URINE CULTURE  <10,000 cfu/ml Beta Hemolytic Streptococcus Group B*  <10,000 cfu/ml Candida sp  presumptively albicans*  80,000-89,000 cfu/ml    Medications:   Scheduled Medications:  docusate sodium, 100 mg, Oral, BID  measles-mumps-rubella, 0 5 mL, Subcutaneous, Once  NIFEdipine, 30 mg, Oral, Daily  senna, 1 tablet, Oral, Daily  valACYclovir, 500 mg, Oral, Daily    Continuous IV Infusions:  magnesium sulfate, 2 g/hr, Intravenous, Continuous  sodium chloride, 25 mL/hr, Intravenous, Continuous  IV  oxytocin (PITOCIN) 30 Units in lactated ringers 500 mL infusion 6/27 1147 & DC 6/28 0806  Rate: 1-30 mL/hr Dose: 1-30 jude-units/min  Freq: Titrated Route: IV  Last Dose: Stopped (06/28/21 1228    PRN Meds:  acetaminophen, 650 mg, Oral, Q6H PRN  aluminum-magnesium hydroxide-simethicone, 15 mL, Oral, Q6H PRN  benzocaine-menthol-lanolin-aloe, , Topical, 4x Daily PRN  calcium carbonate, 1,000 mg, Oral, Daily PRN  calcium gluconate, 1 g, Intravenous, Once PRN  hydrocortisone, 1 application, Topical, 4x Daily PRN  ibuprofen, 600 mg, Oral, Q6H PRN  ondansetron, 4 mg, Intravenous, Q4H PRN  oxyCODONE, 5 mg, Oral, Q4H PRN  simethicone, 80 mg, Oral, 4x Daily PRN  witch hazel-glycerin, 1 pad, Topical, Q2H PRN        Discharge Plan: home when medically stable   Network Utilization Review Department  ATTENTION: Please call with any questions or concerns to [phone] and carefully listen to the prompts so that you are directed to the right person  All voicemails are confidential   Amber Bradford all requests for admission clinical reviews, approved or denied determinations and any other requests to dedicated fax number below belonging to the campus where the patient is receiving treatment   List of dedicated fax numbers for the Facilities:  [address] DENIALS (Administrative/Medical Necessity) [phone]   [address] [place] [address] (Maternity/NICU/Pediatrics) [phone]   5 [facility] 40 [address] [facility] Melinda Love 7947 [id number] [address] 468 [facility] [phone]   Zackery Alonzo 37 [address] [place] [address] [phone]

## 2021-06-28 NOTE — PROGRESS NOTES
06/28/21 1200   Clinical Encounter Type   Visited With Patient and family together   Routine Visit Introduction   Jewish Encounters   Jewish Needs Prayer

## 2021-06-28 NOTE — DISCHARGE SUMMARY
Discharge Summary - Shwetha Alston 35 y o  female MRN: [id number]    Unit/Bed#: L&D 326-01 Encounter: [id number]    Admission Date: 2021     Discharge Date: 21    Admitting Diagnosis:   Patient Active Problem List   Diagnosis    Atypical squamous cell changes of undetermined significance (ASCUS) on cervical cytology with positive high risk human papilloma virus (HPV)    Posterior left knee pain    Left hamstring muscle strain    GBS bacteriuria    Encounter for supervision of normal first pregnancy in second trimester    33 weeks gestation of pregnancy    Abnormal glucose affecting pregnancy    Obesity in pregnancy    Severe pre-eclampsia in third trimester    Epigastric abdominal pain affecting pregnancy in third trimester    HSV (herpes simplex virus) infection    Pre-eclampsia, severe, third trimester     (spontaneous vaginal delivery)       Discharge Diagnosis:   Same, delivered    Procedures:   spontaneous vaginal delivery    Admitting Attending: Dr David Eden  Delivery Attending: Dr Rhonda Horton MD  Discharge Attending: Dr Wise Ear Course:     Shwetha Alston is a [id number] now [id number] who had initially presented for abdominal pain that was thought to be secondary to PreE   She received a full course of betamethasone for fetal lung maturity  She then developed severe features with SRBP in addition to her epigastric pain  She required labetalol on various occasions and was then received 24 hours of magnesium antepartum  She was started on Procardia XL 30 mg daily  Her liver enzymes increased throughout her admission  Her blood pressures continued to become severe and required more frequent treatment, and the decision was made to proceed with IOL in the setting of worsening PreE w/ SF and she was restarted on magnesium for seizure prophylaxis  She received cytotec, a polk balloon and was started on pitocin    She was AROM'ed for clear at 22 [id number] and rapidly progressed to complete  She then underwent an uncomplicated spontaneous vaginal delivery and delivered a viable male  at 80  APGARS were 8, 9 at 1 and 5 minutes, respectively  Placenta was delivered at 0740   was then transferred to the NICU  Patient tolerated the procedure well and was transferred to recovery in stable condition  The patient's post partum course was unremarkable  Her blood pressures remained well controlled and did not require treatment in the postpartum period  Her liver enzymes continue to be monitored and down trended appropriately  She received this magnesium for 24 hours postpartum  On day of discharge, she was ambulating and able to reasonably perform all ADLs  She was voiding and had appropriate bowel function  Pain was well controlled  She was discharged home on postpartum day #2 without complications  Patient was instructed to follow up with her OB as an outpatient and was given appropriate warnings to call provider if she develops signs of infection or uncontrolled pain  Condition at discharge:   good     Disposition:   Home    Planned Readmission:   No    Discharge Medications:   Prenatal vitamin daily for 6 months or the duration of nursing whichever is longer  Motrin 600 mg orally every 6 hours as needed for pain  Tylenol (over the counter) per bottle directions as needed for pain  Hydrocortisone cream 1% (over the counter) applied 1-2x daily to hemorrhoids as needed  Witch hazel pads for hemorrhoidal discomfort as needed      Discharge instructions :   -Do not place anything (no partner, tampons or douche) in your vagina for 6 weeks    -You may walk for exercise for the first 6 weeks then gradually return to your usual activities    -Please do not drive for 1 week if you have no stitches and for 2 weeks if you have stitches or underwent a  delivery     -You may take baths or shower per your preference    -Please look at your bust (breasts) in the mirror daily and call provider for redness or tenderness or increased warmth  - If you have had a  please look at your incision daily as well and call provider for increasing redness or steady drainage from the incision    -Please call your provider if temperature > 100 4*F or 38* C, worsening pain or a foul discharge

## 2021-06-28 NOTE — PROGRESS NOTES
Talked patients  this morning told me his wife had their baby and was in NICU, visited with patient and  both mother and baby doing well  Will continue to check on baby and mother

## 2021-06-28 NOTE — OB LABOR/OXYTOCIN SAFETY PROGRESS
Oxytocin Safety Progress Check Note - Media Bartow 35 y o  female MRN: [id number]    Unit/Bed#: L&D 326-01 Encounter: [id number]    Dose (jude-units/min) Oxytocin: 20 jude-units/min  Contraction Frequency (minutes): 2-6  Contraction Quality: Moderate  Tachysystole: No   Cervical Dilation: 5        Cervical Effacement: 80  Fetal Station: -1  Baseline Rate: 125 bpm  Fetal Heart Rate: 140 BPM  FHR Category: Category I               Vital Signs:   Vitals:    06/28/21 0507   BP: (!) 132/106   Pulse: 87   Resp:    Temp:    SpO2:            Notes/comments: SVE as above  Fetal head feels engaged  Membranes taut against the cervix  Will attempt AROM with next exam with FSE for better control  BP not requiring IV treatment  Will continue to monitor  Dr Nidia Cowart aware          Arcenio Suero MD 6/28/2021 5:09 AM

## 2021-06-28 NOTE — OB LABOR/OXYTOCIN SAFETY PROGRESS
Oxytocin Safety Progress Check Note - Rudy De Anda 35 y o  female MRN: [id number]    Unit/Bed#: L&D 326-01 Encounter: [id number]    Dose (jude-units/min) Oxytocin: 16 jude-units/min  Contraction Frequency (minutes): 2-3  Contraction Quality: Moderate  Tachysystole: No   Cervical Dilation: 5        Cervical Effacement: 80  Fetal Station: -1  Baseline Rate: 125 bpm  Fetal Heart Rate: 140 BPM  FHR Category: Category I               Vital Signs:   Vitals:    06/28/21 0306   BP: 146/94   Pulse: 85   Resp:    Temp:    SpO2:            Notes/comments: Exam deferred  Patient sleeping  Continue position changes  FHT category  BP continue to be non-severe          Tasha Whaley MD 6/28/2021 3:30 AM

## 2021-06-28 NOTE — L&D DELIVERY NOTE
DELIVERY NOTE  Abdulkadir Archibald 35 y o  female MRN: [id number]  Unit/Bed#: L&D 326-01 Encounter: [id number]    Obstetrician:    Dr Maranda Bush MD    Assistant:   Dr Netta Ferguson    Pre-Delivery Diagnosis:   Patient Active Problem List   Diagnosis    Atypical squamous cell changes of undetermined significance (ASCUS) on cervical cytology with positive high risk human papilloma virus (HPV)    Posterior left knee pain    Left hamstring muscle strain    GBS bacteriuria    Encounter for supervision of normal first pregnancy in second trimester    33 weeks gestation of pregnancy    Abnormal glucose affecting pregnancy    Obesity in pregnancy    Severe pre-eclampsia in third trimester    Epigastric abdominal pain affecting pregnancy in third trimester    HSV (herpes simplex virus) infection    Pre-eclampsia, severe, third trimester     (spontaneous vaginal delivery)       Post-Delivery Diagnosis:   Same as above - Delivered    Procedure:  Spontaneous vaginal delivery    Specimens:   Cord blood obtained   Placenta; normal appearing, central insertion, intact   Arterial and venous blood gases (below)     Gases:  Umbilical Cord Venous Blood Gas:  Results from last 7 days   Lab Units 21  0739   PH COV  7 402   PCO2 COV mm HG 35 0   HCO3 COV mmol/L 21 3   BASE EXC COV mmol/L -2 6*   O2 CT CD VB mL/dL 14 6   O2 HGB, VENOUS CORD % 72 3     Umbilical Cord Arterial Blood Gas:  Results from last 7 days   Lab Units 21  0739   PH COA  7 301   PCO2 COA  46 2   PO2 COA mm HG 29 1*   HCO3 COA mmol/L 22 3   BASE EXC COA mmol/L -4 4*   O2 CONTENT CORD ART ml/dl 17 0   O2 HGB, ARTERIAL CORD % 65 1       Quantitative Blood Loss:   25 mL           Complications:    None    Brief Description of Labor Course:  Abdulkadir Archibald is a  now  who had initially presented for abdominal pain that was thought to be secondary to PreE   She then developed severe features with SRBP in addition to her epigastric pain   She required labetalol on various occasions and was then started on magnesium  Her liver enzymes increased throughout her admission and are being monitored closely  The decision was made to proceed with IOL in the setting of PreE w/ SF  She received cytotec, a polk balloon and was started on pitocin  She was AROM'ed for clear at 0440 and rapidly progressed to complete  Description of Delivery:   With the assistance of maternal expulsive efforts and gentle downward traction of the fetal head, the anterior (right) shoulder was delivered without difficulty, followed by the remainder of the infant's body and contralateral arm  Patient then delivered a viable male  at 80 over perineum with a none and 1st degree spontaneous perineal laceration  A nuchal cord was not noted  After delivery of the , delayed clamping of the umbilical cord was undertaken for 60 seconds  The  was noted to have good tone and cry spontaneously  There was no apparent injury to the   The cord was then doubly clamped and cut and the  was passed off to  staff for routine care  Umbilical cord blood and umbilical artery and venous gases were collected  Placenta was delivered at 0740 with fundal massage and gentle traction on the cord with active management of the third stage of labor  Placenta delivered intact with a 3-vessel cord  Active management of the third stage of labor was undertaken with IV pitocin at 250 milliunits/min  Bleeding was noted to be under control   Outcome:  Living  with APGARS 8, 9 at 1 and 5 minutes, respectively   weight pending    Perineal Inspection  Inspection of the perineum, vagina, labia, cervix, and urethra revealed a small first degree laceration and a left labial laceration  Pressure was held on both lacerations, which achieved hemostasis  Conclusion:  Mother and baby are currently recovering nicely in stable condition      Attending Supervision:   Dr Ruthy Khan MD was present for the entire procedure      Sarita Leigh MD  PGY-1 OB/GYN   6/28/2021 8:52 AM

## 2021-06-28 NOTE — ANESTHESIA POSTPROCEDURE EVALUATION
Post-Op Assessment Note    CV Status:  Stable  Pain Score: 0    Pain management: adequate     Mental Status:  Alert and awake   Hydration Status:  Euvolemic   PONV Controlled:  Controlled   Airway Patency:  Patent      Post Op Vitals Reviewed: Yes      Staff: Anesthesiologist     Post-op block assessment: catheter intact and no complications      No complications documented      BP      Temp      Pulse     Resp      SpO2

## 2021-06-28 NOTE — OB LABOR/OXYTOCIN SAFETY PROGRESS
Oxytocin Safety Progress Check Note - Catrachita Mccarty 35 y o  female MRN: [id number]    Unit/Bed#: L&D 326-01 Encounter: [id number]    Dose (jude-units/min) Oxytocin: 16 jude-units/min  Contraction Frequency (minutes): 2-5  Contraction Quality: Moderate  Tachysystole: No   Cervical Dilation: 5        Cervical Effacement: 80  Fetal Station: -1  Baseline Rate: 120 bpm  Fetal Heart Rate: 140 BPM  FHR Category: Category I               Vital Signs:   Vitals:    06/27/21 2151   BP: 132/86   Pulse: 82   Resp:    Temp:    SpO2:            Notes/comments: SVE as above  Head still ballotable and not suitable for AROM  BP non-severe  Will follow up labs  FHT category 1  Will continue to monitor  Dr Bhavna Saab aware          Yuliet Ho MD 6/27/2021 10:14 PM

## 2021-06-28 NOTE — OB LABOR/OXYTOCIN SAFETY PROGRESS
Oxytocin Safety Progress Check Note - Tari Rosales 35 y o  female MRN: [id number]    Unit/Bed#: L&D 326-01 Encounter: [id number]    Dose (jude-units/min) Oxytocin: 14 jude-units/min  Contraction Frequency (minutes): 2  Contraction Quality: Moderate  Tachysystole: No   Cervical Dilation: 5        Cervical Effacement: 80  Fetal Station: -1  Baseline Rate: 120 bpm  Fetal Heart Rate: 140 BPM  FHR Category: Category I               Vital Signs:   Vitals:    06/27/21 1950   BP:    Pulse: 92   Resp:    Temp:    SpO2: 97%           Notes/comments:    Examined patient after epidural  Taut/bulging bag noted and difficult to feel presenting part  Noted to be vertex on US  Will hold off on amniotomy as fetal head not well applied to cervix  Discussed risks including compound presentation/prolapsed cord  Continue oxytocin        Alvin Griffith MD 6/27/2021 8:05 PM

## 2021-06-28 NOTE — OB LABOR/OXYTOCIN SAFETY PROGRESS
Oxytocin Safety Progress Check Note - Billy Kelly 35 y o  female MRN: [id number]    Unit/Bed#: L&D 326-01 Encounter: [id number]    Dose (jude-units/min) Oxytocin: 20 jude-units/min  Contraction Frequency (minutes): 2-4  Contraction Quality: Moderate  Tachysystole: No   Cervical Dilation: 6        Cervical Effacement: 90  Fetal Station: 0  Baseline Rate: 125 bpm  Fetal Heart Rate: 120 BPM  FHR Category: Category I               Vital Signs:   Vitals:    06/28/21 0514   BP: 137/94   Pulse: 89   Resp:    Temp:    SpO2:            Notes/comments:     Patient is comfortable with epidural   Discussed with patient amniotomy to continue induction of labor  Discussed risks with early amniotomy with ballottable fetal head including cord prolapse  Cervical exam shows station 0  US shows vertex presentation  Fetal head is engaged and is ballotable, bulging bag noted  FSE used to perform amniotomy (controlled amniotomy), during a contraction and amniotomy performed  FSE was removed, clear fluid was noted         Jaylene Dietrich MD 6/28/2021 5:51 AM

## 2021-06-28 NOTE — DISCHARGE INSTRUCTIONS
Self Care After Delivery   AMBULATORY CARE:   The postpartum period  is the period of time from delivery to about 6 weeks  During this time you may experience many physical and emotional changes  It is important to understand what is normal and when you need to call your healthcare provider  It is also important to know how to care for yourself during this time  Call your local emergency number (911 in the [address] [facility]) for any of the following:   · You see or hear things that are not there, or have thoughts of harming yourself or your baby  · You soak through 1 pad in 15 minutes, have blurry vision, clammy or pale skin, and feel faint  · You faint or lose consciousness  · You have trouble breathing  · You cough up blood  · Your  incision comes apart  Seek care immediately if:   · Your heart is beating faster than usual     · You have a bad headache or changes in your vision  · Your episiotomy or  incision is red, swollen, bleeding, or draining pus  · You have severe abdominal pain  Call your doctor or obstetrician if:   · Your leg is painful, red, and larger than usual     · You soak through 1 or more pads in an hour, or pass blood clots larger than a quarter from your vagina  · You have a fever  · You have new or worsening pain in your abdomen or vagina  · You continue to have depression 1 to 2 weeks after you deliver  · You have trouble sleeping  · You have foul-smelling discharge from your vagina  · You have pain or burning when you urinate  · You do not have a bowel movement for 3 days or more  · You have nausea or are vomiting  · You have hard lumps or red streaks over your breasts  · You have cracked nipples or bleed from your nipples  · You have questions or concerns about your condition or care  Physical changes:   The following are normal changes after you give birth:  · Pain in the area between your anus and vagina    · Breast pain    · Constipation or hemorrhoids    · Hot or cold flashes    · Vaginal bleeding or discharge    · Mild to moderate abdominal cramping    · Difficulty controlling bowel movements or urine    Emotional changes:  A drop in hormone levels after you deliver may cause changes in your emotions  You may feel irritable, sad, or anxious  You may cry easily or for no reason  You may also feel depressed  Depression that continues can be a sign of postpartum depression, a condition that can be treated  Treatment may include talk therapy, medicines, or both  Healthcare providers will ask how you are feeling and if you have any depression  These talks can happen during appointments for your medical care and for your baby's care, such as well child visits  Providers can help you find ways to care for yourself and your baby  Talk to your providers about the following:  · When emotional changes or depression started, and if it is getting worse over time    · Problems you are having with daily activities, sleep, or caring for your baby    · If anything makes you feel worse, or makes you feel better    · Feeling that you are not bonding with your baby the way you want    · Any problems your baby has with sleeping or feeding    · Your baby is fussy or cries a lot    · Support you have from friends, family, or others    Breast care for breastfeeding mothers: You may have sore breasts for 3 to 6 days after you give birth  This happens as your milk begins to fill your breasts  You may also have sore breasts if you do not breastfeed frequently  Do the following to care for your breasts:  · Apply a moist, warm, compress to your breast as directed  This may help soothe your breasts  Make sure the washcloth is not too hot before you apply it to your breast     · Nurse your baby or pump your milk frequently  This may prevent clogged milk ducts  Ask your healthcare provider how often to nurse or pump      · Massage your breasts as directed  This may help increase your milk flow  Gently rub your breasts in a circular motion before you breastfeed  You may need to gently squeeze your breast or nipple to help release milk  You can also use a breast pump to help release milk from your breast     · Wash your breasts with warm water only  Do not put soap on your nipples  Soap may cause your nipples to become dry  · Apply lanolin cream to your nipples as directed  Lanolin cream may add moisture to your skin and prevent nipple dryness  Always  wash off lanolin cream with warm water before you breastfeed  · Place pads in your bra  Your nipples may leak milk when you are not breastfeeding  You can place pads inside of your bra to help prevent leaking onto your clothing  Ask your healthcare provider where to purchase bra pads  · Get breastfeeding support if needed  Healthcare providers can answer questions about breastfeeding and provide you with support  Ask your healthcare provider who you can contact if you need breastfeeding support  Breast care for non-breastfeeding mothers:  Milk will fill your breasts even if you bottle feed your baby  Do the following to help stop your milk from filling your breasts and causing pain:  · Wear a bra with support at all times  A sports bra or a tight-fitting bra will help stop your milk from coming in  · Apply ice on each breast for 15 to 20 minutes every hour or as directed  Use an ice pack, or put crushed ice in a plastic bag  Cover it with a towel before you apply it to your breast  Ice helps your milk ducts shrink  · Keep your breasts away from warm water  Warm water will make it easier for milk to fill your breasts  Stand with your breasts away from warm water in the shower  · Limit how much you touch your breasts  This will prevent them from filling with milk  Perineum care: Your perineum is the area between your rectum and vagina   It is normal to have swelling and pain in this area after you give birth  If you had an episiotomy, your healthcare provider may give you special instructions  · Clean your perineum after you use the bathroom  This may prevent infection and help with healing  Use a spray bottle with warm water to clean your perineum  You may also gently spray warm water against your perineum when you urinate  Always wipe front to back  · Take a sitz bath as directed  A sitz bath may help relieve swelling and pain  Fill your bath tub or bucket with water up to your hips and sit in the water  Use cold water for 2 days after you deliver  Then use warm water  Ask your healthcare provider for more information about a sitz bath  · Apply ice packs for the first 24 hours or as directed  Use a plastic glove filled with ice or buy an ice pack  Wrap the ice pack or plastic glove in a small towel or wash cloth  Place the ice pack on your perineum for 20 minutes at a time  · Sit on a donut-shaped pillow  This may relieve pressure on your perineum when you sit  · Use wipes that contain medicine or take pills as directed  Your healthcare provider may tell you to use witch hazel pads  You can place witch hazel pads in the refrigerator before you apply them to your perineum  Your provider may also tell you to take NSAIDs  Ask him or her how often to take pills or use the wipes  · Do not go swimming or take tub baths for 4 to 6 weeks or as directed  This will help prevent an infection in your vagina or uterus  Bowel and bladder care: It may take 3 to 5 days to have a bowel movement after you deliver your baby  You can do the following to prevent or manage constipation, and get control of your bowel or bladder:  · Take stool softeners as directed  A stool softener is medicine that will make your bowel movements softer  This may prevent or relieve constipation  A stool softener may also make bowel movements less painful  · Drink plenty of liquids    Ask how much liquid to drink each day and which liquids are best for you  Liquids may help prevent constipation  · Eat foods high in fiber  Examples include fruits, vegetables, grains, beans, and lentils  Ask your healthcare provider how much fiber you need each day  Fiber may prevent constipation  · Do Kegel exercises as directed  Kegel exercises will help strengthen the muscles that control bowel movements and urination  Ask your healthcare provider for more information on Kegel exercises  · Apply cold compresses or medicine to hemorrhoids as directed  This may relieve swelling and pain  Your healthcare provider may tell you to apply ice or wipes that contain medicine to your hemorrhoids  He or she may also tell you to use a sitz bath  Ask your provider for more information on how to manage hemorrhoids  Nutrition:  Good nutrition is important in the postpartum period  It will help you return to a healthy weight, increase your energy levels, and prevent constipation  It will also help you get enough nutrients and calories if you are going to breastfeed your baby  · Eat a variety of healthy foods  Healthy foods include fruits, vegetables, whole-grain breads, low-fat dairy products, beans, lean meats, and fish  You may need 500 to 700 extra calories each day if you breastfeed your baby  You may also need extra protein  · Limit foods with added sugar and high amounts of fat  These foods are high in calories and low in healthy nutrients  Read food labels so you know how much sugar and fat is in the food you want to eat  · Drink 8 to 10 glasses of water per day  Water will help you make plenty of milk for your baby  It will also help prevent constipation  Drink a glass of water every time you breastfeed your baby  · Take vitamins as directed  Ask your healthcare provider what vitamins you need  · Limit caffeine and alcohol if you are breastfeeding    Caffeine and alcohol can get into your breast milk  Caffeine and alcohol can make your baby fussy  They can also interfere with your baby's sleep  Ask your healthcare provider if you can drink alcohol or caffeine  Rest and sleep: You may feel very tired in the postpartum period  Enough sleep will help you heal and give you energy to care for your baby  The following may help you get sleep and rest:  · Nap when your baby naps  Your baby may nap several times during the day  Get rest during this time  · Limit visitors  Many people may want to see you and your baby  Ask friends or family to visit on different days  This will give you time to rest     · Do not plan too much for one day  Put off household chores so that you have time to rest  Gradually do more each day  · Ask for help from family, friends, or neighbors  Ask them to help you with laundry, cleaning, or errands  Also ask someone to watch the baby while you take a nap or relax  Ask your partner to help with the care of your baby  Pump some of your breast milk so your partner can feed your baby during the night  Exercise after delivery:  Wait until your healthcare provider says it is okay to exercise  Exercise can help you lose weight, increase your energy levels, and manage your mood  It can also prevent constipation and blood clots  Start with gentle exercises such as walking  Do more as you have more energy  You may need to avoid abdominal exercises for 1 to 2 weeks after you deliver  Talk to your healthcare provider about an exercise plan that is right for you  Sexual activity after delivery:   · Do not have sex until your healthcare provider says it is okay  You may need to wait 4 to 6 weeks before you have sex  This may prevent infection and allow time to heal     · Your menstrual cycle may begin as soon as 3 weeks after you deliver  Your period may be delayed if you breastfeed your baby  You can become pregnant before you get your first postpartum period   Talk to your healthcare provider about birth control that is right for you  Some types of birth control are not safe during breastfeeding  For support and more information:  Join a support group for new mothers  Ask for help from family and friends with chores, errands, and care of your baby  · Office of Women's Health,  Department of Health and Human Services  [address], [address] [facility] [address]  [address], [address] [facility] [address]  Phone: 8- 638 - [phone]  Web Address: www womenshealth gov  · [facility] Postpartum [address] [place] , [address]  [address] [facility] , [address] [facility]  Web Address: [place]  [place] [address]/pregnancy/postpartum-care  aspx  Follow up with your doctor or obstetrician as directed: You will need to follow up within 2 to 6 weeks of delivery  Write down your questions so you remember to ask them at your visits  © Copyright [address] Information is for End User's use only and may not be sold, redistributed or otherwise used for commercial purposes  All illustrations and images included in CareNotes® are the copyrighted property of [employer]  or [facility] [place]   The above information is an  only  It is not intended as medical advice for individual conditions or treatments  Talk to your doctor, nurse or pharmacist before following any medical regimen to see if it is safe and effective for you

## 2021-06-28 NOTE — PLAN OF CARE
Problem: PAIN - ADULT  Goal: Verbalizes/displays adequate comfort level or baseline comfort level  Description: Interventions:  - Encourage patient to monitor pain and request assistance  - Assess pain using appropriate pain scale  - Administer analgesics based on type and severity of pain and evaluate response  - Implement non-pharmacological measures as appropriate and evaluate response  - Consider cultural and social influences on pain and pain management  - Notify physician/advanced practitioner if interventions unsuccessful or patient reports new pain  Outcome: Progressing     Problem: INFECTION - ADULT  Goal: Absence or prevention of progression during hospitalization  Description: INTERVENTIONS:  - Assess and monitor for signs and symptoms of infection  - Monitor lab/diagnostic results  - Monitor all insertion sites, i e  indwelling lines, tubes, and drains  - Monitor endotracheal if appropriate and nasal secretions for changes in amount and color  - [place] appropriate cooling/warming therapies per order  - Administer medications as ordered  - Instruct and encourage patient and family to use good hand hygiene technique  - Identify and instruct in appropriate isolation precautions for identified infection/condition  Outcome: Progressing     Problem: SAFETY ADULT  Goal: Patient will remain free of falls  Description: INTERVENTIONS:  - Keep Call bell within reach  - Keep bed low and locked with side rails adjusted as appropriate  - Keep care items and personal belongings within reach  - Initiate and maintain comfort rounds    Outcome: Progressing     Problem: DISCHARGE PLANNING  Goal: Discharge to home or other facility with appropriate resources  Description: INTERVENTIONS:  - Identify barriers to discharge w/patient and caregiver  - Arrange for needed discharge resources and transportation as appropriate  - Identify discharge learning needs (meds, wound care, etc )  - Arrange for interpretive services to assist at discharge as needed  - Refer to Case Management Department for coordinating discharge planning if the patient needs post-hospital services based on physician/advanced practitioner order or complex needs related to functional status, cognitive ability, or social support system  Outcome: Progressing     Problem: POSTPARTUM  Goal: Experiences normal postpartum course  Description: INTERVENTIONS:  - Monitor maternal vital signs  - Assess uterine involution and lochia  Outcome: Progressing  Goal: Appropriate maternal -  bonding  Description: INTERVENTIONS:  - Identify family support  - Assess for appropriate maternal/infant bonding   -Encourage maternal/infant bonding opportunities  - Referral to  or  as needed  Outcome: Progressing  Goal: Establishment of infant feeding pattern  Description: INTERVENTIONS:  - Assess breast/bottle feeding  - Refer to lactation as needed  Outcome: Progressing  Goal: Incision(s), wounds(s) or drain site(s) healing without S/S of infection  Description: INTERVENTIONS  - Assess and document dressing, incision, wound bed, drain sites and surrounding tissue  - Provide patient and family education  Outcome: Progressing     Problem: Knowledge Deficit  Goal: Verbalizes understanding of labor plan  Description: Assess patient/family/caregiver's baseline knowledge level and ability to understand information  Provide education via patient/family/caregiver's preferred learning method at appropriate level of understanding  1  Provide teaching at level of understanding  2  Provide teaching via preferred learning method(s)  Outcome: Completed  Goal: Patient/family/caregiver demonstrates understanding of disease process, treatment plan, medications, and discharge instructions  Description: Complete learning assessment and assess knowledge base    Interventions:  - Provide teaching at level of understanding  - Provide teaching via preferred learning methods  Outcome: Completed     Problem: Labor & Delivery  Goal: Manages discomfort  Description: Assess and monitor for signs and symptoms of discomfort  Assess patient's pain level regularly and per hospital policy  Administer medications as ordered  Support use of nonpharmacological methods to help control pain such as distraction, imagery, relaxation, and application of heat and cold  Collaborate with interdisciplinary team and patient to determine appropriate pain management plan  1  Include patient in decisions related to comfort  2  Offer non-pharmacological pain management interventions  3  Report ineffective pain management to physician  Outcome: Completed  Goal: Patient vital signs are stable  Description: 1  Assess vital signs - vaginal delivery    Outcome: Completed     Problem: INFECTION - ADULT  Goal: Absence of fever/infection during neutropenic period  Description: INTERVENTIONS:  - Monitor WBC    Outcome: Completed     Problem: SAFETY ADULT  Goal: Maintain or return to baseline ADL function  Description: INTERVENTIONS:  -  Assess patient's ability to carry out ADLs; assess patient's baseline for ADL function and identify physical deficits which impact ability to perform ADLs (bathing, care of mouth/teeth, toileting, grooming, dressing, etc )  - Assess/evaluate cause of self-care deficits   - Assess range of motion  - Assess patient's mobility; develop plan if impaired  - Assess patient's need for assistive devices and provide as appropriate  - Encourage maximum independence but intervene and supervise when necessary  - Involve family in performance of ADLs  - Assess for home care needs following discharge   - Consider OT consult to assist with ADL evaluation and planning for discharge  - Provide patient education as appropriate  Outcome: Completed  Goal: Maintains/Returns to pre admission functional level  Description: INTERVENTIONS:  - Perform BMAT or MOVE assessment daily    - Set and communicate daily mobility goal to care team and patient/family/caregiver     - Collaborate with rehabilitation services on mobility goals if consulted  - Out of bed for toileting  - Record patient progress and toleration of activity level   Outcome: Completed

## 2021-06-29 LAB
ALBUMIN SERPL BCP-MCNC: 1.9 G/DL (ref 3.5–5)
ALBUMIN SERPL BCP-MCNC: 1.9 G/DL (ref 3.5–5)
ALP SERPL-CCNC: 178 U/L (ref 46–116)
ALP SERPL-CCNC: 179 U/L (ref 46–116)
ALT SERPL W P-5'-P-CCNC: 127 U/L (ref 12–78)
ALT SERPL W P-5'-P-CCNC: 135 U/L (ref 12–78)
ANION GAP SERPL CALCULATED.3IONS-SCNC: 8 MMOL/L (ref 4–13)
ANION GAP SERPL CALCULATED.3IONS-SCNC: 9 MMOL/L (ref 4–13)
AST SERPL W P-5'-P-CCNC: 41 U/L (ref 5–45)
AST SERPL W P-5'-P-CCNC: 43 U/L (ref 5–45)
BILIRUB SERPL-MCNC: 0.14 MG/DL (ref 0.2–1)
BILIRUB SERPL-MCNC: 0.17 MG/DL (ref 0.2–1)
BUN SERPL-MCNC: 8 MG/DL (ref 5–25)
BUN SERPL-MCNC: 9 MG/DL (ref 5–25)
CALCIUM ALBUM COR SERPL-MCNC: 8.7 MG/DL (ref 8.3–10.1)
CALCIUM ALBUM COR SERPL-MCNC: 8.7 MG/DL (ref 8.3–10.1)
CALCIUM SERPL-MCNC: 7 MG/DL (ref 8.3–10.1)
CALCIUM SERPL-MCNC: 7 MG/DL (ref 8.3–10.1)
CHLORIDE SERPL-SCNC: 105 MMOL/L (ref 100–108)
CHLORIDE SERPL-SCNC: 106 MMOL/L (ref 100–108)
CO2 SERPL-SCNC: 24 MMOL/L (ref 21–32)
CO2 SERPL-SCNC: 24 MMOL/L (ref 21–32)
CREAT SERPL-MCNC: 0.6 MG/DL (ref 0.6–1.3)
CREAT SERPL-MCNC: 0.67 MG/DL (ref 0.6–1.3)
ERYTHROCYTE [DISTWIDTH] IN BLOOD BY AUTOMATED COUNT: 13.6 % (ref 11.6–15.1)
GFR SERPL CREATININE-BSD FRML MDRD: 119 ML/MIN/1.73SQ M
GFR SERPL CREATININE-BSD FRML MDRD: 124 ML/MIN/1.73SQ M
GLUCOSE SERPL-MCNC: 82 MG/DL (ref 65–140)
GLUCOSE SERPL-MCNC: 83 MG/DL (ref 65–140)
HCT VFR BLD AUTO: 27.6 % (ref 34.8–46.1)
HGB BLD-MCNC: 9.1 G/DL (ref 11.5–15.4)
MCH RBC QN AUTO: 27.7 PG (ref 26.8–34.3)
MCHC RBC AUTO-ENTMCNC: 33 G/DL (ref 31.4–37.4)
MCV RBC AUTO: 84 FL (ref 82–98)
PLATELET # BLD AUTO: 234 THOUSANDS/UL (ref 149–390)
PMV BLD AUTO: 12 FL (ref 8.9–12.7)
POTASSIUM SERPL-SCNC: 3.7 MMOL/L (ref 3.5–5.3)
POTASSIUM SERPL-SCNC: 3.7 MMOL/L (ref 3.5–5.3)
PROT SERPL-MCNC: 6 G/DL (ref 6.4–8.2)
PROT SERPL-MCNC: 6.1 G/DL (ref 6.4–8.2)
RBC # BLD AUTO: 3.28 MILLION/UL (ref 3.81–5.12)
SODIUM SERPL-SCNC: 138 MMOL/L (ref 136–145)
SODIUM SERPL-SCNC: 138 MMOL/L (ref 136–145)
WBC # BLD AUTO: 18.73 THOUSAND/UL (ref 4.31–10.16)

## 2021-06-29 PROCEDURE — 85027 COMPLETE CBC AUTOMATED: CPT | Performed by: OBSTETRICS & GYNECOLOGY

## 2021-06-29 PROCEDURE — 80053 COMPREHEN METABOLIC PANEL: CPT | Performed by: OBSTETRICS & GYNECOLOGY

## 2021-06-29 PROCEDURE — 99024 POSTOP FOLLOW-UP VISIT: CPT | Performed by: OBSTETRICS & GYNECOLOGY

## 2021-06-29 RX ADMIN — SODIUM CHLORIDE 25 ML/HR: 0.9 INJECTION, SOLUTION INTRAVENOUS at 00:14

## 2021-06-29 RX ADMIN — VALACYCLOVIR HYDROCHLORIDE 500 MG: 500 TABLET, FILM COATED ORAL at 08:04

## 2021-06-29 RX ADMIN — DOCUSATE SODIUM 100 MG: 100 CAPSULE ORAL at 17:07

## 2021-06-29 RX ADMIN — MAGNESIUM SULFATE HEPTAHYDRATE 2 G/HR: 40 INJECTION, SOLUTION INTRAVENOUS at 01:05

## 2021-06-29 RX ADMIN — SENNOSIDES 8.6 MG: 8.6 TABLET ORAL at 08:04

## 2021-06-29 RX ADMIN — NIFEDIPINE 30 MG: 30 TABLET, FILM COATED, EXTENDED RELEASE ORAL at 08:04

## 2021-06-29 RX ADMIN — DOCUSATE SODIUM 100 MG: 100 CAPSULE ORAL at 08:04

## 2021-06-29 NOTE — UTILIZATION REVIEW
Initial Clinical Review    Admission: Date/Time/Statement:   Admission Orders (From admission, onward)     Ordered        06/24/21 0029  Inpatient Admission  Once                   Orders Placed This Encounter   Procedures    Inpatient Admission     Standing Status:   Standing     Number of Occurrences:   1     Order Specific Question:   Level of Care     Answer:   Med Surg [16]     Order Specific Question:   Estimated length of stay     Answer:   More than 2 Midnights     Order Specific Question:   Certification     Answer:   I certify that inpatient services are medically necessary for this patient for a duration of greater than two midnights  See H&P and MD Progress Notes for additional information about the patient's course of treatment  Initial Presentation: 34 yo  G 2 P 0 presented to L&D as inpatient admission for severe preeclampsia @ 32 4/7 wks  As per Pstient c/o abdominal pain in epigastric area, nausea (+) edema  BP on admission 188/104, followed by 174/102 and 166/102, patient receive management with 20 mg of IV labetalol  Subsequently evidence of blood pressure of 170/102 patient receive 40 mg of labetalol  IV MGSO4 started BTM 12 mg x doses q 12 hrs   Plan monitor lab BP's continuous external monitoring IV antihypertensive medications as needed and supportive care  Consult perinatology and neonatology  Pregnancy complicated by history of GBS bacteriuria, ASCUS, HSV and rubella NI   an elevated 1 hour GTT (3 hour GTT has not been performed)    06-24-21 HD # 1  32 3/7 wks remains IV MGSO4 second dose BTM Category II FHT earlier this morning with indeterminate deceleration at 0800   Moderate variability with accelerations otherwise    Admitting Vitals   Temperature Pulse Respirations Blood Pressure SpO2   06/23/21 2336 06/23/21 2336 06/24/21 0100 06/23/21 2336 06/24/21 0050   98 6 °F (37 °C) 58 18 (!) 174/102 98 %      Temp Source Heart Rate Source Patient Position - Orthostatic VS BP Location FiO2 (%)   06/23/21 2336 06/23/21 2336 -- 06/23/21 2336 --   Oral Monitor  Left arm       Pain Score       06/23/21 2336       9          Wt Readings from Last 1 Encounters:   06/24/21 71 7 kg (158 lb)     Additional Vital Signs:   06/24/21 0543  --  74  --  133/80  --  --   06/24/21 0503  --  69  --  136/82  --  --   06/24/21 0500  --  --  16  --  99 %  None (Room air)   06/24/21 0449  --  71  --  149/93  --  --   06/24/21 0433  --  79  --  137/81  --  --   06/24/21 0418  --  68  --  134/86  --  --   06/24/21 0403  --  75  --  137/91  --  --   06/24/21 0348  --  65  --  149/89  --  --   06/24/21 0332  --  65  --  166/94   --  --   BP: Dr Hitesh Edgar notified at 06/24/21 0332   06/24/21 0308  --  75  --  144/95  --  --   06/24/21 0300  --  --  18  --  98 %  None (Room air)   06/24/21 0253  --  68  --  143/85  --  --   06/24/21 0229  --  70  --  134/89  --  --   06/24/21 0219  --  65  --  147/95  --  --   06/24/21 0209  --  65  --  152/105Abnormal   --  --   06/24/21 0159  --  61  --  141/93  --  --   06/24/21 0150  --  62  --  151/87  --  --   06/24/21 0139  --  61  --  136/88  --  --   06/24/21 0132  --  66  --  141/89  --  --   06/24/21 0119  --  73  --  136/94  --  --   06/24/21 0111  --  69  --  150/98  --  --   06/24/21 0100  --  62  18  161/94  --  --   06/24/21 0050  --  --  --  --  98 %  None (Room air)   06/24/21 0038  --  61  --  170/103Abnormal    --  --   BP: Dr Hitesh Edgar made aware at 06/24/21 0038   06/24/21 0026  --  57  --  170/102Abnormal   --  --   06/23/21 2350  --  --  --  166/102Abnormal   --         Pertinent Labs/Diagnostic Test Results:       Results from last 7 days   Lab Units 06/29/21  0537 06/28/21  2341 06/28/21  1756 06/28/21  1152 06/28/21  0539 06/23/21  2357   WBC Thousand/uL 18 73* 17 91* 19 83* 17 67* 15 81* 10 88*   HEMOGLOBIN g/dL 9 1* 8 7* 9 3* 9 4* 10 4* 10 6*   HEMATOCRIT % 27 6* 27 0* 27 9* 28 7* 31 9* 31 7*   PLATELETS Thousands/uL 234 228 228 234 266 251   NEUTROS ABS Thousands/µL --   --   --   --   --  6 81         Results from last 7 days   Lab Units 06/29/21  0537 06/28/21  2341 06/28/21  1756 06/28/21  1152 06/28/21  0539   SODIUM mmol/L 138 138 136 136 135*   POTASSIUM mmol/L 3 7 3 7 3 3* 3 4* 3 8   CHLORIDE mmol/L 105 106 104 103 102   CO2 mmol/L 24 24 23 24 22   ANION GAP mmol/L 9 8 9 9 11   BUN mg/dL 8 9 8 10 9   CREATININE mg/dL 0 60 0 67 0 62 0 61 0 64   EGFR ml/min/1 73sq m 124 119 122 123 121   CALCIUM mg/dL 7 0* 7 0* 6 9* 6 8* 7 2*     Results from last 7 days   Lab Units 06/29/21  0537 06/28/21  2341 06/28/21  1756 06/28/21  1152 06/28/21  0539   AST U/L 41 43 56* 56* 64*   ALT U/L 127* 135* 153* 151* 162*   ALK PHOS U/L 178* 179* 192* 185* 213*   TOTAL PROTEIN g/dL 6 1* 6 0* 6 1* 6 1* 6 8   ALBUMIN g/dL 1 9* 1 9* 1 9* 1 9* 2 2*   TOTAL BILIRUBIN mg/dL 0 17* 0 14* 0 18* 0 25 0 26     Results from last 7 days   Lab Units 06/26/21  2129 06/26/21  1758 06/26/21  1521 06/26/21  1139 06/26/21  0636 06/25/21  2101 06/25/21  1607 06/25/21  1029 06/25/21  0606 06/24/21  1857 06/24/21  1642 06/24/21  1514   POC GLUCOSE mg/dl 74 95 74 65 91 172* 77 225* 136 119 115 118     Results from last 7 days   Lab Units 06/29/21  0537 06/28/21  2341 06/28/21  1756 06/28/21  1152 06/28/21  0539 06/27/21  2356 06/27/21  1754 06/27/21  1211 06/27/21  0552 06/26/21  2302 06/26/21  0554 06/25/21  0612   GLUCOSE RANDOM mg/dL 83 82 111 108 84 85 105 85 86 75 95 121     Results from last 7 days   Lab Units 06/23/21  2357   CLARITY UA  Slightly Cloudy   COLOR UA  Yellow   SPEC GRAV UA  1 010   PH UA  6 5   GLUCOSE UA mg/dl Negative   KETONES UA mg/dl Negative   BLOOD UA  Small*   PROTEIN UA mg/dl 100 (2+)*   NITRITE UA  Negative   BILIRUBIN UA  Negative   UROBILINOGEN UA E U /dl 0 2   LEUKOCYTES UA  Large*   WBC UA /hpf Innumerable*   RBC UA /hpf 2-4   BACTERIA UA /hpf Occasional   EPITHELIAL CELLS WET PREP /hpf Occasional   CREATININE UR mg/dL 30 3   PROTEIN UR mg/dL 128   PROT/CREAT RATIO UR  4 22* Past Medical History:   Diagnosis Date    Abnormal Pap smear of cervix     Environmental allergies     Seasonal allergies     Severe pre-eclampsia in third trimester 6/24/2021    Varicella      Present on Admission:   Severe pre-eclampsia in third trimester   Epigastric abdominal pain affecting pregnancy in third trimester   GBS bacteriuria   Obesity in pregnancy      Admitting Diagnosis: Pain of upper abdomen [R10 10]  Age/Sex: 35 y o  female  Admission Orders:  Scheduled Medications:  docusate sodium, 100 mg, Oral, BID  measles-mumps-rubella, 0 5 mL, Subcutaneous, Once  NIFEdipine, 30 mg, Oral, Daily  senna, 1 tablet, Oral, Daily  valACYclovir, 500 mg, Oral, Daily      Continuous IV Infusions:  sodium chloride, 25 mL/hr, Intravenous, Continuous      PRN Meds:  acetaminophen, 650 mg, Oral, Q6H PRN  aluminum-magnesium hydroxide-simethicone, 15 mL, Oral, Q6H PRN  benzocaine-menthol-lanolin-aloe, , Topical, 4x Daily PRN  calcium carbonate, 1,000 mg, Oral, Daily PRN  calcium gluconate, 1 g, Intravenous, Once PRN  hydrocortisone, 1 application, Topical, 4x Daily PRN  ibuprofen, 600 mg, Oral, Q6H PRN  ondansetron, 4 mg, Intravenous, Q4H PRN  oxyCODONE, 5 mg, Oral, Q4H PRN  simethicone, 80 mg, Oral, 4x Daily PRN  witch hazel-glycerin, 1 pad, Topical, Q2H PRN        IP CONSULT TO NEONATOLOGY   VS, I/O lung assessments and deep tendon reflexes q 2 hrs while on MGSO 4  Continuous external monitor  Blood sugars FBS and 2 hr PP      Network Utilization Review Department  ATTENTION: Please call with any questions or concerns to [phone] and carefully listen to the prompts so that you are directed to the right person  All voicemails are confidential   Dinorah Danielson all requests for admission clinical reviews, approved or denied determinations and any other requests to dedicated fax number below belonging to the campus where the patient is receiving treatment   List of dedicated fax numbers for the Facilities:  [place] NAME UR FAX NUMBER   ADMISSION DENIALS (Administrative/Medical Necessity) [phone]   [facility] (Maternity/NICU/Pediatrics) [facility] Lynsey Love [address] [place] [address] [address] Al[address] [phone]       Continued Stay Review  Date: 6/28/2021                        Current Patient Class: inpt    Current Level of Care: med surg   HPI:30 y o  female initially admitted inpatient  for severe preeclampsia @ 32 4/7 wks  c/o abdominal pain in epigastric area, nausea (+) edema  BP on admission 188/104, followed by 174/102 and 166/102, patient received management with 20 mg of IV labetalol   Subsequently evidence of blood pressure of 170/102 patient receive 40 mg of labetalol  IV MGSO4 started BTM 12 mg x doses q 12 hrs   Plan monitor lab BP's continuous external monitoring IV antihypertensive medications as needed and supportive care w delivery as clinically necessary    Assessment/Plan:   6/25  32w5d ;  Daily labs  Reviewed with patient will deliver at 34 weeks or earlier if medically indicated  S/P magnesium sulfate and s/p BMZ  Will continue NST BID , Will continue valtrex for HSV suppression  Reports she saw two drops of blood on her underwear yesterday but nothing today  denies h/a, scotomata, ruq pain    Systolic (27EIK), XSO:293 , Min:111 , ZMX:504   Diastolic (54WXB), OIJ:31, Min:71, Max:100Asymptomatic at this time  Last SRBP 0300 6/24/21, last treated with 40mg IV labetalol 0100 21 32w6d admitted with preeclampsia with severe features based on severe range blood pressures, epigastric pain that is now resolved, and P/c 4 2   hospital day 2  Preeclampsia w/ SF  - Bps this afternoon with two severe range blood pressures, sp labetalol 20 and started on Procardia XL 30 mg   - S/p 24 hours of magnesium; plan to restart magnesium when decision is made for delivery  - continues to be asymptomatic  - BTM -  - Labs Q24H: AST/ALT 52/97--> 36/59--> 37/54, CBC WNL; will continue to trend daily labs  - NST BID- Plan for delivery at 34 weeks or sooner if clinically indicated  PM UPDATE: persistently severe range blood pressures requiring treatment  BPs  trend upwards this evening with a maximum of 185/95  She was given another dose of IV labetalol 20 mg with return to a normal range of 130s to 150s over 70s   Per MFM recommendation: betamethasone with slowly uptrending LFTs and now persistent severe range blood pressures, recommendation is to move forward with delivery w IOL       IOL  Start 2021 12:02 am exam Cervix 1/60/-2, received cytotec, a polk balloon and was started IV pitocin titration     AROM'ed for clear at 22 [id number] , delivered viable baby boy 737  DELIVERY NOTE 2021  Procedure: Spontaneous vaginal delivery   Patient then delivered a viable male  at 80  with APGARS 8, 9 at 1 and 5 minutes    Vital Signs:   Date/Time  Temp  Pulse  Resp  BP  SpO2  O2 Device  Cardiac (WDL)  Patient Position - Orthostatic VS   21 1456  97 9 °F (36 6 °C)  91  16  142/93  --  --  --  Lying   21 1315  --  84  16  143/94  97 %  None (Room air)  WDL  Lying   21 1151  --  81  --  140/91  --  --  --  --   21 1136  --  87  --  137/96  --  --  --  --   21 1121  --  86  --  140/98  --  --  --  --   21 1120  --  --  --  --  --  None (Room air)  --  --         Pertinent Labs/Diagnostic Results:       Results from last 7 days   Lab Units 06/29/21  0537 06/28/21  2341 06/28/21  1756 06/28/21  1152 06/28/21  0539 06/23/21  2357   WBC Thousand/uL 18 73* 17 91* 19 83* 17 67* 15 81* 10 88*   HEMOGLOBIN g/dL 9 1* 8 7* 9 3* 9 4* 10 4* 10 6*   HEMATOCRIT % 27 6* 27 0* 27 9* 28 7* 31 9* 31 7*   PLATELETS Thousands/uL 234 228 228 234 266 251   NEUTROS ABS Thousands/µL  --   --   --   --   --  6 81         Results from last 7 days   Lab Units 06/29/21  0537 06/28/21  2341 06/28/21  1756 06/28/21  1152 06/28/21  0539   SODIUM mmol/L 138 138 136 136 135*   POTASSIUM mmol/L 3 7 3 7 3 3* 3 4* 3 8   CHLORIDE mmol/L 105 106 104 103 102   CO2 mmol/L 24 24 23 24 22   ANION GAP mmol/L 9 8 9 9 11   BUN mg/dL 8 9 8 10 9   CREATININE mg/dL 0 60 0 67 0 62 0 61 0 64   EGFR ml/min/1 73sq m 124 119 122 123 121   CALCIUM mg/dL 7 0* 7 0* 6 9* 6 8* 7 2*     Results from last 7 days   Lab Units 06/29/21  0537 06/28/21  2341 06/28/21  1756 06/28/21  1152 06/28/21  0539   AST U/L 41 43 56* 56* 64*   ALT U/L 127* 135* 153* 151* 162*   ALK PHOS U/L 178* 179* 192* 185* 213*   TOTAL PROTEIN g/dL 6 1* 6 0* 6 1* 6 1* 6 8   ALBUMIN g/dL 1 9* 1 9* 1 9* 1 9* 2 2*   TOTAL BILIRUBIN mg/dL 0 17* 0 14* 0 18* 0 25 0 26     Results from last 7 days   Lab Units 06/26/21 2129 06/26/21  1758 06/26/21  1521 06/26/21  1139 06/26/21  0636 06/25/21  2101 06/25/21  1607 06/25/21  1029 06/25/21  0606 06/24/21  1857 06/24/21  1642 06/24/21  1514   POC GLUCOSE mg/dl 74 95 74 65 91 172* 77 225* 136 119 115 118     Results from last 7 days   Lab Units 06/29/21  0537 06/28/21  2341 06/28/21  1756 06/28/21  1152 06/28/21  0539 06/27/21  2356 06/27/21  1754 06/27/21  1211 06/27/21  0552 06/26/21  2302 06/26/21  0554 06/25/21  0612   GLUCOSE RANDOM mg/dL 83 82 111 108 84 85 105 85 86 75 95 121       Results from last 7 days   Lab Units 06/23/21  2357   CLARITY UA  Slightly Cloudy   COLOR UA  Yellow   SPEC GRAV UA  1 010   PH UA  6 5   GLUCOSE UA mg/dl Negative   KETONES UA mg/dl Negative   BLOOD UA  Small* PROTEIN UA mg/dl 100 (2+)*   NITRITE UA  Negative   BILIRUBIN UA  Negative   UROBILINOGEN UA E U /dl 0 2   LEUKOCYTES UA  Large*   WBC UA /hpf Innumerable*   RBC UA /hpf 2-4   BACTERIA UA /hpf Occasional   EPITHELIAL CELLS WET PREP /hpf Occasional   CREATININE UR mg/dL 30 3   PROTEIN UR mg/dL 128   PROT/CREAT RATIO UR  4 22*           Results from last 7 days   Lab Units 06/23/21  2357   URINE CULTURE  <10,000 cfu/ml Beta Hemolytic Streptococcus Group B*  <10,000 cfu/ml Candida sp  presumptively albicans*  80,000-89,000 cfu/ml    Medications:   Scheduled Medications:  docusate sodium, 100 mg, Oral, BID  measles-mumps-rubella, 0 5 mL, Subcutaneous, Once  NIFEdipine, 30 mg, Oral, Daily  senna, 1 tablet, Oral, Daily  valACYclovir, 500 mg, Oral, Daily    Continuous IV Infusions:  magnesium sulfate, 2 g/hr, Intravenous, Continuous  sodium chloride, 25 mL/hr, Intravenous, Continuous  IV  oxytocin (PITOCIN) 30 Units in lactated ringers 500 mL infusion 6/27 1147 & DC 6/28 0806  Rate: 1-30 mL/hr Dose: 1-30 jude-units/min  Freq: Titrated Route: IV  Last Dose: Stopped (06/28/21 1228    PRN Meds:  acetaminophen, 650 mg, Oral, Q6H PRN  aluminum-magnesium hydroxide-simethicone, 15 mL, Oral, Q6H PRN  benzocaine-menthol-lanolin-aloe, , Topical, 4x Daily PRN  calcium carbonate, 1,000 mg, Oral, Daily PRN  calcium gluconate, 1 g, Intravenous, Once PRN  hydrocortisone, 1 application, Topical, 4x Daily PRN  ibuprofen, 600 mg, Oral, Q6H PRN  ondansetron, 4 mg, Intravenous, Q4H PRN  oxyCODONE, 5 mg, Oral, Q4H PRN  simethicone, 80 mg, Oral, 4x Daily PRN  witch hazel-glycerin, 1 pad, Topical, Q2H PRN        Discharge Plan: home when medically stable   Network Utilization Review Department  ATTENTION: Please call with any questions or concerns to [phone] and carefully listen to the prompts so that you are directed to the right person   All voicemails are confidential   Maria Isabel Gupta all requests for admission clinical reviews, approved or denied determinations and any other requests to dedicated fax number below belonging to the campus where the patient is receiving treatment   List of dedicated fax numbers for the Facilities:  [address] [place] [address] DENIALS (Administrative/Medical Necessity) [phone]   [address] (Maternity/NICU/Pediatrics) [phone]   [address] [place] [address] [address] [place] [address] [address] Alh[address] [facility] [place] [address] [phone]

## 2021-06-29 NOTE — PLAN OF CARE
Problem: PAIN - ADULT  Goal: Verbalizes/displays adequate comfort level or baseline comfort level  Description: Interventions:  - Encourage patient to monitor pain and request assistance  - Assess pain using appropriate pain scale  - Administer analgesics based on type and severity of pain and evaluate response  - Implement non-pharmacological measures as appropriate and evaluate response  - Consider cultural and social influences on pain and pain management  - Notify physician/advanced practitioner if interventions unsuccessful or patient reports new pain  Outcome: Progressing     Problem: INFECTION - ADULT  Goal: Absence or prevention of progression during hospitalization  Description: INTERVENTIONS:  - Assess and monitor for signs and symptoms of infection  - Monitor lab/diagnostic results  - Monitor all insertion sites, i e  indwelling lines, tubes, and drains  - Monitor endotracheal if appropriate and nasal secretions for changes in amount and color  - [place] appropriate cooling/warming therapies per order  - Administer medications as ordered  - Instruct and encourage patient and family to use good hand hygiene technique  - Identify and instruct in appropriate isolation precautions for identified infection/condition  Outcome: Progressing     Problem: SAFETY ADULT  Goal: Patient will remain free of falls  Description: INTERVENTIONS:  - Keep Call bell within reach  - Keep bed low and locked with side rails adjusted as appropriate  - Keep care items and personal belongings within reach  - Initiate and maintain comfort rounds    Outcome: Progressing     Problem: DISCHARGE PLANNING  Goal: Discharge to home or other facility with appropriate resources  Description: INTERVENTIONS:  - Identify barriers to discharge w/patient and caregiver  - Arrange for needed discharge resources and transportation as appropriate  - Identify discharge learning needs (meds, wound care, etc )  - Arrange for interpretive services to assist at discharge as needed  - Refer to Case Management Department for coordinating discharge planning if the patient needs post-hospital services based on physician/advanced practitioner order or complex needs related to functional status, cognitive ability, or social support system  Outcome: Progressing     Problem: POSTPARTUM  Goal: Experiences normal postpartum course  Description: INTERVENTIONS:  - Monitor maternal vital signs  - Assess uterine involution and lochia  Outcome: Progressing  Goal: Appropriate maternal -  bonding  Description: INTERVENTIONS:  - Identify family support  - Assess for appropriate maternal/infant bonding   -Encourage maternal/infant bonding opportunities  - Referral to  or  as needed  Outcome: Progressing  Goal: Establishment of infant feeding pattern  Description: INTERVENTIONS:  - Assess breast/bottle feeding  - Refer to lactation as needed  Outcome: Progressing  Goal: Incision(s), wounds(s) or drain site(s) healing without S/S of infection  Description: INTERVENTIONS  - Assess and document dressing, incision, wound bed, drain sites and surrounding tissue  - Provide patient and family education  Outcome: Progressing

## 2021-06-29 NOTE — QUICK NOTE
Patient is completing now 24 hours of magnesium infusion in setting of preeclampsia with severe features  Blood pressures with good control,  no evidence of new severe range blood pressures  Patient is asymptomatic  At this time there is no signs of magnesium toxicity  Will discontinue orders now  Will continue checking labs q 12 hours    Continue Procardia XL 30 mg daily and close follow-up of blood pressures    Patient discussed with Dr Beulah Ames MD

## 2021-06-29 NOTE — PROGRESS NOTES
Progress Note - OB/GYN   Jannet Oreilly 35 y o  female MRN: [id number]  Unit/Bed#: L&D 312-01 Encounter: [id number]    Assessment:  Post partum Day #1  S/p , stable, baby in NICU  Pre Term pregnancy at 35 weeks   Single male viable fetus    Pregnancy complicated by :  1) preeclampsia with severe features:  Status post labetalol 20 mg 40 mg and 20 mg currently on magnesium infusion (to be discontinued this morning after 24 hours) and on Procardia 30 mg XL daily  Patient is asymptomatic  Current blood pressures 130s to 150s over 80s to 90s  AST ALT are trending down 41/127 from 43/135, creatinine is stable at 0 6, hemoglobin 9 1 platelets 383   2) Rubella nonimmune MMR has been order    Plan:  Discontinue magnesium infusion around 8:00 a m  Continue routine post partum care  Encourage ambulation  Encourage breastfeeding  Anticipate discharge     Subjective/Objective   Chief Complaint:     Post delivery  Patient is doing well  Lochia WNL  Pain well controlled       Subjective:     Pain: yes, cramping, improved with meds  Tolerating PO: yes  Voiding: yes  Flatus: yes  BM: no  Ambulating: yes  Chest pain: no  Shortness of breath: no  Leg pain: no  Lochia: minimal    Objective:     Vitals: /91 (BP Location: Left arm)   Pulse 78   Temp 98 1 °F (36 7 °C) (Temporal)   Resp 16   Ht 5' (1 524 m)   Wt 71 7 kg (158 lb)   LMP 2020 (Exact Date)   SpO2 98%   Breastfeeding Yes   BMI 30 86 kg/m²       Intake/Output Summary (Last 24 hours) at 2021 0624  Last data filed at 2021 0105  Gross per 24 hour   Intake 2665 83 ml   Output 4525 ml   Net -1859 17 ml       Lab Results   Component Value Date    WBC 18 73 (H) 2021    HGB 9 1 (L) 2021    HCT 27 6 (L) 2021    MCV 84 2021     2021       Physical Exam:     Gen: AAOx3, NAD  CV: RRR  Lungs: CTA b/l  Abd: Soft, non-tender, non-distended, no rebound or guarding  Uterine fundus firm and non-tender, 1 cm below the umbilicus     Ext: Non tender    Su Kanner, MD  6/29/2021  6:24 AM

## 2021-06-29 NOTE — PLAN OF CARE
Problem: PAIN - ADULT  Goal: Verbalizes/displays adequate comfort level or baseline comfort level  Description: Interventions:  - Encourage patient to monitor pain and request assistance  - Assess pain using appropriate pain scale  - Administer analgesics based on type and severity of pain and evaluate response  - Implement non-pharmacological measures as appropriate and evaluate response  - Consider cultural and social influences on pain and pain management  - Notify physician/advanced practitioner if interventions unsuccessful or patient reports new pain  Outcome: Progressing     Problem: INFECTION - ADULT  Goal: Absence or prevention of progression during hospitalization  Description: INTERVENTIONS:  - Assess and monitor for signs and symptoms of infection  - Monitor lab/diagnostic results  - Monitor all insertion sites, i e  indwelling lines, tubes, and drains  - Monitor endotracheal if appropriate and nasal secretions for changes in amount and color  - [place] appropriate cooling/warming therapies per order  - Administer medications as ordered  - Instruct and encourage patient and family to use good hand hygiene technique  - Identify and instruct in appropriate isolation precautions for identified infection/condition  Outcome: Progressing     Problem: SAFETY ADULT  Goal: Patient will remain free of falls  Description: INTERVENTIONS:  - Keep Call bell within reach  - Keep bed low and locked with side rails adjusted as appropriate  - Keep care items and personal belongings within reach  - Initiate and maintain comfort rounds    Outcome: Progressing     Problem: DISCHARGE PLANNING  Goal: Discharge to home or other facility with appropriate resources  Description: INTERVENTIONS:  - Identify barriers to discharge w/patient and caregiver  - Arrange for needed discharge resources and transportation as appropriate  - Identify discharge learning needs (meds, wound care, etc )  - Arrange for interpretive services to assist at discharge as needed  - Refer to Case Management Department for coordinating discharge planning if the patient needs post-hospital services based on physician/advanced practitioner order or complex needs related to functional status, cognitive ability, or social support system  Outcome: Progressing     Problem: POSTPARTUM  Goal: Experiences normal postpartum course  Description: INTERVENTIONS:  - Monitor maternal vital signs  - Assess uterine involution and lochia  Outcome: Progressing  Goal: Appropriate maternal -  bonding  Description: INTERVENTIONS:  - Identify family support  - Assess for appropriate maternal/infant bonding   -Encourage maternal/infant bonding opportunities  - Referral to  or  as needed  Outcome: Progressing  Goal: Establishment of infant feeding pattern  Description: INTERVENTIONS:  - Assess breast/bottle feeding  - Refer to lactation as needed  Outcome: Progressing  Goal: Incision(s), wounds(s) or drain site(s) healing without S/S of infection  Description: INTERVENTIONS  - Assess and document dressing, incision, wound bed, drain sites and surrounding tissue  - Provide patient and family education  Outcome: Progressing

## 2021-06-30 VITALS
BODY MASS INDEX: 31.02 KG/M2 | OXYGEN SATURATION: 98 % | DIASTOLIC BLOOD PRESSURE: 91 MMHG | HEART RATE: 84 BPM | HEIGHT: 60 IN | SYSTOLIC BLOOD PRESSURE: 124 MMHG | RESPIRATION RATE: 18 BRPM | WEIGHT: 158 LBS | TEMPERATURE: 97.9 F

## 2021-06-30 LAB
ALBUMIN SERPL BCP-MCNC: 1.8 G/DL (ref 3.5–5)
ALP SERPL-CCNC: 148 U/L (ref 46–116)
ALT SERPL W P-5'-P-CCNC: 92 U/L (ref 12–78)
ANION GAP SERPL CALCULATED.3IONS-SCNC: 8 MMOL/L (ref 4–13)
AST SERPL W P-5'-P-CCNC: 30 U/L (ref 5–45)
BILIRUB SERPL-MCNC: 0.1 MG/DL (ref 0.2–1)
BUN SERPL-MCNC: 6 MG/DL (ref 5–25)
CALCIUM ALBUM COR SERPL-MCNC: 9.2 MG/DL (ref 8.3–10.1)
CALCIUM SERPL-MCNC: 7.4 MG/DL (ref 8.3–10.1)
CHLORIDE SERPL-SCNC: 107 MMOL/L (ref 100–108)
CO2 SERPL-SCNC: 24 MMOL/L (ref 21–32)
CREAT SERPL-MCNC: 0.54 MG/DL (ref 0.6–1.3)
ERYTHROCYTE [DISTWIDTH] IN BLOOD BY AUTOMATED COUNT: 14.3 % (ref 11.6–15.1)
GFR SERPL CREATININE-BSD FRML MDRD: 128 ML/MIN/1.73SQ M
GLUCOSE SERPL-MCNC: 74 MG/DL (ref 65–140)
HCT VFR BLD AUTO: 26.8 % (ref 34.8–46.1)
HGB BLD-MCNC: 8.7 G/DL (ref 11.5–15.4)
MCH RBC QN AUTO: 27.9 PG (ref 26.8–34.3)
MCHC RBC AUTO-ENTMCNC: 32.5 G/DL (ref 31.4–37.4)
MCV RBC AUTO: 86 FL (ref 82–98)
PLATELET # BLD AUTO: 239 THOUSANDS/UL (ref 149–390)
PMV BLD AUTO: 11.3 FL (ref 8.9–12.7)
POTASSIUM SERPL-SCNC: 4 MMOL/L (ref 3.5–5.3)
PROT SERPL-MCNC: 5.8 G/DL (ref 6.4–8.2)
RBC # BLD AUTO: 3.12 MILLION/UL (ref 3.81–5.12)
SODIUM SERPL-SCNC: 139 MMOL/L (ref 136–145)
WBC # BLD AUTO: 14.29 THOUSAND/UL (ref 4.31–10.16)

## 2021-06-30 PROCEDURE — 90707 MMR VACCINE SC: CPT | Performed by: OBSTETRICS & GYNECOLOGY

## 2021-06-30 PROCEDURE — 80053 COMPREHEN METABOLIC PANEL: CPT | Performed by: OBSTETRICS & GYNECOLOGY

## 2021-06-30 PROCEDURE — 85027 COMPLETE CBC AUTOMATED: CPT | Performed by: OBSTETRICS & GYNECOLOGY

## 2021-06-30 PROCEDURE — 99024 POSTOP FOLLOW-UP VISIT: CPT | Performed by: OBSTETRICS & GYNECOLOGY

## 2021-06-30 RX ORDER — IBUPROFEN 600 MG/1
600 TABLET ORAL EVERY 6 HOURS PRN
Qty: 30 TABLET | Refills: 0
Start: 2021-06-30 | End: 2021-08-20

## 2021-06-30 RX ORDER — NIFEDIPINE 30 MG/1
30 TABLET, FILM COATED, EXTENDED RELEASE ORAL DAILY
Qty: 30 TABLET | Refills: 1 | Status: SHIPPED | OUTPATIENT
Start: 2021-06-30 | End: 2021-08-20

## 2021-06-30 RX ORDER — ACETAMINOPHEN 325 MG/1
650 TABLET ORAL EVERY 6 HOURS PRN
Refills: 0
Start: 2021-06-30 | End: 2022-07-14

## 2021-06-30 RX ORDER — DOCUSATE SODIUM 100 MG/1
100 CAPSULE, LIQUID FILLED ORAL 2 TIMES DAILY
Qty: 10 CAPSULE | Refills: 0
Start: 2021-06-30 | End: 2021-08-20

## 2021-06-30 RX ADMIN — NIFEDIPINE 30 MG: 30 TABLET, FILM COATED, EXTENDED RELEASE ORAL at 08:09

## 2021-06-30 RX ADMIN — MEASLES, MUMPS, AND RUBELLA VIRUS VACCINE LIVE 0.5 ML: 1000; 12500; 1000 INJECTION, POWDER, LYOPHILIZED, FOR SUSPENSION SUBCUTANEOUS at 08:15

## 2021-06-30 RX ADMIN — VALACYCLOVIR HYDROCHLORIDE 500 MG: 500 TABLET, FILM COATED ORAL at 08:09

## 2021-06-30 RX ADMIN — DOCUSATE SODIUM 100 MG: 100 CAPSULE ORAL at 08:09

## 2021-06-30 RX ADMIN — SENNOSIDES 8.6 MG: 8.6 TABLET ORAL at 08:08

## 2021-06-30 NOTE — PLAN OF CARE
Problem: PAIN - ADULT  Goal: Verbalizes/displays adequate comfort level or baseline comfort level  Description: Interventions:  - Encourage patient to monitor pain and request assistance  - Assess pain using appropriate pain scale  - Administer analgesics based on type and severity of pain and evaluate response  - Implement non-pharmacological measures as appropriate and evaluate response  - Consider cultural and social influences on pain and pain management  - Notify physician/advanced practitioner if interventions unsuccessful or patient reports new pain  Outcome: Progressing     Problem: INFECTION - ADULT  Goal: Absence or prevention of progression during hospitalization  Description: INTERVENTIONS:  - Assess and monitor for signs and symptoms of infection  - Monitor lab/diagnostic results  - Monitor all insertion sites, i e  indwelling lines, tubes, and drains  - Monitor endotracheal if appropriate and nasal secretions for changes in amount and color  - [place] appropriate cooling/warming therapies per order  - Administer medications as ordered  - Instruct and encourage patient and family to use good hand hygiene technique  - Identify and instruct in appropriate isolation precautions for identified infection/condition  Outcome: Progressing     Problem: SAFETY ADULT  Goal: Patient will remain free of falls  Description: INTERVENTIONS:  - Keep Call bell within reach  - Keep bed low and locked with side rails adjusted as appropriate  - Keep care items and personal belongings within reach  - Initiate and maintain comfort rounds    Outcome: Progressing     Problem: DISCHARGE PLANNING  Goal: Discharge to home or other facility with appropriate resources  Description: INTERVENTIONS:  - Identify barriers to discharge w/patient and caregiver  - Arrange for needed discharge resources and transportation as appropriate  - Identify discharge learning needs (meds, wound care, etc )  - Arrange for interpretive services to assist at discharge as needed  - Refer to Case Management Department for coordinating discharge planning if the patient needs post-hospital services based on physician/advanced practitioner order or complex needs related to functional status, cognitive ability, or social support system  Outcome: Progressing     Problem: POSTPARTUM  Goal: Experiences normal postpartum course  Description: INTERVENTIONS:  - Monitor maternal vital signs  - Assess uterine involution and lochia  Outcome: Progressing  Goal: Appropriate maternal -  bonding  Description: INTERVENTIONS:  - Identify family support  - Assess for appropriate maternal/infant bonding   -Encourage maternal/infant bonding opportunities  - Referral to  or  as needed  Outcome: Progressing  Goal: Establishment of infant feeding pattern  Description: INTERVENTIONS:  - Assess breast/bottle feeding  - Refer to lactation as needed  Outcome: Progressing  Goal: Incision(s), wounds(s) or drain site(s) healing without S/S of infection  Description: INTERVENTIONS  - Assess and document dressing, incision, wound bed, drain sites and surrounding tissue  - Provide patient and family education  Outcome: Progressing

## 2021-06-30 NOTE — UTILIZATION REVIEW
Notification of Discharge   This is a Notification of Discharge from our facility [address]  Please be advised that this patient has been discharge from our facility  Below you will find the admission and discharge date and time including the patients disposition  UTILIZATION REVIEW CONTACT:  Lisandra Maxwell  Utilization   Network Utilization Review Department  Phone: [phone] x carefully listen to the prompts  All voicemails are confidential   Email: [email or web address]     PHYSICIAN ADVISORY SERVICES:  FOR [id number] REVIEW - MEDICAL NECESSITY DENIAL  Phone: [phone]  Fax: [phone]  Email: [email or web address]     PRESENTATION DATE: 6/23/2021 10:48 PM  OBERVATION ADMISSION DATE:  INPATIENT ADMISSION DATE: 6/24/21 12:29 AM   DISCHARGE DATE: 6/30/2021  3:16 PM  DISPOSITION: Home/Self Care Home/Self Care      IMPORTANT INFORMATION:  Send all requests for admission clinical reviews, approved or denied determinations and any other requests to dedicated fax number below belonging to the campus where the patient is receiving treatment   List of dedicated fax numbers:  [address] [place] [address] DENIALS (Administrative/Medical Necessity) [phone]   [address]  (Maternity/NICU/Pediatrics) [phone]   Fahad Collado [phone]   [facility] [phone]   [facility] [phone]   [address] [phone]   [facility]  [phone]   [address] [facility]  [address] [address] [facility] [phone]

## 2021-06-30 NOTE — PROGRESS NOTES
Progress Note - OB/GYN   Doris Denton 35 y o  female MRN: [id number]  Unit/Bed#: L&D 312-01 Encounter: [id number]    Assessment:  Post partum Day #2 s/p  at 35 weeks for preeclampsia with severe features, stable, baby in the NICU, doing well     Plan:    1) Preeclampsia with severe features  - Continues on Procardia XL 30 mg daily, Bps have ranged 136-142/87-96 the last 24 hours  - Patient is asymptomatic  - AST/ALT has been trending down, highest levels 82/178 and yesterday 41/127--> 30/92 this AM  - Platelets and creatinine have been normal     2) Rubella NI  - MMR ordered    3) Continue routine post partum care  - Encourage ambulation  - Encourage breastfeeding  - Anticipate discharge today      Subjective/Objective   Chief Complaint:     Post delivery  Patient is doing well  Lochia WNL  Pain well controlled  Subjective:     Pain: yes, cramping, improved with meds  Tolerating PO: yes  Voiding: yes  Ambulating: yes  Chest pain: no  Shortness of breath: no  Leg pain: no  Lochia: minimal    Objective:     Vitals: /87 (BP Location: Right arm)   Pulse 83   Temp 98 °F (36 7 °C) (Temporal)   Resp 18   Ht 5' (1 524 m)   Wt 71 7 kg (158 lb)   LMP 2020 (Exact Date)   SpO2 99%   Breastfeeding Yes   BMI 30 86 kg/m²       Intake/Output Summary (Last 24 hours) at 2021 0742  Last data filed at 2021 0807  Gross per 24 hour   Intake --   Output 800 ml   Net -800 ml       Lab Results   Component Value Date    WBC 14 29 (H) 2021    HGB 8 7 (L) 2021    HCT 26 8 (L) 2021    MCV 86 2021     2021       Physical Exam:     Gen: AAOx3, NAD  Abd: Soft, non-tender, non-distended, no rebound or guarding  Uterine fundus firm and non-tender, 3 cm below the umbilicus     Ext: Non tender    Karla Bull MD  2021  7:42 AM

## 2021-06-30 NOTE — LACTATION NOTE
Follow up consult as requested by mom  Parents had questions about hand expression and pumping  I went over the Ready,set, baby booklet, the discharge booklet, and gave and reviewed the NICU packet

## 2021-07-12 ENCOUNTER — TELEPHONE (OUTPATIENT)
Dept: POSTPARTUM | Facility: CLINIC | Age: 31
End: 2021-07-12

## 2021-07-22 ENCOUNTER — OFFICE VISIT (OUTPATIENT)
Dept: POSTPARTUM | Facility: CLINIC | Age: 31
End: 2021-07-22
Payer: COMMERCIAL

## 2021-07-22 VITALS — DIASTOLIC BLOOD PRESSURE: 86 MMHG | SYSTOLIC BLOOD PRESSURE: 124 MMHG

## 2021-07-22 DIAGNOSIS — Z71.89 ENCOUNTER FOR BREAST FEEDING COUNSELING: Primary | ICD-10-CM

## 2021-07-22 PROCEDURE — 99404 PREV MED CNSL INDIV APPRX 60: CPT | Performed by: PEDIATRICS

## 2021-07-22 NOTE — PROGRESS NOTES
INITIAL BREAST FEEDING EVALUATION    Informant/Relationship: Amanda Kerr    Discussion of General Lactation Issues: Harry Weinberg was born early due to [facility] preeclampsia  He was in the NICU for 12 days  She is feeding fortified expressed milk and formula when she is unable to express all of the milk that Rooney needs  He is also latching at every feeding  Amanda Kerr notices that his tongue elevates prior to latch on the breast or the bottle  Infant is 4 weeks old today    Born at 35 1/7 weeks       History:  Fertility Problem:no  Breast changes:yes - breasts were larger  : yes - induced due to preeclampsia  Full term:No 35 1/7 weeks   labor:no  First nursing/attempt < 1 hour after birth:no  Skin to skin following delivery:no  Breast changes after delivery:yes - breasts were full and saw mature milk by day 3  Rooming in (infant in room with mother with exception of procedures, eg  Circumcision: no  Blood sugar issues:no  NICU stay:yes - for 12 days  Jaundice:yes - while in the NICU  Phototherapy:yes - while in the NICU  Supplement given: (list supplement and method used as well as reason(s):yes - donor milk and expressed milk via NGT initially and then bottle    Past Medical History:   Diagnosis Date    Abnormal Pap smear of cervix     Environmental allergies     Seasonal allergies     Severe pre-eclampsia in third trimester 2021    Varicella          Current Outpatient Medications:     acetaminophen (TYLENOL) 325 mg tablet, Take 2 tablets (650 mg total) by mouth every 6 (six) hours as needed for mild pain, headaches or fever, Disp: , Rfl: 0    Blood Glucose Monitoring Suppl (Contour Next EZ) w/Device KIT, Use 1 kit 4 (four) times a day, Disp: 1 kit, Rfl: 0    docusate sodium (COLACE) 100 mg capsule, Take 1 capsule (100 mg total) by mouth 2 (two) times a day, Disp: 10 capsule, Rfl: 0    ibuprofen (MOTRIN) 600 mg tablet, Take 1 tablet (600 mg total) by mouth every 6 (six) hours as needed (Cramping), Disp: 30 tablet, Rfl: 0    Microlet Lancets MISC, Use 4 (four) times a day, Disp: 100 each, Rfl: 0    NIFEdipine (ADALAT CC) 30 MG 24 hr tablet, Take 1 tablet (30 mg total) by mouth daily, Disp: 30 tablet, Rfl: 1    Prenatal MV-Min-Fe Fum-FA-DHA (PRENATAL 1 PO), Take by mouth, Disp: , Rfl:     Allergies   Allergen Reactions    Penicillins Rash       Social History     Substance and Sexual Activity   Drug Use Never       Social History Never a smoker    Interval Breastfeeding History:    Frequency of breast feeding: Every 3 hours or sooner  Does mother feel breastfeeding is effective: Yes  Does infant appear satisfied after nursing:No  Stooling pattern normal: Yes  Urinating frequently:Yes  Using shield or shells: No    Alternative/Artificial Feedings:   Bottle: Yes, to supplement after nursing  Cup: No  Syringe/Finger: No           Formula Type: [place] powder to fortify expressed milk  Amount: 1tsp per 90ml expressed milk            Breast Milk:                      Amount: 50ml after nursing            Frequency Q 3 Hr between feedings  Elimination Problems: No      Equipment:  Nipple Shield             Type: none             Size: n/a             Frequency of Use: n/a  Pump            Type: Medela Max Flow            Frequency of Use: Every 3 hours  Expresses about 1 ounce per session  Shells            Type: none            Frequency of use: n/a    Equipment Problems: no    Mom:  Breast: Small symmetrical widely spaced breasts  Nipple Assessment in General: Very long everted nipples and tiny areola  Mother's Awareness of Feeding Cues                 Recognizes:  Yes                  Verbalizes: Yes  Support System: FOB, extended family  History of Breastfeeding: none  Changes/Stressors/Violence: Jean Andrade is concerned about her milk supply  Concerns/Goals: Jean Andrade desires to feed Xiang Less only her milk    Problems with Mom: low milk supply    Physical Exam  Constitutional: Appearance: Normal appearance  HENT:      Head: Normocephalic and atraumatic  Cardiovascular:      Rate and Rhythm: Normal rate and regular rhythm  Pulses: Normal pulses  Heart sounds: Normal heart sounds  Pulmonary:      Effort: Pulmonary effort is normal       Breath sounds: Normal breath sounds  Musculoskeletal:         General: No swelling  Normal range of motion  Cervical back: Normal range of motion and neck supple  Neurological:      Mental Status: She is alert and oriented to person, place, and time  Skin:     General: Skin is warm and dry  Psychiatric:         Mood and Affect: Mood normal          Behavior: Behavior normal          Thought Content: Thought content normal          Judgment: Judgment normal          Infant:  Behaviors: Alert  Color: Pink  Birth weight: 1950gram  Current weight: 2650gram    Problems with infant: LPTI, requires supplementation      General Appearance:  Alert, active, no distress                             Head:  Normocephalic, AFOF, sutures opposed                             Eyes:  Conjunctiva clear, no drainage                              Ears:  Normally placed, no anomolies                             Nose:  no drainage or erythema                           Mouth:  No lesions  Tongue extends, lateralizes and elevates well  Very little effort to suck on my finger during exam   No obstruction felt when sweeping my finger under the tongue                    Neck:  Supple, symmetrical, trachea midline                 Respiratory:  No grunting, flaring, retractions, breath sounds clear and equal            Cardiovascular:  Regular rate and rhythm  No murmur  Adequate perfusion/capillary refill   Femoral pulse present                    Abdomen:   Soft, non-tender, no masses, bowel sounds present, no HSM             Genitourinary:  Normal male, testes descended, no discharge, swelling, or pain, anus patent                          Spine:   No abnormalities noted        Musculoskeletal:  Full range of motion          Skin/Hair/Nails:   Skin warm, dry, and intact, no rashes or abnormal dyspigmentation or lesions                Neurologic:   No abnormal movement, tone appropriate for gestational age    [place] Latch:  Efficiency:               Lips Flanged: Yes              Depth of latch: deep              Audible Swallow: Yes, a few suckling bursts noted              Visible Milk: Yes              Wide Open/ Asymmetrical: Yes              Suck Swallow Cycle: Breathing: unlabored, Coordinated: yes  Nipple Assessment after latch: Normal: elongated/eraser, no discoloration and no damage noted  Latch Problems: None  With good positioning, Nevin latched effectively and nursed very well  He fed on both breasts and was content after feeding  He was not supplemented   Position:  Infant's Ergonomics/Body               Body Alignment: Yes, after education               Head Supported: Yes               Close to Mom's body/ Lifted/ Supported: Yes, after education               Mom's Ergonomics/Body: Yes, after education                           Supported: Yes, after education                           Sitting Back: Yes, after education                           Brings Baby to her breast: Yes, after education  Positioning Problems: Initially, Chacorta Larsen leaned over Cammie and attempted to push her nipple into his mouth  His body was away from hers and neither of them appeared comfortable and latch was shallow, just on the nipple    Chacorta Larsen reports this is what she was taught by NICU staff that Mc[place]ough should not be held close to the breast due to his size for concerns with breathing      Handouts:   Paced bottle feeding, Hands on pumping, Hand expression, Increasing your supply and Latch Check List    Education:  Reviewed Latch: Demonstrated how to align the baby so that his nose is just above the nipple with his lower lip and chin touching the breast to encourage the deepest, widest, off-center latch  Reviewed Positioning for Dyad: Demonstrated how to position mom fully supported and comfortable first and then position baby "belly to belly" with mom  Reviewed Frequency/Supply & Demand: Discussed how milk supply is established and maintained  Reviewed Alternative/Artificial Feedings: Discussed paced bottle feeding  Reviewed Equipment: Discussed and demonstrated the use and features of the Medela Pump in Style and the elements of hands on pumping  Plan:  Plan for breastfeeding    Reassurance and support given  Reviewed normal sucking patterns: transition from stimulation to nutritive to release or non-nutritive  Chloé Kumar was taught how to determine when Burdette Sandifer is "drinking" and when he is doing non-nutritive sucking  I suggested gentle breast compression as needed to increase flow to help Burdette Sandifer get more milk  Demonstrated position to hold infant (state which ones)  Chloé Kumar was taught laid back positioning with a baby led latch  She and Burdette Sandifer did very well with this positionig  They both appeared very comfortable and Rooney fed remarkably well  Discussed difference in sensation of non-nutritive v nutritive sucking  Use of pump demonstrated  Chloé Kumar was taught effective hands on pumping and how to use her pump    Chloé Kumar desires to transition to EBF as able  I encouraged her to feed at the breast first and only supplement if Burdette Sandifer is still hungry after nursing  I suggested that she speak with Peds for directions on removing fortification of her milk or supplemental bottles  A follow up appointment was scheduled to check progress  I have spent 90 minutes with Patient and family today in which greater than 50% of this time was spent in counseling/coordination of care regarding Patient and family education

## 2021-07-22 NOTE — PATIENT INSTRUCTIONS
Continue to feed Rooney at least every 3 hours  Pay close attention to positioning for a deeper latch  Refer to the instructional video "Attaching Your Baby at the Breast" on the [address] [place] [address] website for further review  Gently compress the breast as needed to increase milk flow as Piyush Lugo begins to slow  Switch breasts when he is no longer actively drinking  Allow him to feed until he is content on the second breast    Feed expressed milk or formula as needed if Piyush Lugo still appears hungry after nursing or as advised by Peds  Paced bottle feeding technique is less stressful for your baby, prevents overfeeding and protects the breastfeeding relationship  You can find a video about paced bottle feeding at www lacted  org  Pump immediately after feeding as desired to obtain milk for supplement  Pump whenever a feeding at the breast is missed as well  When pumping, cycle your pump through stimulation and expression mode several times in a session to stimulate several let downs  Use hands on pumping and hand expression to increase your output  Maintain your pump as recommended  Use flange that fits comfortably and allows the breast to empty effectively  Follow up as scheduled  Please call with any questions or concerns

## 2021-07-23 NOTE — PROGRESS NOTES
I have reviewed the notes, assessments, and/or procedures performed by Ashley Wilder RN, IBCLC, I concur with her/his documentation of Shaan Dubois MD 07/22/21

## 2021-08-03 ENCOUNTER — OFFICE VISIT (OUTPATIENT)
Dept: POSTPARTUM | Facility: CLINIC | Age: 31
End: 2021-08-03
Payer: COMMERCIAL

## 2021-08-03 DIAGNOSIS — Z71.89 ENCOUNTER FOR BREAST FEEDING COUNSELING: Primary | ICD-10-CM

## 2021-08-03 PROCEDURE — 99404 PREV MED CNSL INDIV APPRX 60: CPT | Performed by: PEDIATRICS

## 2021-08-03 NOTE — PATIENT INSTRUCTIONS
Piyush Lugo is growing beautifully! Continue to feed on demand  Pump as desired  Feed expressed milk as desired  Please call with any questions or concerns

## 2021-08-03 NOTE — PROGRESS NOTES
BREAST FEEDING FOLLOW UP VISIT    Informant/Relationship: Oniel Mendoza and FOCHARLA, Will    Discussion of General Lactation Issues: Oniel Mendoza feels that breastfeeding is getting established  Infant is 10 weeks old today  Interval Breastfeeding History:    Frequency of breast feeding: Every feeding but twice a day  Does mother feel breastfeeding is effective: Yes  Does infant appear satisfied after nursing:Yes  Stooling pattern normal:Yes  Urinating frequently:Yes  Using shield or shells:No    Alternative/Artificial Feedings:   Bottle: Yes, twice a day instead of nursing and occasionally to supplement  Cup: No  Syringe/Finger: No           Formula Type: Neosure powder to fortify expressed milk                     Amount: 1 tsp per 90ml of milk  Drinks 1 5-3 ounces per feeding            Breast Milk:                      Amount: 1 5-3 ounces            Frequency Q 1-3 Hr between feedings  Elimination Problems: No      Equipment:  Nipple Shield             Type: none             Size: n/a             Frequency of Use: n/a  Pump            Type: Medela Max Flow            Frequency of Use: 3 times a day  Expresses 1 5-2 ounces per session  Pumps after nursing  Shells            Type: none            Frequency of use: n/a    Equipment Problems: no      Mom:  Breast: Small symmetrical widely spaced breasts  Nipple Assessment in General: Very long everted nipples and tiny areola  Mother's Awareness of Feeding Cues                 Recognizes: Yes                  Verbalizes: Yes  Support System: FOB, extended family  History of Breastfeeding: none  Changes/Stressors/Violence: Oniel Mendoza is feeling much more relaxed and confident  Concerns/Goals: Oniel Mendoza desires to feed [employer] [employer] only her milk     Problems with Mom: low milk supply     Physical Exam  Constitutional:       Appearance: Normal appearance  HENT:      Head: Normocephalic and atraumatic  Cardiovascular:      Rate and Rhythm: Normal rate and regular rhythm        Pulses: Normal pulses  Heart sounds: Normal heart sounds  Pulmonary:      Effort: Pulmonary effort is normal       Breath sounds: Normal breath sounds  Musculoskeletal:         General: No swelling  Normal range of motion  Cervical back: Normal range of motion and neck supple  Neurological:      Mental Status: She is alert and oriented to person, place, and time  Skin:     General: Skin is warm and dry  Psychiatric:         Mood and Affect: Mood normal          Behavior: Behavior normal          Thought Content: Thought content normal          Judgment: Judgment normal        Infant:  Behaviors: Alert  Color: Pink  Birth weight: 1950gram  Current weight: 3125gram    Problems with infant: None currently  General Appearance:  Alert, active, no distress                             Head:  Normocephalic, AFOF, sutures opposed                             Eyes:  Conjunctiva clear, no drainage                              Ears:  Normally placed, no anomolies                             Nose:  no drainage or erythema                           Mouth:  No lesions  Tongue extends, lateralizes and elevates well  Good cupping of my finger noted with effective sucking                              Neck:  Supple, symmetrical, trachea midline                 Respiratory:  No grunting, flaring, retractions, breath sounds clear and equal            Cardiovascular:  Regular rate and rhythm  No murmur  Adequate perfusion/capillary refill   Femoral pulse present                    Abdomen:   Soft, non-tender, no masses, bowel sounds present, no HSM             Genitourinary:  Normal male, testes descended, no discharge, swelling, or pain, anus patent                          Spine:   No abnormalities noted        Musculoskeletal:  Full range of motion          Skin/Hair/Nails:   Skin warm, dry, and intact, no rashes or abnormal dyspigmentation or lesions                Neurologic:   No abnormal movement, tone appropriate for gestational age        [place] Latch:  Efficiency:               Lips Flanged: Yes              Depth of latch: deep              Audible Swallow: Yes, frequently              Visible Milk: Yes              Wide Open/ Asymmetrical: Yes              Suck Swallow Cycle: Breathing: unlabored, Coordinated: yes  Nipple Assessment after latch: Normal: elongated/eraser, no discoloration and no damage noted  Latch Problems: None  Piyush Lugo latched and fed effectively    Position:  Infant's Ergonomics/Body               Body Alignment: Yes               Head Supported: Yes               Close to Mom's body/ Lifted/ Supported: Yes               Mom's Ergonomics/Body: Yes                           Supported: Yes                           Sitting Back: Yes                           Brings Baby to her breast: Yes  Positioning Problems: None  Alex Garg appeared comfortable and confident  Handouts:   None     Education:    Reviewed Alternative/Artificial Feedings: reviewed paced bottle feeding technique with Will        Plan:  Plan for breastfeeding  Reassurance and support given  I encouraged Alex Garg to continue with the current plan and to call with any concerns              I have spent 60 minutes with Patient and family today in which greater than 50% of this time was spent in counseling/coordination of care regarding Patient and family education

## 2021-08-19 NOTE — PATIENT INSTRUCTIONS
Postpartum Depression   WHAT YOU NEED TO KNOW:   What is postpartum depression? Postpartum depression is a mood disorder that occurs after your baby is born  Your symptoms may last up to 12 months after delivery  Your symptoms may become serious and affect your daily activities and relationships  What are the symptoms of postpartum depression? · Feeling sad, anxious, tearful, discouraged, hopeless, or alone    · Thoughts that you are not a good mother    · Trouble completing daily tasks, concentrating, or remembering things    · Lack of appetite    · Lack interest in your baby    · Feeling restless, irritable, or withdrawn    · An overwhelmed feeling with your new baby and a belief that it will not get better    · Feeling unimportant or guilty most of the time    · Trouble sleeping, even after the baby is asleep    What causes postpartum depression? The exact cause is not known  Hormone levels that increased during pregnancy suddenly drop after your baby is born  This can cause your symptoms  A past episode of postpartum depression or a family history of depression may increase your risk  Postpartum depression may also be trigged by a lack of support at home, stress, or medical problems  How is postpartum depression diagnosed? Healthcare providers will talk to you about how you are feeling and ask if you have any depression  These talks can happen during appointments for your medical care and for your baby's care, such as well child visits   Talk to your providers about the following:  · When you started to feel depressed, and if it is getting worse over time    · Problems you are having with daily activities, sleep, or caring for your baby    · If anything makes your depression worse, or makes you feel better    · Feeling that you are not bonding with your baby the way you want    · Any problems your baby has with sleeping or feeding    · If your baby is fussy or cries a lot    · Support you have from friends, family, or others    How is postpartum depression treated? Treatment may include medicine, talk therapy, or both  · A therapist  can help you find ways to cope with your feelings  This can be done alone or in a group  · Antidepressants  help decrease or stop your symptoms  What can I do to feel better? You may feel better quickly, or if may take a few weeks to feel better  Be patient with yourself  Do the following to take care of yourself:  · Rest as needed  Take a nap or rest while your baby sleeps  Ask someone to watch your baby while you nap  · Get emotional support  Share your feelings with your partner, a friend, or another mother  Ask your partner, friends, or family to help with cooking or cleaning  Do only what is needed and let other things wait until later  · Exercise when you can  Even 5 or 10 minutes of exercise can help improve your mood  Walk around the block or do some stretching exercises  · Eat a variety of healthy foods  Healthy foods include fruits, vegetables, whole-grain breads, low-fat dairy products, beans, lean meats, and fish  Do not skip meals  · Take care of yourself  Shower and dress each day  Write in a journal  Celebrate small achievements, even if it is only 1 thing a day  Try to get out of the house a little each day  Meet a friend for coffee  Call your local emergency number (911 in the [address] [facility]) if:   · You think about hurting yourself or your baby  When should I seek immediate care? · Your feelings of depression or sadness are strong  Call your doctor if:   · Your symptoms last most of the day for days in a row  · Your symptoms last more than 1 week  · You have questions or concerns about your condition or care  CARE AGREEMENT:   You have the right to help plan your care  Learn about your health condition and how it may be treated   Discuss treatment options with your healthcare providers to decide what care you want to receive  You always have the right to refuse treatment  The above information is an  only  It is not intended as medical advice for individual conditions or treatments  Talk to your doctor, nurse or pharmacist before following any medical regimen to see if it is safe and effective for you  © Copyright [employer]  Information is for End User's use only and may not be sold, redistributed or otherwise used for commercial purposes  All illustrations and images included in CareNotes® are the copyrighted property of A D A M , Inc  or [employer]   Postpartum Bleeding   WHAT YOU NEED TO KNOW:   What do I need to know about postpartum bleeding? Postpartum bleeding is vaginal bleeding after childbirth  This bleeding is normal, whether your baby was born vaginally or by   It contains blood and the tissue that lined the inside of your uterus when you were pregnant  What should I expect with postpartum bleeding? Postpartum bleeding usually lasts at least 10 days, and may last longer than 6 weeks  Your bleeding may range from light (barely staining a pad) to heavy (soaking a pad in 1 hour)  Usually, you have heavier bleeding right after childbirth, which slows over the next few weeks until it stops  The bleeding is red or dark brown with clots for the first 1 to 3 days  It then turns pink for several days, and then becomes a white or yellow discharge until it ends  Call your local emergency number (911 in the [address] [facility]) if:   · You are suddenly short of breath and feel lightheaded  · You have sudden chest pain  · You are breathing faster than normal     · Your heart is beating faster than normal     When should I call my doctor or obstetrician? · Your bleeding increases, or you have heavy bleeding that soaks 1 pad in 1 hour for 2 hours in a row  · You have a fever  · You pass large blood clots  · You feel dizzy      · You have a low back ache, abdominal pain or tenderness, or loss of appetite  · You urinate less than usual, or not at all  · You have questions or concerns about your condition or care  CARE AGREEMENT:   You have the right to help plan your care  Learn about your health condition and how it may be treated  Discuss treatment options with your healthcare providers to decide what care you want to receive  You always have the right to refuse treatment  The above information is an  only  It is not intended as medical advice for individual conditions or treatments  Talk to your doctor, nurse or pharmacist before following any medical regimen to see if it is safe and effective for you  © Copyright [employer] 2021 Information is for End User's use only and may not be sold, redistributed or otherwise used for commercial purposes   All illustrations and images included in CareNotes® are the copyrighted property of A D A M , Inc  or [employer]

## 2021-08-19 NOTE — PROGRESS NOTES
Vira Garcia is a 35 y o  y o  female [id number] who presents for a postpartum visit  She is 7 weeks postpartum following a spontaneous vaginal delivery on 6/28/21  Pregnancy complicated by  Abnormal glucose, GBS  Bacteriuria, obesity and HSV  The delivery was at 33 1 gestational weeks  Labor and delivery was complicated by  Preeclampsia was severe features necessitating labetalol, magnesium  Patient had betamethasone prior to delivery  Liver enzymes increased throughout stay  Was discharged on Procardia XL 30 mg  Stopped a few weeks ago  Denies HA, swelling, SOB  Male infant  with Apgars of 8/9  Postpartum course has been uncomplicated  Baby's course has been complicated by 12 day NICU stay  Baby is home now doing well  Baby is feeding by breast  Bleeding no bleeding  Bowel function is normal  Bladder function is normal  Patient is not sexually active  Contraception method is condoms  Postpartum depression screening: negative  The following portions of the patient's history were reviewed and updated as appropriate: allergies, current medications, past medical history, past social history, past surgical history and problem list     Review of Systems  Pertinent items are noted in HPI       Objective     /60   Wt 137lb      General:   appears stated age, cooperative, alert normal mood and affect   Neck: Neck: normal, supple,trachea midline, no masses   Heart: regular rate and rhythm, S1, S2 normal, no murmur, click, rub or gallop   Lungs: clear to auscultation bilaterally     Assessment/Plan     7 week postpartum exam    Pap smear 10/8/20 resulted ASCUS    1  Contraception: condoms  2  Patient verbalizes understanding of support and [facility]  Written information provided  3  Reviewed abnormal Pap smear and need for repeat Pap smear in October 4  Signs and symptoms to report reviewed    Follow up in: 2 months  For annual exam with repeat Pap smear or as needed

## 2021-08-20 ENCOUNTER — POSTPARTUM VISIT (OUTPATIENT)
Dept: OBGYN CLINIC | Facility: MEDICAL CENTER | Age: 31
End: 2021-08-20
Payer: COMMERCIAL

## 2021-08-20 VITALS
HEIGHT: 60 IN | SYSTOLIC BLOOD PRESSURE: 110 MMHG | BODY MASS INDEX: 26.9 KG/M2 | DIASTOLIC BLOOD PRESSURE: 60 MMHG | WEIGHT: 137 LBS

## 2021-08-20 PROBLEM — R10.13 EPIGASTRIC ABDOMINAL PAIN AFFECTING PREGNANCY IN THIRD TRIMESTER: Status: RESOLVED | Noted: 2021-06-24 | Resolved: 2021-08-20

## 2021-08-20 PROBLEM — O14.13 SEVERE PRE-ECLAMPSIA IN THIRD TRIMESTER: Status: RESOLVED | Noted: 2021-06-24 | Resolved: 2021-08-20

## 2021-08-20 PROBLEM — O99.210 OBESITY IN PREGNANCY: Status: RESOLVED | Noted: 2021-06-23 | Resolved: 2021-08-20

## 2021-08-20 PROBLEM — O99.810 ABNORMAL GLUCOSE AFFECTING PREGNANCY: Status: RESOLVED | Noted: 2021-06-20 | Resolved: 2021-08-20

## 2021-08-20 PROBLEM — Z34.02 ENCOUNTER FOR SUPERVISION OF NORMAL FIRST PREGNANCY IN SECOND TRIMESTER: Status: RESOLVED | Noted: 2021-03-03 | Resolved: 2021-08-20

## 2021-08-20 PROBLEM — Z3A.33 33 WEEKS GESTATION OF PREGNANCY: Status: RESOLVED | Noted: 2021-03-03 | Resolved: 2021-08-20

## 2021-08-20 PROBLEM — R82.71 GBS BACTERIURIA: Status: RESOLVED | Noted: 2021-03-03 | Resolved: 2021-08-20

## 2021-08-20 PROBLEM — O26.893 EPIGASTRIC ABDOMINAL PAIN AFFECTING PREGNANCY IN THIRD TRIMESTER: Status: RESOLVED | Noted: 2021-06-24 | Resolved: 2021-08-20

## 2021-08-20 PROCEDURE — 99214 OFFICE O/P EST MOD 30 MIN: CPT | Performed by: NURSE PRACTITIONER

## 2021-09-13 ENCOUNTER — TELEPHONE (OUTPATIENT)
Dept: OBGYN CLINIC | Facility: MEDICAL CENTER | Age: 31
End: 2021-09-13

## 2021-09-13 NOTE — TELEPHONE ENCOUNTER
Patient called and left voice message today requesting a return to work note  She is asking to return starting 9/15/2021  Would it be okay if we were to type this for her? Thank you

## 2022-03-03 ENCOUNTER — APPOINTMENT (OUTPATIENT)
Dept: LAB | Facility: CLINIC | Age: 32
End: 2022-03-03
Payer: COMMERCIAL

## 2022-03-03 ENCOUNTER — OFFICE VISIT (OUTPATIENT)
Dept: OBGYN CLINIC | Facility: CLINIC | Age: 32
End: 2022-03-03

## 2022-03-03 VITALS
BODY MASS INDEX: 25.58 KG/M2 | SYSTOLIC BLOOD PRESSURE: 131 MMHG | DIASTOLIC BLOOD PRESSURE: 84 MMHG | WEIGHT: 131 LBS | HEART RATE: 83 BPM

## 2022-03-03 DIAGNOSIS — Z87.59 HISTORY OF SEVERE PRE-ECLAMPSIA: ICD-10-CM

## 2022-03-03 DIAGNOSIS — O14.13 PRE-ECLAMPSIA, SEVERE, THIRD TRIMESTER: ICD-10-CM

## 2022-03-03 DIAGNOSIS — Z32.00 POSSIBLE PREGNANCY: Primary | ICD-10-CM

## 2022-03-03 DIAGNOSIS — B00.9 HSV (HERPES SIMPLEX VIRUS) INFECTION: ICD-10-CM

## 2022-03-03 DIAGNOSIS — N91.2 AMENORRHEA: ICD-10-CM

## 2022-03-03 LAB — SL AMB POCT URINE HCG: POSITIVE

## 2022-03-03 PROCEDURE — 76801 OB US < 14 WKS SINGLE FETUS: CPT | Performed by: OBSTETRICS & GYNECOLOGY

## 2022-03-03 PROCEDURE — 36415 COLL VENOUS BLD VENIPUNCTURE: CPT

## 2022-03-03 PROCEDURE — 85732 THROMBOPLASTIN TIME PARTIAL: CPT

## 2022-03-03 PROCEDURE — 86146 BETA-2 GLYCOPROTEIN ANTIBODY: CPT

## 2022-03-03 PROCEDURE — 85705 THROMBOPLASTIN INHIBITION: CPT

## 2022-03-03 PROCEDURE — 99213 OFFICE O/P EST LOW 20 MIN: CPT | Performed by: OBSTETRICS & GYNECOLOGY

## 2022-03-03 PROCEDURE — 85670 THROMBIN TIME PLASMA: CPT

## 2022-03-03 PROCEDURE — 86147 CARDIOLIPIN ANTIBODY EA IG: CPT

## 2022-03-03 PROCEDURE — 81025 URINE PREGNANCY TEST: CPT | Performed by: OBSTETRICS & GYNECOLOGY

## 2022-03-03 PROCEDURE — 85613 RUSSELL VIPER VENOM DILUTED: CPT

## 2022-03-03 NOTE — ASSESSMENT & PLAN NOTE
- Partner with HSV   - Positive IgG on 1/2021 at [facility]  - Will require valtrex suppression

## 2022-03-03 NOTE — ASSESSMENT & PLAN NOTE
- LMP 12/13/2021  - EGA based on LMP 9w4d  - Cardiac activity at 178 bpm  - SERA by Ultrasound 10/02/2022  - Continue prenatal vitamin   - Prenatal nursing intake in 2 weeks   - Prenatal H&P in 4 weeks

## 2022-03-03 NOTE — PATIENT INSTRUCTIONS
Pregnancy   WHAT YOU NEED TO KNOW:   A normal pregnancy lasts about 40 weeks  The first trimester lasts from your last period through the 12th week of pregnancy  The second trimester lasts from the 13th week through the 23rd week  The third trimester lasts from the 24th week until your baby is born  If you know the date of your last period, your healthcare provider can estimate your due date  You may give birth to your baby any time from 37 weeks to 2 weeks after your due date  DISCHARGE INSTRUCTIONS:   Return to the emergency department if:   · You develop a severe headache that does not go away  · You have new or increased vision changes, such as blurred or spotted vision  · You have new or increased swelling in your face or hands  · You have pain or cramping in your abdomen or low back  · You have vaginal bleeding  Call your doctor or obstetrician if:   · You have abdominal cramps, pressure, or tightening  · You have a change in vaginal discharge  · You cannot keep food or drinks down, and you are losing weight  · You have chills or a fever  · You have vaginal itching, burning, or pain  · You have yellow, green, white, or foul-smelling vaginal discharge  · You have pain or burning when you urinate, less urine than usual, or pink or bloody urine  · You have questions or concerns about your condition or care  Medicines:   · Prenatal vitamins  provide some of the extra vitamins and minerals you need during pregnancy  Prenatal vitamins may also help to decrease the risk for certain birth defects  · Take your medicine as directed  Contact your healthcare provider if you think your medicine is not helping or if you have side effects  Tell him or her if you are allergic to any medicine  Keep a list of the medicines, vitamins, and herbs you take  Include the amounts, and when and why you take them  Bring the list or the pill bottles to follow-up visits   Carry your medicine list with you in case of an emergency  Prenatal care:  Prenatal care is a series of visits with your healthcare provider throughout your pregnancy  Prenatal care can help prevent problems during pregnancy and childbirth  At each prenatal visit, your provider will weigh you and check your blood pressure  He or she will also check your baby's heartbeat and growth  You may also need the following at some visits:  · A pelvic exam  allows your healthcare provider to see your cervix (the bottom part of your uterus)  Your healthcare provider will use a speculum to open your vagina  He or she will check the size and shape of your uterus  At your first prenatal visit, you may also have a Pap smear  This is a test to check your cervix for abnormal cells  · Blood tests  may be done to check for any of the following:     ? Gestational diabetes or anemia (low iron level)    ? Blood type or Rh factor, or certain birth defects    ? Immunity to certain diseases, such as chickenpox or rubella    ? An infection, such as a sexually transmitted infection, HIV, or hepatitis B    · Hepatitis B  may need to be prevented or treated  Hepatitis B is inflammation of the liver caused by the hepatitis B virus (HBV)  HBV can spread from a mother to her baby during delivery  You will be checked for HBV as early as possible in the first trimester of each pregnancy  You need the test even if you received the hepatitis B vaccine or were tested before  You may need to have an HBV infection treated before you give birth  · Urine tests  may also be done to check for sugar and protein  These can be signs of gestational diabetes or preeclampsia  Urine tests may also be done to check for signs of infection  · A gestational diabetes screen  may be done  Your healthcare provider may order either a 1-step or 2-step oral glucose tolerance test (OGTT)  ?  1-step OGTT:  Your blood sugar level will be tested after you have not eaten for 8 hours (fasting)  You will then be given a glucose drink  Your level will be tested again 1 hour and 2 hours after you finish the drink  ? 2-step OGTT:  You do not have to fast for the first part of the test  You will have the glucose drink at any time of day  Your blood sugar level will be checked 1 hour later  If your blood sugar is higher than a certain level, another test will be ordered  You will fast and your blood sugar level will be tested  You will have the glucose drink  Your blood will be tested again 1 hour, 2 hours, and 3 hours after you finish the glucose drink  · A fetal ultrasound  shows pictures of your baby inside your uterus  The pictures are used to check your baby's development, movement, and position  · Genetic disorder screening tests  may be offered to you  These tests check your baby's risk for genetic disorders such as Down syndrome  A screening test may include blood tests and an ultrasound  Blood tests may be used to check your DNA or your partner's DNA  Genetic tests are not always accurate or complete  Your baby may be born with a genetic disorder that did not show up in the tests  Talk to your healthcare provider about any concerns you have with genetic testing  Body changes that may occur during your pregnancy:   · Breast changes  you will experience include tenderness and tingling during the early part of your pregnancy  Your breasts will become larger  You may need to use a support bra  You may see a thin, yellow fluid, called colostrum, leak from your nipples during the second trimester  Colostrum is a liquid that changes to milk about 3 days after you give birth  · Skin changes and stretch marks  may occur during your pregnancy  You may have red marks, called stretch marks, on your skin  Stretch marks will usually fade after pregnancy  Use lotion if your skin is dry and itchy  The skin on your face, around your nipples, and below your belly button may darken   Most of the time, your skin will return to its normal color after your baby is born  · Morning sickness  is nausea and vomiting that can happen at any time of day  Avoid fatty and spicy foods  Eat small meals throughout the day instead of large meals  Es may help to decrease nausea  Ask your healthcare provider about other ways of decreasing nausea and vomiting  · Heartburn  may be caused by changes in your hormones during pregnancy  Your growing uterus may also push your stomach upward and force stomach acid to back up into your esophagus  Eat 4 or 5 small meals each day instead of large meals  Avoid spicy foods  Avoid eating right before bedtime  · Constipation  may develop during your pregnancy  To treat constipation, eat foods high in fiber such as fiber cereals, beans, fruits, vegetables, whole-grain breads, and prune juice  Get regular exercise and drink plenty of water  Your healthcare provider may also suggest a fiber supplement to soften your bowel movements  Talk to your healthcare provider before you use any medicines to decrease constipation  · Hemorrhoids  are enlarged veins in the rectal area  They may cause pain, itching, and bright red bleeding from your rectum  To decrease your risk for hemorrhoids, prevent constipation and do not strain to have a bowel movement  If you have hemorrhoids, soak in a tub of warm water to ease discomfort  Ask your healthcare provider how you can treat hemorrhoids  · Leg cramps and swelling  may be caused by low calcium levels or the added weight of pregnancy  Raise your legs above the level of your heart to decrease swelling  During a leg cramp, stretch or massage the muscle that has the cramp  Heat may help decrease pain and muscle spasms  Apply heat on your muscle for 20 to 30 minutes every 2 hours for as many days as directed  · Back pain  may occur as your baby grows  Do not stand for long periods of time or lift heavy items   Use good posture while you stand, squat, or bend  Wear low-heeled shoes with good support  Rest may also help to relieve back pain  Ask your healthcare provider about exercises you can do to strengthen your back muscles  Stay healthy during your pregnancy:       · Eat a variety of healthy foods  Healthy foods include fruits, vegetables, whole-grain breads, low-fat dairy foods, beans, lean meats, and fish  Drink liquids as directed  Ask how much liquid to drink each day and which liquids are best for you  Limit caffeine to less than 200 milligrams each day  Limit your intake of fish to 2 servings each week  Choose fish low in mercury such as canned light tuna, shrimp, crab, salmon, cod, or tilapia  Do not  eat fish high in mercury such as swordfish, tilefish, saadia mackerel, and shark  · Take prenatal vitamins as directed  Your need for certain vitamins and minerals, such as folic acid, increases during pregnancy  Prenatal vitamins provide some of the extra vitamins and minerals you need  Prenatal vitamins may also help to decrease the risk for certain birth defects  · Ask how much weight you should gain during your pregnancy  Too much or too little weight gain can be unhealthy for you and your baby  · Talk to your healthcare provider about exercise  Moderate exercise can help you stay fit  Your healthcare provider will help you plan an exercise program that is safe for you during pregnancy  · Do not smoke  Smoking increases your risk for a miscarriage and heart and blood vessel problems  Smoking can cause your baby to be born too early or weigh less at birth  Quit smoking as soon as you think you might be pregnant  Ask your healthcare provider for information if you need help quitting  · Do not drink alcohol  Alcohol passes from your body to your baby through the placenta  It can affect your baby's brain development and cause fetal alcohol syndrome (FAS)   FAS is a group of conditions that causes mental, behavior, and growth problems  · Talk to your healthcare provider before you take any medicines  Many medicines may harm your baby if you take them when you are pregnant  Do not take any medicines, vitamins, herbs, or supplements without first talking to your healthcare provider  Never use illegal or street drugs (such as marijuana or cocaine) while you are pregnant  Safety tips:   · Avoid hot tubs and saunas  Do not use a hot tub or sauna while you are pregnant, especially during your first trimester  Hot tubs and saunas may raise your baby's temperature and increase the risk for birth defects  · Avoid toxoplasmosis  This is an infection caused by eating raw meat or being around infected cat feces  It can cause birth defects, miscarriages, and other problems  Wash your hands after you touch raw meat  Make sure any meat is well-cooked before you eat it  Avoid raw eggs and unpasteurized milk  Use gloves or ask someone else to clean your cat's litter box while you are pregnant  · Ask your healthcare provider about travel  The most comfortable time to travel is during the second trimester  Ask your healthcare provider if you can travel after 36 weeks  You may not be able to travel in an airplane after 36 weeks  He or she may also recommend that you avoid long road trips  Follow up with your doctor or obstetrician as directed:  Go to all of your prenatal visits during your pregnancy  Write down your questions so you remember to ask them during your visits  © Copyright [employer] 2022 Information is for End User's use only and may not be sold, redistributed or otherwise used for commercial purposes  All illustrations and images included in CareNotes® are the copyrighted property of A D A M [employer] Inc  or Tam Hernandez  The above information is an  only  It is not intended as medical advice for individual conditions or treatments   Talk to your doctor, nurse or pharmacist before following any medical regimen to see if it is safe and effective for you

## 2022-03-03 NOTE — ASSESSMENT & PLAN NOTE
- Prior pregnancy complicated by preeclampsia with severe features requiring delivery at 33 weeks   - Will complete thrombophilia workup  - Will start low dose aspirin

## 2022-03-03 NOTE — PROGRESS NOTES
Colleen 1822; 1990  [id number]      Assessment  28 y o  [id number] at Unknown presenting for amenorrhea  Pregnancy confirmed, dating inconsistent with LMP  SERA 10/02/2022  Plan  Problem List Items Addressed This Visit        Other    HSV (herpes simplex virus) infection     - Partner with HSV   - Positive IgG on 1/2021 at [address] require valtrex suppression          Amenorrhea     - LMP 12/13/2021  - EGA based on LMP 9w4d  - Cardiac activity at 178 bpm  - SERA by Ultrasound 10/02/2022  - Continue prenatal vitamin   - Prenatal nursing intake in 2 weeks   - Prenatal H&P in 4 weeks          Relevant Orders    AMB US OB < 14 weeks single or first gestation level 1 (Completed)    History of severe pre-eclampsia     - Prior pregnancy complicated by preeclampsia with severe features requiring delivery at 33 weeks   - Will complete thrombophilia workup  - Will start low dose aspirin            Other Visit Diagnoses     Possible pregnancy    -  Primary    Relevant Orders    POCT urine HCG (Completed)    AMB US OB < 14 weeks single or first gestation level 1 (Completed)    Pre-eclampsia, severe, third trimester        Relevant Orders    Beta-2 glycoprotein antibodies    Cardiolipin antibody    Lupus anticoagulant          ____________________________________________________________      Subjective   Tio Jamison is a 28 y o  [id number] with an LMP of Patient's last menstrual period was 12/13/2021 (exact date)  who presents for amenorrhea  She notes she took a home pregnancy test on 2/13/22 and it was positive  She is currently otherwise without complaint  Patient notes that this pregnancy was unplanned  She was not using contraception at the time  She reports she is certain of her LMP and that she has regular menses  She has has no vaginal bleeding since her LMP  Patient's prior pregnancies were complicated by preeclampsia with severe features      Discussed with patient the need for thrombophilia workup due to history of preeclampsia with severe features requiring delivery prior to 34 weeks  Patient expressed understanding  Will get labs drawn and begin low dose aspirin  Patient reports partner has HSV and is unsure if she is infected or not, will review labs and call patient to discuss        Objective  /84   Pulse 83   Wt 59 4 kg (131 lb)   LMP 12/13/2021 (Exact Date)   BMI 25 58 kg/m²       Transvaginal Pelvic Ultrasound  [place] [facility]  CRL 2 73cm, consistent with EGA 9w4d  +yolk sac  +cardiac activity   bmp  No adnexal masses appreciated            Esther Antonio MD PGY1  03/03/22

## 2022-03-04 LAB
APTT SCREEN TO CONFIRM RATIO: 1.16 RATIO (ref 0–1.34)
CONFIRM APTT/NORMAL: 41.9 SEC (ref 0–47.6)
LA PPP-IMP: NORMAL
SCREEN APTT: 31.1 SEC (ref 0–51.9)
SCREEN DRVVT: 38.7 SEC (ref 0–47)
THROMBIN TIME: 15 SEC (ref 0–23)

## 2022-03-07 ENCOUNTER — TELEPHONE (OUTPATIENT)
Dept: OTHER | Facility: HOSPITAL | Age: 32
End: 2022-03-07

## 2022-03-07 NOTE — TELEPHONE ENCOUNTER
Patient inquired in clinic about previous HSV results since patient's partner has genital herpes  Discussed with patient positive HSV IgG from previous prenatal panel and need for Valtrex suppression at 36 weeks  Patient expressed understanding and denies any other questions at this time    Patient to follow-up in clinic on 03/24 for prenatal H&P

## 2022-03-08 LAB
B2 GLYCOPROT1 IGA SERPL IA-ACNC: 1
B2 GLYCOPROT1 IGG SERPL IA-ACNC: 0.6
B2 GLYCOPROT1 IGM SERPL IA-ACNC: <2.9
CARDIOLIPIN IGA SER IA-ACNC: 1.1
CARDIOLIPIN IGG SER IA-ACNC: 0.8
CARDIOLIPIN IGM SER IA-ACNC: 1.2

## 2022-03-21 ENCOUNTER — PATIENT OUTREACH (OUTPATIENT)
Dept: OBGYN CLINIC | Facility: CLINIC | Age: 32
End: 2022-03-21

## 2022-03-21 ENCOUNTER — INITIAL PRENATAL (OUTPATIENT)
Dept: OBGYN CLINIC | Facility: CLINIC | Age: 32
End: 2022-03-21

## 2022-03-21 ENCOUNTER — APPOINTMENT (OUTPATIENT)
Dept: LAB | Facility: CLINIC | Age: 32
End: 2022-03-21
Payer: COMMERCIAL

## 2022-03-21 VITALS
SYSTOLIC BLOOD PRESSURE: 133 MMHG | HEART RATE: 99 BPM | BODY MASS INDEX: 26.9 KG/M2 | HEIGHT: 60 IN | WEIGHT: 137 LBS | DIASTOLIC BLOOD PRESSURE: 86 MMHG

## 2022-03-21 DIAGNOSIS — Z00.00 HEALTH CARE MAINTENANCE: ICD-10-CM

## 2022-03-21 DIAGNOSIS — Z34.91 PRENATAL CARE IN FIRST TRIMESTER: ICD-10-CM

## 2022-03-21 DIAGNOSIS — Z34.91 PRENATAL CARE IN FIRST TRIMESTER: Primary | ICD-10-CM

## 2022-03-21 DIAGNOSIS — Z87.59 HISTORY OF PRE-ECLAMPSIA: ICD-10-CM

## 2022-03-21 LAB
ABO GROUP BLD: NORMAL
ALBUMIN SERPL BCP-MCNC: 3.4 G/DL (ref 3.5–5)
ALP SERPL-CCNC: 91 U/L (ref 46–116)
ALT SERPL W P-5'-P-CCNC: 63 U/L (ref 12–78)
ANION GAP SERPL CALCULATED.3IONS-SCNC: 7 MMOL/L (ref 4–13)
AST SERPL W P-5'-P-CCNC: 25 U/L (ref 5–45)
BACTERIA UR QL AUTO: ABNORMAL /HPF
BASOPHILS # BLD AUTO: 0.05 THOUSANDS/ΜL (ref 0–0.1)
BASOPHILS NFR BLD AUTO: 1 % (ref 0–1)
BILIRUB SERPL-MCNC: 0.43 MG/DL (ref 0.2–1)
BILIRUB UR QL STRIP: NEGATIVE
BLD GP AB SCN SERPL QL: NEGATIVE
BUN SERPL-MCNC: 11 MG/DL (ref 5–25)
CALCIUM ALBUM COR SERPL-MCNC: 9.7 MG/DL (ref 8.3–10.1)
CALCIUM SERPL-MCNC: 9.2 MG/DL (ref 8.3–10.1)
CHLORIDE SERPL-SCNC: 106 MMOL/L (ref 100–108)
CLARITY UR: ABNORMAL
CO2 SERPL-SCNC: 22 MMOL/L (ref 21–32)
COLOR UR: ABNORMAL
CREAT SERPL-MCNC: 0.39 MG/DL (ref 0.6–1.3)
CREAT UR-MCNC: 117 MG/DL
EOSINOPHIL # BLD AUTO: 0.28 THOUSAND/ΜL (ref 0–0.61)
EOSINOPHIL NFR BLD AUTO: 3 % (ref 0–6)
ERYTHROCYTE [DISTWIDTH] IN BLOOD BY AUTOMATED COUNT: 14 % (ref 11.6–15.1)
GFR SERPL CREATININE-BSD FRML MDRD: 141 ML/MIN/1.73SQ M
GLUCOSE P FAST SERPL-MCNC: 80 MG/DL (ref 65–99)
GLUCOSE UR STRIP-MCNC: NEGATIVE MG/DL
HBV SURFACE AG SER QL: NORMAL
HCT VFR BLD AUTO: 36.1 % (ref 34.8–46.1)
HCV AB SER QL: NORMAL
HGB BLD-MCNC: 12 G/DL (ref 11.5–15.4)
HGB UR QL STRIP.AUTO: NEGATIVE
IMM GRANULOCYTES # BLD AUTO: 0.03 THOUSAND/UL (ref 0–0.2)
IMM GRANULOCYTES NFR BLD AUTO: 0 % (ref 0–2)
KETONES UR STRIP-MCNC: NEGATIVE MG/DL
LEUKOCYTE ESTERASE UR QL STRIP: ABNORMAL
LYMPHOCYTES # BLD AUTO: 2.72 THOUSANDS/ΜL (ref 0.6–4.47)
LYMPHOCYTES NFR BLD AUTO: 27 % (ref 14–44)
MCH RBC QN AUTO: 26.6 PG (ref 26.8–34.3)
MCHC RBC AUTO-ENTMCNC: 33.2 G/DL (ref 31.4–37.4)
MCV RBC AUTO: 80 FL (ref 82–98)
MONOCYTES # BLD AUTO: 0.59 THOUSAND/ΜL (ref 0.17–1.22)
MONOCYTES NFR BLD AUTO: 6 % (ref 4–12)
MUCOUS THREADS UR QL AUTO: ABNORMAL
NEUTROPHILS # BLD AUTO: 6.61 THOUSANDS/ΜL (ref 1.85–7.62)
NEUTS SEG NFR BLD AUTO: 63 % (ref 43–75)
NITRITE UR QL STRIP: NEGATIVE
NON-SQ EPI CELLS URNS QL MICRO: ABNORMAL /HPF
NRBC BLD AUTO-RTO: 0 /100 WBCS
PH UR STRIP.AUTO: 6.5 [PH]
PLATELET # BLD AUTO: 383 THOUSANDS/UL (ref 149–390)
PMV BLD AUTO: 10.9 FL (ref 8.9–12.7)
POTASSIUM SERPL-SCNC: 3.8 MMOL/L (ref 3.5–5.3)
PROT SERPL-MCNC: 7.7 G/DL (ref 6.4–8.2)
PROT UR STRIP-MCNC: ABNORMAL MG/DL
PROT UR-MCNC: 16 MG/DL
PROT/CREAT UR: 0.14 MG/G{CREAT} (ref 0–0.1)
RBC # BLD AUTO: 4.51 MILLION/UL (ref 3.81–5.12)
RBC #/AREA URNS AUTO: ABNORMAL /HPF
RH BLD: POSITIVE
RUBV IGG SERPL IA-ACNC: 28.4 IU/ML
SODIUM SERPL-SCNC: 135 MMOL/L (ref 136–145)
SP GR UR STRIP.AUTO: 1.03 (ref 1–1.03)
SPECIMEN EXPIRATION DATE: NORMAL
UROBILINOGEN UR STRIP-ACNC: <2 MG/DL
WBC # BLD AUTO: 10.28 THOUSAND/UL (ref 4.31–10.16)
WBC #/AREA URNS AUTO: ABNORMAL /HPF

## 2022-03-21 PROCEDURE — 81001 URINALYSIS AUTO W/SCOPE: CPT

## 2022-03-21 PROCEDURE — 80081 OBSTETRIC PANEL INC HIV TSTG: CPT

## 2022-03-21 PROCEDURE — 87147 CULTURE TYPE IMMUNOLOGIC: CPT

## 2022-03-21 PROCEDURE — 36415 COLL VENOUS BLD VENIPUNCTURE: CPT

## 2022-03-21 PROCEDURE — 82570 ASSAY OF URINE CREATININE: CPT

## 2022-03-21 PROCEDURE — 87086 URINE CULTURE/COLONY COUNT: CPT

## 2022-03-21 PROCEDURE — T1001 NURSING ASSESSMENT/EVALUATN: HCPCS

## 2022-03-21 PROCEDURE — 83020 HEMOGLOBIN ELECTROPHORESIS: CPT

## 2022-03-21 PROCEDURE — 86803 HEPATITIS C AB TEST: CPT

## 2022-03-21 PROCEDURE — 84156 ASSAY OF PROTEIN URINE: CPT

## 2022-03-21 PROCEDURE — 80053 COMPREHEN METABOLIC PANEL: CPT

## 2022-03-21 RX ORDER — ASPIRIN 81 MG/1
81 TABLET ORAL DAILY
COMMUNITY

## 2022-03-21 NOTE — PROGRESS NOTES
OBSTETRIC INTAKE VISIT    Mike Vincent presents today for initial OB  intake  History obtained from patient and she reports it as follows:    Past Medical History:   Diagnosis Date    Abnormal Pap smear of cervix     Environmental allergies     Genital herpes     Seasonal allergies     Severe pre-eclampsia in third trimester 2021    Varicella      Past Surgical History:   Procedure Laterality Date    NO PAST SURGERIES       OB History    Para Term  AB Living   3 1   1 1 1   SAB IAB Ectopic Multiple Live Births   1     0 1      # Outcome Date GA Lbr Grupo/2nd Weight Sex Delivery Anes PTL Lv   3 Current            2  21 33w1d / 00:09  M Vag-Spont EPI  SUMIT   1 SAB 2013     SAB         Birth Comments: early first trimester SAB     Social History     Tobacco Use    Smoking status: Never Smoker    Smokeless tobacco: Never Used   Vaping Use    Vaping Use: Never used   Substance Use Topics    Alcohol use: Not Currently     Comment: social/prior to pregnancy    Drug use: Never       Current Outpatient Medications   Medication Instructions    acetaminophen (TYLENOL) 650 mg, Oral, Every 6 hours PRN    aspirin (ECOTRIN LOW STRENGTH) 81 mg, Oral, Daily    Prenatal MV-Min-Fe Fum-FA-DHA (PRENATAL 1 PO) Oral       Allergies   Allergen Reactions    Penicillins Rash       Dating of Pregnancy: ultrasound done 3/3/2022, SERA 10/2/2022    Review of Systems:  Denies vaginal bleeding or leaking of fluid  Denies uterine contractions or abdominal pain  Exam:  /86   Pulse 99   Ht 5' (1 524 m)   Wt 62 1 kg (137 lb)   LMP 2021 (Exact Date)   BMI 26 76 kg/m²        Plan  1  Prenatal care in first trimester  - Protein / creatinine ratio, urine  - Comprehensive metabolic panel  - Prenatal Panel  - Hepatitis C antibody  - Hgb Fractionation [place]  - Ambulatory Referral to Social Work Care Management Program  - Ambulatory Referral to Maternal Fetal Medicine    2   History of pre-eclampsia    - Protein / creatinine ratio, urine  - Comprehensive metabolic panel;    3  Health care maintenance  - Ambulatory Referral to Dentistry  2  Next ultrasound scheduled for with MFM  3  Return 3/24/2022  6  Reviewed the following educational topics with patient:   -routine prenatal visit/ultrasound/labwork schedule   -nutritional demands of pregnancy, healthy dietary habits   -listeria, toxoplasmosis, seafood precautions   -weight gain expectations (based on pre-pregnant BMI)   -exercise, rest, and sexual activity during pregnancy   -abstinence from alcohol, tobacco, and illegal drugs   -common discomforts of pregnancy and appropriate management   -OTC medications safe to use in pregnancy   -genetic screening options   -WIC referral   -symptoms to report to OB provider    -signs of PTL    -vaginal bleeding/leaking of fluid    -severe n/v-unable to tolerate ANY food/fluids for more than 24 hours    Patient oriented to our office and all questions were answered       April Wilson RN  3/21/2022

## 2022-03-21 NOTE — PROGRESS NOTES
ZEYAD NAVA was referred by Dr Karla Rose for PN SW assessment  ZEYAD NAVA is familiar with this mother from her prior delivery - hx of preeclampsia and  delivery at 33w GA  She is now , ZEYAD NAVA met with her in the office  She reports this pregnancy is unplanned but welcomed  She is no longer working as a  for [employer] - she is working part time as a  at [employer]  Her S/O Will continues to work in clinical trials for a [email or web address]  She reports she is doing well post partum - no PPD/PPA  Red Kussmaul is now 9mo, still breast feeding, she would like to BF him until 12 months  She denies any housing concerns  She has MA and WIC  Drives self to appointments  She has no concerns obtaining baby supplies  Denies any hx of D&A, CYS, legal, DV concerns  No MH concerns  ZEYAD NAVA will place on SW survelliance d/t history of  delivery  Will f/u in three months

## 2022-03-21 NOTE — PATIENT INSTRUCTIONS
The First Trimester  (up through 14 weeks)   YOUR BABY   · Your baby starts to develop when your egg and a sperm come together  · Your baby grows inside your uterus (also called your womb)  It floats inside a bag of water - called the amniotic sac  The baby is connected to you by an umbilical cord which goes from the babys belly to the placenta  The placenta is attached to your uterus, and the placenta is where blood, oxygen, and food cross over from you to your baby  · Unhealthy things like drugs, alcohol, and tobacco can also cross over from you to your baby through the placenta  It is important to avoid these things as they can be harmful to how your baby develops and grows  · By the end of the first month, your babys heart is beating  The baby is about the size of a piece of rice  · By the end of the second month, all of you babys organ (heart, lungs, brain, skull) have completely formed  Now they just have to continue maturing and growing  The baby is about the size of a grape  · By the end of the third month, your baby is about 4 inches long! YOUR BODY   · Your period stops - this might be the first sign that you have that you are pregnant   · Your breasts may become sore and tender  · You may feel very tired  · You may have an upset stomach with nausea and/or vomiting which can occur at any time of day - or sometimes all day long  · You may feel tanner and cranky  You may also feel afraid or happy  This is all normal and natural!   · You may lose or gain weight  This is okay as well, as long as you are not losing or gaining a lot of weight          The Second Trimester (14-28 weeks)   YOUR BABY   * 4th month   · your baby has eyelashes and eyebrows   · your baby kicks, moves, and swallows   · your baby is 6 to 7 inches long   * 5th month   · your baby has fingernails   · your baby starts to having sleeping and awake cycles   · baby becomes very active (even though you may not always feel it)   · your baby is 8 to 12 inches long and weighs anywhere from ½ to 1 pound   * 6th month   · your babys eyes are almost completely formed and they will soon open and close   · babys skin is red and wrinkly and covered with a fine, soft hair called lanugo   · baby continues to be very active and you should be feeling it very well and very often   · your baby is 11 to 14 inches long     YOUR BODY   · Morning sickness usually starts to go away and women generally start to gaining weight more quickly  · You may start to get stretch marks on your belly and/or breasts which can be itchy  Softly rub lotion onto them to help relieve the itching  · Your vaginal discharge may become whitish in color  · You may become constipated  Try increasing fiber in your diet, or you may take a stool softener pill (such as Colace) to relieve the discomfort  · You may start to have heartburn  Medications like Tums or Maalox may help to relieve this  Try staying in a sitting position for at least an hour after meals to decrease heartburn  · You should try to get 8 hours of sleep at night, plus a nap during the day if possible  · You should not sit for longer than 2 hours without taking a break to stretch your legs  The Third Trimester  (28-42 weeks)  YOUR BABY   · your baby sucks its thumb now! · your baby can hear voices and respond to touch   so talk to him or her!!   · your babys brain grows and develops most in the last 2 months of pregnancy   · babys head and bones are soft and flexible so they can fit through the birth canal   · babys movements change towards the end of pregnancy because there is less room for kicking and stretching in your belly   · babys lungs are not fully developed and completely ready to breathe on their own until the last 3-4 weeks before your due date    YOUR BODY   · your belly is growing a lot now   · it may become more difficult to sleep well at night or to be as active as you usually are   · you may sweat more than usual   · you will become more off-balancebe careful not to fall!!   · you may develop hemorrhoids (which can be painful and make it difficult to sit down)   · the last two months of pregnancy can become very uncomfortablewith backaches, headaches, and heartburn   · you can start to have contractions  as long as they are irregular and less than 5 per hour, this is a normal part of your body getting ready to have a baby   · your cervix may start to thin out and open upto get ready for delivery   · you may find yourself needing to pee very often  because baby is pressing on your bladder so much   · you may get out of breathe more quickly than usual          Is my baby developing normally? GENETIC SCREENING    One of the concerns that many women have about their pregnancy is whether their baby is developing normally  There are various different tests that we can use try to help determine whether babies are developing normally or not  Some women have a higher risk for their baby to develop abnormally (sometimes called a birth defect), but it can happen to ANYONE  That is why we offer these tests to ALL women during their pregnancies  None of these tests are required  It is your choice which ones you choose to have done or choose to NOT have done during your pregnancy  Some of these tests are only available at a particular time during your pregnancy, so it is important to make decisions about what testing you desire early in the pregnancy  Here is some information about these different tests to help you make decisions about whether any of these tests are right for you       * ANATOMY ULTRASOUND: this ultrasound is done between 18 to 22 weeks of pregnancy and looks VERY closely at all of your babys parts and pieces to look for signs of any abnormal development; ultrasound is not perfect and CANNOT detect ALL types of birth defects (especially Down Syndrome) - but it is a very helpful test     * SEQUENTIAL SCREENING: this is actually a combination of tests which include an ultrasound measuring the back of your baby's neck between 11-13 weeks and bloodwork from you at that time and again between 15-22 weeks; this test can help tell us the risk for your baby to be affected by certain chromosome abnormalities or birth defects  * QUAD SCREEN: this test is done with just bloodwork from you between 15-22 weeks of pregnancy; it can help tell us the risk for your baby to be affected by certain chromosome abnormalities or birth defects  * CELL-FREE DNA SCREEN: this test is done with just bloodwork from you any time after 10 weeks of pregnancy; it is very accurate at telling us if your baby has a high chance for Down syndrome or other chromosome abnormalities but is generally reserved for women who have a high-risk factor for a baby with a chromosome abnormality  * MATERNAL SERUM ALPHA-FETO PROTEIN SCREEN:  this test is done with just bloodwork from you between 15-22 weeks of pregnancy; it helps us get an idea if your baby is developing a birth defect like spina bifida  * AMNIOCENTESIS: this test involves using a very thin needle inserted into your uterus to take out a little bit of the fluid around your baby for testing; it can be performed any time after 16 weeks of pregnancy; this test tells us for certain if your baby has particular birth defects (especially chromosome abnormalities); there is a very small risk for miscarriage to occur when you have this testing performed; this test is generally reserved for women who have a high-risk factor for a baby with an abnormality         Women who have a higher risk for babies to develop abnormally (sometimes called a birth defect) include women with the following:   · Age 28 years or older   · Obesity at the time of conception   · Diabetes at the time of conception   · A previous child with a birth defect   · Taking medications which may cause a birth defect     Here are some important questions to ask yourself when deciding which (if any) of these tests you want to have performed  · Do I want to know before birth if my baby has a birth defect? · What will I do with this information if the result shows a high chance for a birth defect? · How would learning about a birth defect help me make choices about my pregnancy? · Will these tests help me feel more reassured or just more anxious and afraid? Medications that are safe to take     Here is a list of medications which are safe for you to take during pregnancy without having to discuss with the nurse practitioner or physician:     * Tylenol/Acetaminophen (headache or fever)   * Benadryl/Diphenhydramine (allergies, runny nose, difficult sleeping, itching)   * Claritin/Loratidine (allergies, runny nose)   * Zyrtec/Cetirizine (allergies, runny nose)   * Allegra/Fexofenadine (allergies)  * Robitussin/Guaifenesin (coughing)   * Sudafed/Pseudoephedrine (runny nose/congestion)   * Colace/Docusate sodium (constipation)   * Maalox/Magnesium hydroxide (heartburn, indigestion)   * Zantac/Ranitidine (heartburn, indigestion)   * Pepcid/Famotidine (heartburn, indigestion)   * Tums/Calcium carbonate (heartburn, nausea)   * Kaopectate/Bismuth subsalicylate (diarrhea)   * Immodium/Loperamide (diarrhea)   * Vitamin B6/Pyrodoxine(nausea)   * Doxylamine/Unisom (nausea, insomnia)     Any other medications should be discussed with either our nurse practitioner or one of our physicians before taking  Nutrition in Pregnancy  Good Nutrition is a VERY important part of having a healthy pregnancy and healthy baby    You should follow a healthy diet which include the following:   · Vegetables (which are dark green and leafy): at least 2 servings each day   · Protein (meat, eggs, beans, nuts, peanut butter): 3-4 servings each day   · Breads/whole grains (bread, pasta, rice, tortillas, potatoes): 3 servings each day   · Dairy (milk, yogurt, cheese): 3-4 servings each day   · Water: 6-8 glasses per day   · Calories: approximately 2000 to 2200 calories per day     Weight Gain   Recommended weight gain for you during your pregnancy is based on your body mass index (BMI) at the time that you became pregnant  Pre-pregnant BMI Recommended weight gain   Underweight (BMI less than 18 5) 28 to 40 pounds   Normal weight (BMI 18 5-24 9) 25 to 35 pounds   Overweight (BMI 25-29 9) 15 to 25 pounds   Obese (BMI 30 or greater) 11 to 20 pounds     Food safety   It is VERY important to eat only safely-prepared foods during pregnancy as you and your baby have a higher risk than usual for being affected by foodborne illnesses  Follow these steps to ensure that you and your baby are safe from foodborne illnesses while you are pregnant:   · wash hands thoroughly with warm water and soap before and after handling any foods   · wash cutting boards, dishes, utensils, and countertops with hot water and soap before and after preparing any foods   · rinse raw fruits and vegetables thoroughly under running water before eating   · keep raw meat and seafood separate from other foods and use different cutting boards/utensils to handle raw meat than for other foods   · put cooked food on a freshly clean plate   · cook all of your foods thoroughly   · discard foods that have been left out for more than 2 hours   · refrigerate or freeze any foods than can spoil     There are three particular foodborne risks that you should be aware of and avoid as they can cause serious harm to your unborn child       * Listeria (a harmful bacteria)   · dont eat hot dogs or deli meats (unless theyre reheated until steaming hot)   · dont eat soft cheeses (such as Feta, Brie, Camembert) unless they are specifically labeled as being made with pasteurized milk   · dont drink raw (unpasteurized) milk   · dont eat refrigerated pates or meat spreads   · dont eat refrigerated smoked seafood unless its in a cooked dish like a casserole     * Mercury (a metal which is found in certain fish in high levels)   · dont eat shark, tilefish, saadia mackerel, or swordfish   · dont eat more than 12 ounces per week of shrimp, salmon, pollock, or catfish   · when eating tuna fish, you can have up to 6 ounces per week of canned albacore tuna OR up to 12 ounces of canned light tuna     * Toxoplasma (a harmful parasite)   · cook meat thoroughly before eating   · wear gloves when gardening or handling sand from a childs sandbox   · if you ha tve a cat, have someone else change the litter box while you are pregnant  · if you HAVE to clean it yourself, be sure to wash your hands thoroughly afterwards with warm water and soap  · dont get a NEW cat while you are pregnant          Exercise and Pregnancy     Exercise has many benefits for you  It:   · Improves your posture and appearance   · Relieves back pain   · Improves circulation   · Increases flexibility   · Decreases stress   · Helps you feel good and gives you a better self-image   · Gives you energy   · Helps you sleep   · Strengthens and stretches your muscles, which can help ease the pregnancy discomforts   · Increases your stamina and flexibility     The healthier you are going into labor, the faster and easier your recovery will be after birth  Exercise may be good for your baby too  Both of you feel calm and happy after a workout  Also, your unborn baby likes the motion  How much time to exercise:   Check with your health provider before you begin and make sure you are healthy enough to start  If you exercised before pregnancy, it is safe to do moderate exercise of 30 minutes or more every day  If not, start with 20 minutes a day, 3 times a week  If you were jogging before your pregnancy, it is safe to continue    If you arent used to jogging, pregnancy is not a good time to start  Your First Trimester   In the first three months you might feel tired and sick to your stomach  Only exercise when you feel up to it  But, even a little bit of activity can boost your energy  Consider a walk, stretch, or some yoga  Your Second Trimester   You might have more energy, so take advantage of the times when youre feeling good to do activities you enjoy  Safe exercises include low-impact aerobics (which elevate your heart rate), easy dancing, walking, swimming, stationary cycling, easy cross-country skiing, and yoga  Go easy on high-impact sports like running, tennis, or sports that could cause you to fall, like downhill skiing or horseback riding  Your Third Trimester   You might feel more tired and have backaches from the extra weight you are carrying  Your third trimester is an important time to use good posture, bend at the knees to pick things up, and not lift any heavy objects  Safety Rules   · Always stretch before and after working out  · Never workout so hard that you cant talk at the same time  · Dont exercise in very hot or very cold weather  · Drink plenty of water before, during and after exercise  · Be aware of your comfort level - stop what youre doing if you have pain, if you feel faint, or if youre becoming exhausted  · Avoid lying flat on your back after the first trimester as this position can decrease blood flow to your uterus  NAUSEA AND VOMITING IN PREGNANCY    1  Eat meals and snacks slowly  2  Eat every 1-2 hours to avoid a full stomach  3  Don't skip meals, avoid empty stomach  4  Eat a snack prior to getting out of bed  5  Avoid food and beverages with a strong smell  6  Avoid dehydration - drink enough fluid to keep your urine pale yellow  7  Drink fluids before a meal to minimize the effect of a full stomach  8  Limit the amount of coffee and beverages that contain caffeine    9  Eliminate spicy, odorous, high fat (fried foods), acidic (tomato products), and sweet foods  10  Fluid that contain lemon, mint, or orange can usually be well-tolerated  11  Snacks and meals that contain low-fat protein (lean meats, fish, poultry and eggs) along with eating easily digestible carbs (fruit, rice, toast, crackers and dry cereal) may be tolerated better  12  Foods with tyler may be well-tolerated  Try using tyler root powder, capsules, or extract (up to 1000mg per day)  13  Drink liquids in small amounts  14  Try taking Vitamin B6 (50mg at bedtime, 25mg in the morning, and 25mg in the afternoon) with Unisom/Doxylamine (25mg at bedtime)  15  If nausea and vomiting persist, please contact the office  SEX AND PREGNANCY    1  Is sex safe during pregnancy? Sex is usually perfectly safe throughout pregnancy and does not hurt your growing baby  [place] and orgasms are fine unless you have a medical problem  If you are concerned, discuss it with your healthcare provider  2  How often is sex OK? Frequent sex should not hurt the baby if you are having a healthy pregnancy  3  Does sex cause miscarriage? There are no facts that link orgasms and miscarriage  However, if you have a history of miscarriage, your provider may caution you against sex and orgasm in your first trimester  4  What if I feel the baby move after sex? Is that a bad sign? No   The baby is well-protected in your uterus  And, the baby often moves a lot no matter what you do  5  Should my partner be putting weight on my abdomen? No, not as you get bigger  Find new positions for lovemaking as your pregnancy goes along  Try lying on your side or you be the one on top  Don't lie flat on your back  6  Can sex cause me to go into labor early? Not normally  However, orgasms cause the uterus to have mild contractions if you are near labor    If you have a history of early labor, your provider might warn you against intercourse and orgasm  Also, nipple stimulation causes your uterus to contract if you are near labor  Ask your provider if this is safe for you  7   Are all kinds of sex safe during pregnancy? No   Never let your partner blow air into your vagina  Do not put any object in your vagina that could cause injury or infection  If you have heavy bleeding or your water breaks during sex, stop and call your provider immediately  8  What if I don't feel like having sex? Be open about it with your partner  In early pregnancy, you may feel too tired or be sick to your stomach  In the last weeks of pregnancy, your physical changes may make you feel too large or too awkward for sex  You may also be thinking more about motherhood than sex by this time  However, in your middle months, you may actually want sex more often than usual   If your partner doesn't want sex for fear of hurting you or the baby, be reassuring that sex is safe and natural during pregnancy  9  Are there times I should avoid sex? Yes, if:  · Sex is painful  · You have unexplained vaginal bleeding  · Your have a low-lying placenta or placenta previa  · You have  labor  · Your water breaks  · You are pregnancy with twins or triplets  · You or your partner heave an unhealed herpes sore or any STD  · You can't get into a comfortable position            WARNING SIGNS DURING PREGNANCY  Call our office at [phone] for any of the followin  Vaginal bleeding  2  Sharp abdominal pain that does not go away  3  Fever (more than 100 4 and is not relieved by Tylenol)  4  Persistent vomiting lasting greater than 24 hours  5  Chest pain   6  Pain or burning when you urinate  7  Severe headache that doesn't resolve with Tylenol  8  Blurred vision or seeing spots in your vision  9  Sudden swelling of your face or hands  10  Redness, swelling or pain in a leg  11  A sudden weight gain in just a few days  12   Decrease in your baby's movement (after 28 weeks or the 6th month of pregnancy)  13  A loss of watery fluid from your vagina - can be a gush, a trickle or continuous wetness  14  After 20 weeks of pregnancy, rhythmic cramping (greater than 4 per hour) or menstrual like low/pelvic pain            BREASTFEEDING     BENEFITS FOR BABIES   * stronger immune systems (less allergies, eczema, asthma, and childhood cancers)   * less diarrhea and constipation or other GI diseases   * fewer colds and ear infections   * better vision and teeth (fewer cavities)   * improves IQ   * lower rates of diabetes and obesity in childhood     BENEFITS FOR MOMS   · promotes faster weight loss after delivery   · lower risk for postpartum depression   · lower risk for breast, uterine, and ovarian cancers   · lower risk for osteoporosis developing with age   · easier than formula - is always right with you, clean, and the right temperature   · less expensive than formulaits FREE !!!!     KEYS TO SUCCESSFUL BREASTFEEDING   · keep baby skin-to-skin until after first feeding event   · keep baby in your room with you during your hospital stay after delivery   · avoid any bottle feedings (unless medically necessary)   · limit the use of pacifiers and swaddling   · ask for help if you are having any issueslactation consultants (who specialize in breastfeeding) are available to help you   · a healthy diet for momeating a variety of foods and portions in moderation    THINGS YOU SHOULD KNOW ABOUT BREASTFEEDING   · most medications are considered compatible with breastfeeding by the [address] [place] [address] [employer], but you should check with your health care provider or lactation consultant prior to taking a new medicationjust to be sure it is safe   · alcohol (beer, wine, liquor) can be passed from mother to baby through breast milkan occasional, social drink is deemed acceptable by the [employer] of [place] [employer]   more than that should be avoided   · breastfeeding is NOT an effective method of birth control   · nicotine (in cigarettes) can pass from mother to baby through breast milk   however, for mothers who smoke, it is still healthier to breastfeed than use formula   · caffeine should be limited to 1-3 cups per dayincludes coffee, soda, energy drinks           VACCINES IN PREGNANCY    TDAP  Whopping cough (or pertusSsis) can be serious for anyone, but for your , it can be life-threatning  Up to 20 babies die each year in the [place]  Due to whopping cough  About half of babies younger than 3year old who get whopping cough need treatment in the hospital   The younger the baby is when he or she gets whopping cough, the more likely he or she will need to be treated in a hospital   When you receive the whopping cough vaccine (Tdap) during your pregnancy, your body will create protective antibodies and pass some of them to your baby before birth  These antibodies can help protect your baby from getting whopping cough until they are old enough to be vaccinated themselves (usually around 7 months of age)  INFLUENZA  Changes in your immune, heart, and lung functions during pregnancy make you more likely to get seriously ill from the flu  Catching the flu also increases your chances for serious problems for your developing baby, including premature labor and delivery  It is recommended that all women who are pregnant during flu season should receive an influenza vaccine  SEAT BELT USE IN PREGNANCY    Whether you are pregnant or not, you should wear a seat belt EVERY time you are in a car  A seat belt is the best protection for both you AND your unborn child  To use a seat belt properly while you are pregnant, you may need to adjust the front seat several times as you grow so that there is always at least 10 inches between the center of your chest and the steering wheel or dashboard, yet still be able to comfortably reach the pedals    The shoulder belt should lay across your chest (between your breasts) and away from your neck  The lap belt should be secured BELOW your belly so that it fits snugly across your hips and pelvic bone  You should NOT disable the air bag on your vehicle  Seat belts and air bags work best when used together to protect you and your unborn child

## 2022-03-21 NOTE — LETTER
Proof of Pregnancy Letter    Anjum Paiz  1990  [address] [place] [id number]        03/21/22      Anjum Paiz is a patient at our facility  Anjum Paiz Estimated Date of Delivery:10/2/2022       Any questions or concerns, please feel free to contact our office      Sincerely,     Mark Rock

## 2022-03-22 ENCOUNTER — ROUTINE PRENATAL (OUTPATIENT)
Dept: PERINATAL CARE | Facility: CLINIC | Age: 32
End: 2022-03-22
Payer: COMMERCIAL

## 2022-03-22 VITALS
SYSTOLIC BLOOD PRESSURE: 123 MMHG | HEART RATE: 85 BPM | DIASTOLIC BLOOD PRESSURE: 80 MMHG | HEIGHT: 60 IN | BODY MASS INDEX: 26.9 KG/M2 | WEIGHT: 137 LBS

## 2022-03-22 DIAGNOSIS — Z3A.12 12 WEEKS GESTATION OF PREGNANCY: ICD-10-CM

## 2022-03-22 DIAGNOSIS — O09.291 HX OF PREECLAMPSIA, PRIOR PREGNANCY, CURRENTLY PREGNANT, FIRST TRIMESTER: Primary | ICD-10-CM

## 2022-03-22 DIAGNOSIS — Z36.82 ENCOUNTER FOR ANTENATAL SCREENING FOR NUCHAL TRANSLUCENCY: ICD-10-CM

## 2022-03-22 LAB
BACTERIA UR CULT: ABNORMAL
BACTERIA UR CULT: ABNORMAL
HIV 1+2 AB+HIV1 P24 AG SERPL QL IA: NORMAL
RPR SER QL: NORMAL

## 2022-03-22 PROCEDURE — 76813 OB US NUCHAL MEAS 1 GEST: CPT | Performed by: OBSTETRICS & GYNECOLOGY

## 2022-03-22 PROCEDURE — 99241 PR OFFICE CONSULTATION NEW/ESTAB PATIENT 15 MIN: CPT | Performed by: OBSTETRICS & GYNECOLOGY

## 2022-03-22 NOTE — LETTER
March 23, 2022     [facility]  [address] [facility]  [place]  [address]  [id number]    Patient: Yaquelin Maxwell   YOB: 1990   Date of Visit: 3/22/2022       Dear Dr Cleveland Elizabeth:    Thank you for referring Yaquelin Maxwell to me for evaluation  Below are my notes for this consultation  If you have questions, please do not hesitate to call me  I look forward to following your patient along with you  Sincerely,        Lindsay Mak MD        CC: No Recipients  Lindsay Mak MD  3/22/2022  1:20 PM  Sign when Signing Visit  Please refer to the [facility] ultrasound report in Ob Procedures for additional information regarding today's visit

## 2022-03-22 NOTE — PROGRESS NOTES
Patient chose to use [facility] and was given lab slip for the Noninvasive Prenatal Screen -[facility] Qnatal Advanced  Blood sample is drawn at [facility] and online appointment scheduling and lab locations can be found @ www  [facility]     Qnatal  card given, patient instructed how to check her out- of -pocket responsibility @ [facility]  Patient aware that  is provided by third party and is only an estimate of cost ,not a guarantee  For definitive cost, patient encouraged to call her insurance provider- insurance phone # located on the back of her ID card  Some Insurance plans may require prior authorization before blood is drawn  Patient instructed to check with her plan before she goes to lab and notify [facility]  Patient made aware she may be responsible for full cost of test if lab drawn before prior authorization obtained  Insurance Authorization may take up to 14 business days, patient made aware insurance authorization does not mean test is covered 100%  Information on how to check OOP NIPT coverage/ check if a prior authorization needed for NIPT was also provided to patient during the appointment scheduling of her [facility] NT appt  Patient made aware Qnatal results typically are available in 7-10 business days after blood draw and to call [facility] if she does not receive notification of her results  Patient made aware that viewing Qnatal results online will reveal gender  Patient verbalized understanding of all instructions and no questions at this time

## 2022-03-22 NOTE — PROGRESS NOTES
Please refer to the [facility] ultrasound report in Ob Procedures for additional information regarding today's visit

## 2022-03-23 PROBLEM — O99.820 GBS (GROUP B STREPTOCOCCUS CARRIER), +RV CULTURE, CURRENTLY PREGNANT: Status: ACTIVE | Noted: 2022-03-23

## 2022-03-23 NOTE — PROGRESS NOTES
OB/GYN  PRENATAL H&P VISIT  Tio Jamison  3/23/2022  4:08 PM  Dr Mitra Manrique MD      SUBJECTIVE  Tio Jamison is a 28 y o  [id number] at 12w4d here for initial prenatal H&P  This is an unplanned but desired pregnancy  She is currently doing well  She works at [employer] as a   She has a hx of STD/STI  Has a history of positive HSV IgG  Denies any history of outbreaks  Was on valtrex with last pregnancy while inpatient  Discussed with patient the need for Valtrex suppression at 36 weeks  Denies a hx of TB or close contacts with persons with TB  She no had MRSA  She denies a family history of inheritable conditions such as physical or intellectual disabilities, birth defects, blood disorders, heart or neural tube defects  She denies recent travel  Plans to go to [place] in May  She denies  use of nicotine or recreational drug use  She denies use of ETOH  She denies vaginal bleeding, cramping, leakage, abnormal discharge  Previous pregnancy was complicated by preeclampsia with severe features requiring  delivery at 33 weeks  Anticardiolipin, beta-2 glycoprotein antibodies and lupus anticoagulant studies all collected and negative  Currently taking 81 mg aspirin, will increase to 162 mg at recommendation of MFM  OB History    Para Term  AB Living   3 1 0 1 1 1   SAB IAB Ectopic Multiple Live Births   1 0 0 0 1      # Outcome Date GA Lbr Grupo/2nd Weight Sex Delivery Anes PTL Lv   3 Current            2  21 33w1d / 00:09  M Vag-Spont EPI  SUMIT      Name: Tarsha Baird ([address])      Apgar1: 8  Apgar5: 9   1 2013     SAB         Birth Comments: early first trimester SAB       Review of Systems   Constitutional: Negative  HENT: Negative  Eyes: Negative  Respiratory: Negative  Cardiovascular: Negative  Gastrointestinal: Negative  Endocrine: Negative  Genitourinary: Negative  Musculoskeletal: Negative  Allergic/Immunologic: Negative  Neurological: Negative  Hematological: Negative  Psychiatric/Behavioral: Negative  Past Medical History:   Diagnosis Date    Abnormal Pap smear of cervix     Environmental allergies     Genital herpes     Seasonal allergies     Severe pre-eclampsia in third trimester 2021     (spontaneous vaginal delivery) 2021    Varicella        Past Surgical History:   Procedure Laterality Date    NO PAST SURGERIES         Social History     Socioeconomic History    Marital status: Single     Spouse name: Not on file    Number of children: Not on file    Years of education: Not on file    Highest education level: Not on file   Occupational History    Not on file   Tobacco Use    Smoking status: Never Smoker    Smokeless tobacco: Never Used   Vaping Use    Vaping Use: Never used   Substance and Sexual Activity    Alcohol use: Not Currently     Comment: social/prior to pregnancy    Drug use: Never    Sexual activity: Not Currently     Partners: Male   Other Topics Concern    Not on file   Social History Narrative    Not on file     Social Determinants of Health     Financial Resource Strain: Low Risk     Difficulty of Paying Living Expenses: Not hard at all   Food Insecurity: No Food Insecurity    Worried About Running Out of Food in the Last Year: Never true    Ganga of Food in the Last Year: Never true   Transportation Needs: No Transportation Needs    Lack of Transportation (Medical): No    Lack of Transportation (Non-Medical): No   Physical Activity: Not on file   Stress: Not on file   Social Connections: Not on file   Intimate Partner Violence: Not on file   Housing Stability: Not on file       OBJECTIVE  There were no vitals filed for this visit  Physical Exam  Constitutional:       Appearance: Normal appearance  Genitourinary:   Breasts:      Breasts are soft       Right: No inverted nipple, mass, nipple discharge, skin change, tenderness or axillary adenopathy  Left: No inverted nipple, mass, nipple discharge, skin change, tenderness or axillary adenopathy  Cardiovascular:      Rate and Rhythm: Normal rate  Pulmonary:      Effort: Pulmonary effort is normal    Abdominal:      Palpations: Abdomen is soft  Tenderness: There is no abdominal tenderness  Lymphadenopathy:      Upper Body:      Right upper body: No axillary adenopathy  Left upper body: No axillary adenopathy  Neurological:      General: No focal deficit present  Mental Status: She is alert  Skin:     General: Skin is warm and dry  Psychiatric:         Mood and Affect: Mood normal          ASSESSMENT AND PLAN    28 y o , [id number], with LMP 2021 (Exact Date) , at 12w4d here for her prenatal H&P   by doppler    Pregnancy: H&P completed today  PN Labs reviewed today  Discussed GBS on urine culture and need for abx in labor  Patient has rash allergy to Penicillins so will plan to treat with cephalosporins, patient expressed understanding  Labor expectations discussed with patient, including appointment schedule, nutrition, exercise, medications, sexual intercourse, and nausea/vomiting  Patient's BMI is 26 76  Recommended weight gain is 15-25 pounds  Screening: Pap smear in  ASCUS with HPV positive  Had colposcopy at that time  Will follow up results  GC/CT collected  Sequential screening reviewed with patient - will call insurance and decide what she wishes to proceed with  Consents: Delivery process including potential OVD and  reviewed  Sign delivery consent form at 28 weeks  Labor: For analgesia, patient plans on epidural     Contraception: Different methods of contraception were discussed with patient, including progesterone only oral pills, depo provera, nexplanon, mirena, and paragard  Patient does not desire contraception during the postpartum period  Follow up: RTC in 4 weeks   Precautions regarding labor, leakage, bleeding, and fetal movement reviewed  COVID Precautions: reviewed at length, discussed that she is eligible for the vaccine  She is not interested       Joaquín Canas MD  3/23/2022  4:08 PM

## 2022-03-24 ENCOUNTER — ROUTINE PRENATAL (OUTPATIENT)
Dept: OBGYN CLINIC | Facility: CLINIC | Age: 32
End: 2022-03-24

## 2022-03-24 ENCOUNTER — TELEPHONE (OUTPATIENT)
Dept: POSTPARTUM | Facility: CLINIC | Age: 32
End: 2022-03-24

## 2022-03-24 VITALS
SYSTOLIC BLOOD PRESSURE: 114 MMHG | HEART RATE: 86 BPM | BODY MASS INDEX: 26.76 KG/M2 | DIASTOLIC BLOOD PRESSURE: 76 MMHG | WEIGHT: 137 LBS

## 2022-03-24 DIAGNOSIS — Z11.3 SCREENING EXAMINATION FOR STD (SEXUALLY TRANSMITTED DISEASE): Primary | ICD-10-CM

## 2022-03-24 LAB
HGB A MFR BLD: 2.9 % (ref 1.8–3.2)
HGB A MFR BLD: 97.1 % (ref 96.4–98.8)
HGB F MFR BLD: 0 % (ref 0–2)
HGB FRACT BLD-IMP: NORMAL
HGB S MFR BLD: 0 %

## 2022-03-24 PROCEDURE — 87491 CHLMYD TRACH DNA AMP PROBE: CPT

## 2022-03-24 PROCEDURE — 87591 N.GONORRHOEAE DNA AMP PROB: CPT

## 2022-03-24 PROCEDURE — 99214 OFFICE O/P EST MOD 30 MIN: CPT | Performed by: OBSTETRICS & GYNECOLOGY

## 2022-03-24 NOTE — TELEPHONE ENCOUNTER
Spoke with Lesly Falk - she is 12wks pregnant & has 5mth old Manual Abigail she is breastfeeding 4-5x a day - she also pumps at night before going to bed Macario Dueñas sleeps from 7p to 630a)  She is not sure if she should continue to breastfeed - was told at her [facility] appt that she should stop nursing  She would like to continue if its ok  Looking for recommendations/advice on this

## 2022-03-24 NOTE — TELEPHONE ENCOUNTER
Simon Cook does not desire to wean  She reports that she is not considered high risk during this pregnancy  I reviewed with her that weaning is not necessary unless she is at risk for  labor  I encouraged her to discuss her desires with her OB

## 2022-03-25 LAB
C TRACH DNA SPEC QL NAA+PROBE: NEGATIVE
N GONORRHOEA DNA SPEC QL NAA+PROBE: NEGATIVE

## 2022-04-21 ENCOUNTER — ROUTINE PRENATAL (OUTPATIENT)
Dept: OBGYN CLINIC | Facility: CLINIC | Age: 32
End: 2022-04-21

## 2022-04-21 VITALS
HEART RATE: 94 BPM | SYSTOLIC BLOOD PRESSURE: 125 MMHG | BODY MASS INDEX: 27.46 KG/M2 | DIASTOLIC BLOOD PRESSURE: 82 MMHG | WEIGHT: 140.6 LBS

## 2022-04-21 DIAGNOSIS — Z87.59 HISTORY OF SEVERE PRE-ECLAMPSIA: ICD-10-CM

## 2022-04-21 DIAGNOSIS — Z3A.16 16 WEEKS GESTATION OF PREGNANCY: ICD-10-CM

## 2022-04-21 DIAGNOSIS — B00.9 HSV (HERPES SIMPLEX VIRUS) INFECTION: Primary | ICD-10-CM

## 2022-04-21 DIAGNOSIS — O99.820 GBS (GROUP B STREPTOCOCCUS CARRIER), +RV CULTURE, CURRENTLY PREGNANT: ICD-10-CM

## 2022-04-21 PROCEDURE — 99213 OFFICE O/P EST LOW 20 MIN: CPT | Performed by: OBSTETRICS & GYNECOLOGY

## 2022-04-21 NOTE — ASSESSMENT & PLAN NOTE
- 16w4d today   - T 147  - Contraception: none   - Nausea and vomiting improved   -  Next PNV 4 weeks

## 2022-04-21 NOTE — ASSESSMENT & PLAN NOTE
- Will begin Valtrex suppression at 36 weeks gestation   - Patient denies any lesions or hx of outbreak

## 2022-04-21 NOTE — ASSESSMENT & PLAN NOTE
- Hx of  delivery at 33 weeks for PreE with severe features   - BP today 125/82  - Preeclampsia signs/symptoms reviewed  - Continue  mg

## 2022-04-21 NOTE — PROGRESS NOTES
[address]  [id number]  1990        A/P:  Problem List Items Addressed This Visit        Other    HSV (herpes simplex virus) infection - Primary     - Will begin Valtrex suppression at 39 weeks gestation   - Patient denies any lesions or hx of outbreak           History of severe pre-eclampsia     - Hx of  delivery at 33 weeks for PreE with severe features   - BP today 125/82  - Preeclampsia signs/symptoms reviewed  - Continue  mg           GBS (group B Streptococcus carrier), +RV culture, currently pregnant     - Will need abx in labor          16 weeks gestation of pregnancy     - 16w4d today   -   - Contraception: none   - Nausea and vomiting improved   -  Next PNV 4 weeks                    S: 28 y o  [id number] at 16w4d here for PN visit  She denies contractions  She denies leakage of fluid and vaginal bleeding  She has not yet felt fetal movement  Continuing ASA and prenatal vitamins  Reports occasional nausea that has overall improved  O:  Vitals:    22 0810   BP: 125/82   Pulse: 94     Physical Exam  Constitutional:       General: She is not in acute distress  Appearance: Normal appearance  HENT:      Head: Normocephalic and atraumatic  Cardiovascular:      Rate and Rhythm: Normal rate  Pulmonary:      Effort: Pulmonary effort is normal    Abdominal:      Palpations: Abdomen is soft  Tenderness: There is no abdominal tenderness  Skin:     General: Skin is warm and dry  Neurological:      General: No focal deficit present  Mental Status: She is alert     Psychiatric:         Mood and Affect: Mood normal          Fetal Heart Rate: 147    D/w Dr Viola Sharma MD  OB/GYN PGY-1  2022  8:25 AM

## 2022-04-30 ENCOUNTER — OFFICE VISIT (OUTPATIENT)
Dept: URGENT CARE | Facility: MEDICAL CENTER | Age: 32
End: 2022-04-30
Payer: COMMERCIAL

## 2022-04-30 ENCOUNTER — NURSE TRIAGE (OUTPATIENT)
Dept: OTHER | Facility: OTHER | Age: 32
End: 2022-04-30

## 2022-04-30 VITALS
TEMPERATURE: 97.3 F | SYSTOLIC BLOOD PRESSURE: 130 MMHG | BODY MASS INDEX: 27.34 KG/M2 | OXYGEN SATURATION: 99 % | RESPIRATION RATE: 18 BRPM | DIASTOLIC BLOOD PRESSURE: 81 MMHG | HEART RATE: 83 BPM | WEIGHT: 140 LBS

## 2022-04-30 DIAGNOSIS — R30.0 DYSURIA: Primary | ICD-10-CM

## 2022-04-30 LAB
SL AMB  POCT GLUCOSE, UA: ABNORMAL
SL AMB LEUKOCYTE ESTERASE,UA: ABNORMAL
SL AMB POCT BILIRUBIN,UA: ABNORMAL
SL AMB POCT BLOOD,UA: ABNORMAL
SL AMB POCT CLARITY,UA: CLEAR
SL AMB POCT COLOR,UA: YELLOW
SL AMB POCT KETONES,UA: ABNORMAL
SL AMB POCT NITRITE,UA: ABNORMAL
SL AMB POCT PH,UA: 6.5
SL AMB POCT SPECIFIC GRAVITY,UA: 1
SL AMB POCT URINE PROTEIN: ABNORMAL
SL AMB POCT UROBILINOGEN: 0.2

## 2022-04-30 PROCEDURE — 99213 OFFICE O/P EST LOW 20 MIN: CPT | Performed by: PHYSICIAN ASSISTANT

## 2022-04-30 PROCEDURE — 87086 URINE CULTURE/COLONY COUNT: CPT | Performed by: PHYSICIAN ASSISTANT

## 2022-04-30 NOTE — TELEPHONE ENCOUNTER
Regarding: UTI  ----- Message from Ric Holiday sent at 4/30/2022  8:16 AM EDT -----  "I have a burning sensation when I pee, and I pee frequently "

## 2022-04-30 NOTE — PROGRESS NOTES
[facility]        NAME: Demarco Esposito is a 28 y o  female  : 1990    MRN: [id number]  DATE: 2022  TIME: 3:54 PM    Assessment and Plan   Dysuria [R30 0]  1  Dysuria  POCT urine dip auto non-scope    Urine culture         Patient Instructions     Explained to the patient that since her symptoms are intermittent and her urine showed small amount of leukocytes weight to the culture came back  She is to follow-up with her PCP in 2-3 days unless her symptoms increase sooner  Increase fluids  Chief Complaint     Chief Complaint   Patient presents with    Female Dysuria     Patient c/o dysuria that has been intermittent x 3 days   Patient reports that she is 18 weeks pregnant  History of Present Illness       Patient is 18 weeks pregnant  Patient states she had dysuria yesterday and then this morning but this has resolved when she has been drinking a lot of fluids  Difficulty Urinating   This is a new problem  The current episode started yesterday  The problem occurs intermittently  The problem has been resolved  The quality of the pain is described as burning  The patient is experiencing no pain  There has been no fever  She is sexually active  There is no history of pyelonephritis  Associated symptoms include a possible pregnancy  Pertinent negatives include no chills, discharge, flank pain, frequency, hematuria, hesitancy, nausea, sweats, urgency or vomiting  She has tried nothing for the symptoms  Review of Systems   Review of Systems   Constitutional: Negative for chills  Gastrointestinal: Negative for nausea and vomiting  Genitourinary: Positive for dysuria  Negative for flank pain, frequency, hematuria, hesitancy and urgency  All other systems reviewed and are negative          Current Medications       Current Outpatient Medications:     acetaminophen (TYLENOL) 325 mg tablet, Take 2 tablets (650 mg total) by mouth every 6 (six) hours as needed for mild pain, headaches or fever, Disp: , Rfl: 0    aspirin (ECOTRIN LOW STRENGTH) 81 mg EC tablet, Take 81 mg by mouth daily, Disp: , Rfl:     Prenatal MV-Min-Fe Fum-FA-DHA (PRENATAL 1 PO), Take by mouth, Disp: , Rfl:     Current Allergies     Allergies as of 2022 - Reviewed 2022   Allergen Reaction Noted    Penicillins Rash 2014            The following portions of the patient's history were reviewed and updated as appropriate: allergies, current medications, past family history, past medical history, past social history, past surgical history and problem list      Past Medical History:   Diagnosis Date    Abnormal Pap smear of cervix     Environmental allergies     Genital herpes     Seasonal allergies     Severe pre-eclampsia in third trimester 2021     (spontaneous vaginal delivery) 2021    Varicella        Past Surgical History:   Procedure Laterality Date    NO PAST SURGERIES         Family History   Problem Relation Age of Onset    Breast cancer Mother 46    No Known Problems Father     No Known Problems Maternal Grandmother     No Known Problems Paternal Grandmother     Heart attack Paternal Grandfather     No Known Problems Sister     No Known Problems Brother     No Known Problems Son     Colon cancer Neg Hx     Ovarian cancer Neg Hx          Medications have been verified  Objective   /81   Pulse 83   Temp (!) 97 3 °F (36 3 °C)   Resp 18   Wt 63 5 kg (140 lb)   LMP 2021 (Exact Date)   SpO2 99%   BMI 27 34 kg/m²   Patient's last menstrual period was 2021 (exact date)  Physical Exam     Physical Exam  Vitals and nursing note reviewed  Constitutional:       Appearance: Normal appearance  She is normal weight  Cardiovascular:      Rate and Rhythm: Normal rate and regular rhythm  Pulses: Normal pulses  Heart sounds: Normal heart sounds     Pulmonary:      Effort: Pulmonary effort is normal       Breath sounds: Normal breath sounds  Abdominal:      General: Bowel sounds are normal       Palpations: Abdomen is soft  Tenderness: There is no abdominal tenderness (Suprapubic)  There is no right CVA tenderness or left CVA tenderness  Neurological:      Mental Status: She is alert

## 2022-04-30 NOTE — TELEPHONE ENCOUNTER
Patient complaint of urinary urgency, frequency, painful urination, and yellow vaginal discharge  Reason for Disposition   Unusual vaginal discharge (e g , bad smelling, yellow, green, or foamy-white)    Answer Assessment - Initial Assessment Questions  1  SEVERITY: "How bad is the pain?"  (e g , Scale 1-10; mild, moderate, or severe)    - MILD (1-3): complains slightly about urination hurting    - MODERATE (4-7): interferes with normal activities      - SEVERE (8-10): excruciating, unwilling or unable to urinate because of the pain       Mild- Mod  2  FREQUENCY: "How many times have you had painful urination today?"       Urinating 3 times hourly  3  PATTERN: "Is pain present every time you urinate or just sometimes?"       Sometimes  4  ONSET: "When did the painful urination start?"       Thursday   5  FEVER: "Do you have a fever?" If Yes, ask: "What is your temperature, how was it measured, and when did it start?"      Denies  6  PAST UTI: "Have you had a urine infection before?" If Yes, ask: "When was the last time?" and "What happened that time?"       Years ago, none this pregnancy  7  CAUSE: "What do you think is causing the painful urination?"  (e g , UTI, scratch, Herpes sore)      UTI  8  OTHER SYMPTOMS: "Do you have any other symptoms?" (e g , flank pain, vaginal discharge, genital sores, urgency, blood in urine)      Vaginal discharge- yellow, urgency, frequency,   9  PREGNANCY: "Is there any chance you are pregnant?" "When was your last menstrual period?"      Yes, 17,6 weeks      Protocols used: URINATION PAIN [facility]

## 2022-05-01 LAB — BACTERIA UR CULT: NORMAL

## 2022-05-13 ENCOUNTER — OFFICE VISIT (OUTPATIENT)
Dept: FAMILY MEDICINE CLINIC | Facility: CLINIC | Age: 32
End: 2022-05-13

## 2022-05-13 VITALS
OXYGEN SATURATION: 98 % | SYSTOLIC BLOOD PRESSURE: 120 MMHG | HEART RATE: 79 BPM | BODY MASS INDEX: 27.54 KG/M2 | TEMPERATURE: 97.8 F | WEIGHT: 141 LBS | RESPIRATION RATE: 19 BRPM | DIASTOLIC BLOOD PRESSURE: 76 MMHG

## 2022-05-13 DIAGNOSIS — Z00.00 ANNUAL PHYSICAL EXAM: ICD-10-CM

## 2022-05-13 DIAGNOSIS — L20.82 FLEXURAL ECZEMA: Primary | ICD-10-CM

## 2022-05-13 PROCEDURE — 99395 PREV VISIT EST AGE 18-39: CPT | Performed by: FAMILY MEDICINE

## 2022-05-13 RX ORDER — TRIAMCINOLONE ACETONIDE 0.25 MG/G
OINTMENT TOPICAL 2 TIMES DAILY
Qty: 80 G | Refills: 1 | Status: SHIPPED | OUTPATIENT
Start: 2022-05-13 | End: 2022-07-14

## 2022-05-13 NOTE — PROGRESS NOTES
[facility]    NAME: Montserrat Carlin  AGE: 28 y o  SEX: female  : 1990     DATE: 2022     Assessment and Plan:     Problem List Items Addressed This Visit    None     Visit Diagnoses     Flexural eczema    -  Primary    Relevant Medications    triamcinolone (KENALOG) 0 025 % ointment    Annual physical exam            Use gloves at work  Avoid using hand     Immunizations and preventive care screenings were discussed with patient today  Appropriate education was printed on patient's after visit summary  Counseling:  · Exercise: the importance of regular exercise/physical activity was discussed  Recommend exercise 3-5 times per week for at least 30 minutes  No follow-ups on file  Chief Complaint:     Chief Complaint   Patient presents with   [facility] Patient Visit     Np here today stating that she have a rash in the rt hand but it when away  History of Present Illness:     Adult Annual Physical   Patient here for a comprehensive physical exam  The patient reports problems - rash on hands  Diet and Physical Activity  · Diet/Nutrition: well balanced diet  · Exercise: no formal exercise  Depression Screening  PHQ-2/9 Depression Screening    Little interest or pleasure in doing things: 0 - not at all  Feeling down, depressed, or hopeless: 0 - not at all  PHQ-2 Score: 0  PHQ-2 Interpretation: Negative depression screen       General Health  · Sleep: sleeps well  · Hearing: normal - bilateral   · Vision: no vision problems  · Dental: regular dental visits  /GYN Health  · Last menstrual period: 19 weeks pregnant  · Contraceptive method: currently pregnant  · History of STDs?: no      Review of Systems:     Review of Systems   Skin: Positive for rash  All other systems reviewed and are negative       Past Medical History:     Past Medical History:   Diagnosis Date    Abnormal Pap smear of cervix     Environmental allergies     Genital herpes     Seasonal allergies     Severe pre-eclampsia in third trimester 2021     (spontaneous vaginal delivery) 2021    Varicella       Past Surgical History:     Past Surgical History:   Procedure Laterality Date    NO PAST SURGERIES        Social History:     Social History     Socioeconomic History    Marital status: Single     Spouse name: None    Number of children: None    Years of education: None    Highest education level: None   Occupational History    None   Tobacco Use    Smoking status: Never Smoker    Smokeless tobacco: Never Used   Vaping Use    Vaping Use: Never used   Substance and Sexual Activity    Alcohol use: Not Currently     Comment: social/prior to pregnancy    Drug use: Never    Sexual activity: Not Currently     Partners: Male   Other Topics Concern    None   Social History Narrative    None     Social Determinants of Health     Financial Resource Strain: Low Risk     Difficulty of Paying Living Expenses: Not hard at all   Food Insecurity: No Food Insecurity    Worried About Running Out of Food in the Last Year: Never true    Ganga of Food in the Last Year: Never true   Transportation Needs: No Transportation Needs    Lack of Transportation (Medical): No    Lack of Transportation (Non-Medical):  No   Physical Activity: Not on file   Stress: Not on file   Social Connections: Not on file   Intimate Partner Violence: Not on file   Housing Stability: Not on file      Family History:     Family History   Problem Relation Age of Onset    Breast cancer Mother 46    No Known Problems Father     No Known Problems Maternal Grandmother     No Known Problems Paternal Grandmother     Heart attack Paternal Grandfather     No Known Problems Sister     No Known Problems Brother     No Known Problems Son     Colon cancer Neg Hx     Ovarian cancer Neg Hx       Current Medications: Current Outpatient Medications   Medication Sig Dispense Refill    triamcinolone (KENALOG) 0 025 % ointment Apply topically 2 (two) times a day 80 g 1    acetaminophen (TYLENOL) 325 mg tablet Take 2 tablets (650 mg total) by mouth every 6 (six) hours as needed for mild pain, headaches or fever  0    aspirin (ECOTRIN LOW STRENGTH) 81 mg EC tablet Take 81 mg by mouth daily      Prenatal MV-Min-Fe Fum-FA-DHA (PRENATAL 1 PO) Take by mouth       No current facility-administered medications for this visit  Allergies: Allergies   Allergen Reactions    Penicillins Rash      Physical Exam:     /76 (BP Location: Left arm, Patient Position: Sitting, Cuff Size: Adult)   Pulse 79   Temp 97 8 °F (36 6 °C) (Temporal)   Resp 19   Wt 64 kg (141 lb)   LMP 12/13/2021 (Exact Date)   SpO2 98%   BMI 27 54 kg/m²     Physical Exam  Vitals and nursing note reviewed  Constitutional:       General: She is not in acute distress  Appearance: She is well-developed  HENT:      Head: Normocephalic and atraumatic  Eyes:      Conjunctiva/sclera: Conjunctivae normal    Cardiovascular:      Rate and Rhythm: Normal rate and regular rhythm  Heart sounds: No murmur heard  Pulmonary:      Effort: Pulmonary effort is normal  No respiratory distress  Breath sounds: Normal breath sounds  Abdominal:      Palpations: Abdomen is soft  Tenderness: There is no abdominal tenderness  Musculoskeletal:      Cervical back: Neck supple  Skin:     General: Skin is warm and dry  Findings: Rash present  Comments: Rash on both hands    Neurological:      Mental Status: She is alert            MD Mat Cole

## 2022-05-13 NOTE — PATIENT INSTRUCTIONS

## 2022-05-14 PROBLEM — Z3A.20 20 WEEKS GESTATION OF PREGNANCY: Status: ACTIVE | Noted: 2022-04-21

## 2022-05-14 NOTE — ASSESSMENT & PLAN NOTE
Hx of  delivery at 33 weeks for PreE w/ SF   BP Today 141/85, repeat 124/80  Preeclampsia signs/symptoms reviewed, denies any at this time   Continue  mg

## 2022-05-14 NOTE — PROGRESS NOTES
[address]  [id number]  1990        A/P:  Problem List Items Addressed This Visit        Other    HSV (herpes simplex virus) infection     Will begin valtrex suppression at 39 weeks of gestation   Patient denies any lesions or hx of outbreak            History of severe pre-eclampsia - Primary     Hx of  delivery at 33 weeks for PreE w/ SF   BP Today 141/85, repeat 124/80  Preeclampsia signs/symptoms reviewed, denies any at this time   Continue  mg            20 weeks gestation of pregnancy     20w4d today   FHT 140s  Contraception:   Nausea and vomiting improved  Sign delivery consent at 26 weeks  Next PNV 4 weeks                      S: 28 y o  [id number] 20w4d here for PN visit  She denies contractions  She denies leakage of fluid and vaginal bleeding  She reports that she has started to feel fetal movement  Denies any headache, vision changes, shortness of breath, epigastric or RUQ pain  Elevated BP on arrival however patient reports she was rushing to get to appointment  Repeat BP wnl  Does not wish to know sex of baby but excited for upcoming ultrasound  Reports nausea and vomiting has resolved  O:  Vitals:    22 0855   BP: 141/85   Pulse: (!) 118     Physical Exam  Constitutional:       General: She is not in acute distress  Appearance: Normal appearance  Cardiovascular:      Rate and Rhythm: Normal rate  Pulmonary:      Effort: Pulmonary effort is normal    Abdominal:      General: Abdomen is flat  Neurological:      Mental Status: She is alert           Fetal Heart Rate: 146      D/w Dr Aria Mccollum MD  OB/GYN PGY-1  2022  9:12 AM

## 2022-05-14 NOTE — ASSESSMENT & PLAN NOTE
20w4d today   FHT 140s  Contraception:   Nausea and vomiting improved  Sign delivery consent at 26 weeks  Next PNV 4 weeks

## 2022-05-14 NOTE — ASSESSMENT & PLAN NOTE
Will begin valtrex suppression at 36 weeks of gestation   Patient denies any lesions or hx of outbreak

## 2022-05-19 ENCOUNTER — ROUTINE PRENATAL (OUTPATIENT)
Dept: OBGYN CLINIC | Facility: CLINIC | Age: 32
End: 2022-05-19

## 2022-05-19 VITALS
DIASTOLIC BLOOD PRESSURE: 80 MMHG | WEIGHT: 144 LBS | HEART RATE: 118 BPM | SYSTOLIC BLOOD PRESSURE: 124 MMHG | BODY MASS INDEX: 28.12 KG/M2

## 2022-05-19 DIAGNOSIS — B00.9 HSV (HERPES SIMPLEX VIRUS) INFECTION: ICD-10-CM

## 2022-05-19 DIAGNOSIS — Z3A.20 20 WEEKS GESTATION OF PREGNANCY: ICD-10-CM

## 2022-05-19 DIAGNOSIS — Z87.59 HISTORY OF SEVERE PRE-ECLAMPSIA: Primary | ICD-10-CM

## 2022-05-19 PROCEDURE — 99213 OFFICE O/P EST LOW 20 MIN: CPT | Performed by: OBSTETRICS & GYNECOLOGY

## 2022-05-19 NOTE — PATIENT INSTRUCTIONS
Thank you for your confidence in our team    We appreciate you and welcome your feedback  If you receive a survey from us, please take a few moments to let us know how we are doing     Sincerely,  Leonard Camp MD

## 2022-05-31 ENCOUNTER — ROUTINE PRENATAL (OUTPATIENT)
Dept: PERINATAL CARE | Facility: OTHER | Age: 32
End: 2022-05-31
Payer: COMMERCIAL

## 2022-05-31 VITALS
SYSTOLIC BLOOD PRESSURE: 129 MMHG | HEART RATE: 98 BPM | DIASTOLIC BLOOD PRESSURE: 85 MMHG | HEIGHT: 60 IN | BODY MASS INDEX: 28.35 KG/M2 | WEIGHT: 144.4 LBS

## 2022-05-31 DIAGNOSIS — O35.8XX0 FETAL RENAL ANOMALY, SINGLE GESTATION: Primary | ICD-10-CM

## 2022-05-31 DIAGNOSIS — Z3A.22 22 WEEKS GESTATION OF PREGNANCY: ICD-10-CM

## 2022-05-31 DIAGNOSIS — Z36.86 ENCOUNTER FOR ANTENATAL SCREENING FOR CERVICAL LENGTH: ICD-10-CM

## 2022-05-31 PROBLEM — O35.EXX0 FETAL RENAL ANOMALY, SINGLE GESTATION: Status: ACTIVE | Noted: 2022-05-31

## 2022-05-31 PROCEDURE — 76811 OB US DETAILED SNGL FETUS: CPT | Performed by: OBSTETRICS & GYNECOLOGY

## 2022-05-31 PROCEDURE — 76817 TRANSVAGINAL US OBSTETRIC: CPT | Performed by: OBSTETRICS & GYNECOLOGY

## 2022-05-31 PROCEDURE — 99213 OFFICE O/P EST LOW 20 MIN: CPT | Performed by: OBSTETRICS & GYNECOLOGY

## 2022-05-31 NOTE — PROGRESS NOTES
The patient was seen today for an ultrasound  Please see ultrasound report (located under Ob Procedures) for additional details  Thank you very much for allowing us to participate in the care of this very nice patient  Should you have any questions, please do not hesitate to contact me  Darell Delaney MD [address] [facility]  Attending Physician, Allison

## 2022-05-31 NOTE — PROGRESS NOTES
Ultrasound Probe Disinfection    A transvaginal ultrasound was performed  Prior to use, disinfection was performed with High Level Disinfection Process (Trophon)  Probe serial number [id number]: [id number] was used        Мария Mack RDMS  05/31/22  12:56 PM

## 2022-06-03 ENCOUNTER — NURSE TRIAGE (OUTPATIENT)
Dept: OTHER | Facility: OTHER | Age: 32
End: 2022-06-03

## 2022-06-03 NOTE — TELEPHONE ENCOUNTER
On call provider contacted, pt provider may continue taking ASA  Take tylenol for pain  Avoid cough medications with pseudoephedrine  Go to ED if develop s/s of SOB  Pt informed and care advice given

## 2022-06-03 NOTE — TELEPHONE ENCOUNTER
Regardin Weeks and 5 days pregnant running a fever of 100 7 my mom did tested psoitive for covid   ----- Message from Jessie Orozco sent at 6/3/2022  4:16 PM EDT -----  '' I am 22 weeks 5 days pregnant I'm running a fever of 100 7, my mom tested positive for covid, looking for medical advice  ''

## 2022-06-03 NOTE — TELEPHONE ENCOUNTER
Reason for Disposition   HIGH RISK for severe COVID complications (e g , weak immune system, age > 59 years, obesity with BMI > 22, pregnant, chronic lung disease or other chronic medical condition)  (Exception: Already seen by PCP and no new or worsening symptoms )    Answer Assessment - Initial Assessment Questions  1  COVID-19 DIAGNOSIS: "Who made your COVID-19 diagnosis?" "Was it confirmed by a positive lab test or self-test?" If not diagnosed by a doctor (or NP/PA), ask "Are there lots of cases (community spread) where you live?" Note: See public health department website, if unsure  Not dx    2  COVID-19 EXPOSURE: "Was there any known exposure to COVID before the symptoms began?" CDC Definition of close contact: within 6 feet (2 meters) for a total of 15 minutes or more over a 24-hour period  Tuesday    3  ONSET: "When did the COVID-19 symptoms start?"       6/3    4  WORST SYMPTOM: "What is your worst symptom?" (e g , cough, fever, shortness of breath, muscle aches)      Fever    5  COUGH: "Do you have a cough?" If Yes, ask: "How bad is the cough?"        Denies    6  FEVER: "Do you have a fever?" If Yes, ask: "What is your temperature, how was it measured, and when did it start?"      100 7 under tongue    7  RESPIRATORY STATUS: "Describe your breathing?" (e g , shortness of breath, wheezing, unable to speak)       Denies    8  BETTER-SAME-WORSE: "Are you getting better, staying the same or getting worse compared to yesterday?"  If getting worse, ask, "In what way?"      Worse    9  HIGH RISK DISEASE: "Do you have any chronic medical problems?" (e g , asthma, heart or lung disease, weak immune system, obesity, etc )      Denies    10  VACCINE: "Have you had the COVID-19 vaccine?" If Yes, ask: "Which one, how many shots, when did you get it?"        Denies    11  BOOSTER: "Have you received your COVID-19 booster?" If Yes, ask: "Which one and when did you get it?"        Denies    12   PREGNANCY: "Is there any chance you are pregnant?" "When was your last menstrual period?"        22w5d    13  OTHER SYMPTOMS: "Do you have any other symptoms?"  (e g , chills, fatigue, headache, loss of smell or taste, muscle pain, sore throat)        Chills    14   O2 SATURATION MONITOR:  "Do you use an oxygen saturation monitor (pulse oximeter) at home?" If Yes, ask "What is your reading (oxygen level) today?" "What is your usual oxygen saturation reading?" (e g , 95%)        taina    Protocols used: CORONAVIRUS (COVID-19) DIAGNOSED OR SUSPECTED-ADULT-

## 2022-06-15 PROBLEM — Z3A.24 24 WEEKS GESTATION OF PREGNANCY: Status: ACTIVE | Noted: 2022-05-31

## 2022-06-16 ENCOUNTER — ROUTINE PRENATAL (OUTPATIENT)
Dept: OBGYN CLINIC | Facility: CLINIC | Age: 32
End: 2022-06-16

## 2022-06-16 VITALS
DIASTOLIC BLOOD PRESSURE: 79 MMHG | SYSTOLIC BLOOD PRESSURE: 117 MMHG | HEART RATE: 111 BPM | HEIGHT: 60 IN | WEIGHT: 144 LBS | BODY MASS INDEX: 28.27 KG/M2

## 2022-06-16 DIAGNOSIS — Z3A.24 24 WEEKS GESTATION OF PREGNANCY: ICD-10-CM

## 2022-06-16 DIAGNOSIS — B00.9 HSV (HERPES SIMPLEX VIRUS) INFECTION: ICD-10-CM

## 2022-06-16 DIAGNOSIS — Z87.59 HISTORY OF SEVERE PRE-ECLAMPSIA: ICD-10-CM

## 2022-06-16 DIAGNOSIS — O35.8XX0 FETAL RENAL ANOMALY, SINGLE GESTATION: Primary | ICD-10-CM

## 2022-06-16 PROBLEM — O98.512 COVID-19 AFFECTING PREGNANCY IN SECOND TRIMESTER: Status: ACTIVE | Noted: 2022-06-16

## 2022-06-16 PROBLEM — U07.1 COVID-19 AFFECTING PREGNANCY IN SECOND TRIMESTER: Status: ACTIVE | Noted: 2022-06-16

## 2022-06-16 PROCEDURE — 99213 OFFICE O/P EST LOW 20 MIN: CPT | Performed by: OBSTETRICS & GYNECOLOGY

## 2022-06-16 NOTE — PROGRESS NOTES
[address]  [id number]  1990        A/P:  Problem List Items Addressed This Visit        Genitourinary    Fetal renal anomaly, single gestation - Primary     Bilateral UTD noted at 22 weeks   Repeat scan at 28 weeks               Other    HSV (herpes simplex virus) infection     Will begin valtrex suppression at 36 weeks of gestation   Patient denies any lesions or hx of outbreak            History of severe pre-eclampsia     Hx of  delivery at 33 weeks for PreE w/ SF   BP Today 117/79  Preeclampsia signs/symptoms reviewed, denies any at this time   Continue  mg            24 weeks gestation of pregnancy     24w4d today    bpm  Fundus 24 cm  Contraception: Undecided at this time   Sign delivery consent at 26-28 weeks  28 week labs ordered today   Next PNV 4 weeks                      S: 28 y o  [id number] 24w4d here for PN visit  She denies contractions  She denies leakage of fluid and vaginal bleeding  She reports good fetal movement  Agusto Carroll reports that she feels well at this time  Was diagnosed with COVID just over two weeks ago  Reports that her symptoms were mainly chills and vomiting  States that all of her symptoms have improved with the exception of that she still cannot taste things  Was previously planning on sex of baby being a surprise however recently was upset and decided to find out  Baby is a girl and she is very excited  Preeclampsia precautions reviewed, denies any signs or symptoms at this time  Bilateral renal pyelectasis/UTD noted on 22 week scan, ordered for repeat around 28 weeks  O:  Vitals:    22 0833   BP: 117/79   Pulse: (!) 111     Physical Exam  Constitutional:       General: She is not in acute distress  Appearance: Normal appearance  Cardiovascular:      Rate and Rhythm: Normal rate  Pulmonary:      Effort: Pulmonary effort is normal    Abdominal:      Palpations: Abdomen is soft  Tenderness: There is no abdominal tenderness  Skin:     General: Skin is warm and dry  Neurological:      General: No focal deficit present  Mental Status: She is alert     Psychiatric:         Mood and Affect: Mood normal          Fetal Heart Rate: 141  Fundal Height (cm): 24 cm    D/w Dr Melba Campbell MD  OB/GYN PGY-1  6/16/2022  8:58 AM

## 2022-06-16 NOTE — ASSESSMENT & PLAN NOTE
Diagnosed with COVID two weeks ago   Reports symptoms were mild and mainly consisted of chills and vomiting   Still experiencing ageusia   Reports all other symptoms improved

## 2022-06-16 NOTE — ASSESSMENT & PLAN NOTE
Hx of  delivery at 33 weeks for PreE w/ SF   BP Today 117/79  Preeclampsia signs/symptoms reviewed, denies any at this time   Continue  mg

## 2022-06-16 NOTE — ASSESSMENT & PLAN NOTE
24w4d today    bpm  Fundus 24 cm  Contraception: Undecided at this time   Sign delivery consent at 26-28 weeks  28 week labs ordered today   Next PNV 4 weeks

## 2022-07-05 ENCOUNTER — TELEPHONE (OUTPATIENT)
Dept: OBGYN CLINIC | Facility: CLINIC | Age: 32
End: 2022-07-05

## 2022-07-10 NOTE — PROGRESS NOTES
Please refer to the [facility] ultrasound report in Ob Procedures for additional information regarding today's visit

## 2022-07-11 ENCOUNTER — APPOINTMENT (OUTPATIENT)
Dept: LAB | Facility: HOSPITAL | Age: 32
End: 2022-07-11
Payer: COMMERCIAL

## 2022-07-11 DIAGNOSIS — Z3A.24 24 WEEKS GESTATION OF PREGNANCY: ICD-10-CM

## 2022-07-11 LAB
ABO GROUP BLD: NORMAL
BASOPHILS # BLD AUTO: 0.07 THOUSANDS/ΜL (ref 0–0.1)
BASOPHILS NFR BLD AUTO: 1 % (ref 0–1)
BLD GP AB SCN SERPL QL: NEGATIVE
EOSINOPHIL # BLD AUTO: 0.35 THOUSAND/ΜL (ref 0–0.61)
EOSINOPHIL NFR BLD AUTO: 3 % (ref 0–6)
ERYTHROCYTE [DISTWIDTH] IN BLOOD BY AUTOMATED COUNT: 13.4 % (ref 11.6–15.1)
FERRITIN SERPL-MCNC: 3 NG/ML (ref 8–388)
HCT VFR BLD AUTO: 32 % (ref 34.8–46.1)
HGB BLD-MCNC: 10 G/DL (ref 11.5–15.4)
IMM GRANULOCYTES # BLD AUTO: 0.04 THOUSAND/UL (ref 0–0.2)
IMM GRANULOCYTES NFR BLD AUTO: 0 % (ref 0–2)
LYMPHOCYTES # BLD AUTO: 2.61 THOUSANDS/ΜL (ref 0.6–4.47)
LYMPHOCYTES NFR BLD AUTO: 25 % (ref 14–44)
MCH RBC QN AUTO: 26.2 PG (ref 26.8–34.3)
MCHC RBC AUTO-ENTMCNC: 31.3 G/DL (ref 31.4–37.4)
MCV RBC AUTO: 84 FL (ref 82–98)
MONOCYTES # BLD AUTO: 0.56 THOUSAND/ΜL (ref 0.17–1.22)
MONOCYTES NFR BLD AUTO: 5 % (ref 4–12)
NEUTROPHILS # BLD AUTO: 6.93 THOUSANDS/ΜL (ref 1.85–7.62)
NEUTS SEG NFR BLD AUTO: 66 % (ref 43–75)
NRBC BLD AUTO-RTO: 0 /100 WBCS
PLATELET # BLD AUTO: 339 THOUSANDS/UL (ref 149–390)
PMV BLD AUTO: 11.3 FL (ref 8.9–12.7)
RBC # BLD AUTO: 3.82 MILLION/UL (ref 3.81–5.12)
RH BLD: POSITIVE
SPECIMEN EXPIRATION DATE: NORMAL
WBC # BLD AUTO: 10.56 THOUSAND/UL (ref 4.31–10.16)

## 2022-07-11 PROCEDURE — 82728 ASSAY OF FERRITIN: CPT

## 2022-07-11 PROCEDURE — 85025 COMPLETE CBC W/AUTO DIFF WBC: CPT

## 2022-07-11 PROCEDURE — 86850 RBC ANTIBODY SCREEN: CPT

## 2022-07-11 PROCEDURE — 36415 COLL VENOUS BLD VENIPUNCTURE: CPT

## 2022-07-11 PROCEDURE — 86900 BLOOD TYPING SEROLOGIC ABO: CPT

## 2022-07-11 PROCEDURE — 86901 BLOOD TYPING SEROLOGIC RH(D): CPT

## 2022-07-11 PROCEDURE — 86592 SYPHILIS TEST NON-TREP QUAL: CPT

## 2022-07-12 ENCOUNTER — ULTRASOUND (OUTPATIENT)
Dept: PERINATAL CARE | Facility: OTHER | Age: 32
End: 2022-07-12
Payer: COMMERCIAL

## 2022-07-12 VITALS
HEART RATE: 108 BPM | WEIGHT: 152.6 LBS | HEIGHT: 60 IN | BODY MASS INDEX: 29.96 KG/M2 | SYSTOLIC BLOOD PRESSURE: 126 MMHG | DIASTOLIC BLOOD PRESSURE: 86 MMHG

## 2022-07-12 DIAGNOSIS — Z36.4 ULTRASOUND FOR ANTENATAL SCREENING FOR FETAL GROWTH RESTRICTION: ICD-10-CM

## 2022-07-12 DIAGNOSIS — Z3A.28 28 WEEKS GESTATION OF PREGNANCY: Primary | ICD-10-CM

## 2022-07-12 DIAGNOSIS — O43.199 BILOBED PLACENTA: ICD-10-CM

## 2022-07-12 DIAGNOSIS — O09.293 HX OF PREECLAMPSIA, PRIOR PREGNANCY, CURRENTLY PREGNANT, THIRD TRIMESTER: ICD-10-CM

## 2022-07-12 DIAGNOSIS — O35.EXX0 ENCOUNTER FOR REPEAT ULTRASOUND OF FETAL PYELECTASIS, ANTEPARTUM, SINGLE OR UNSPECIFIED FETUS: ICD-10-CM

## 2022-07-12 DIAGNOSIS — U07.1 COVID-19 AFFECTING PREGNANCY IN SECOND TRIMESTER: ICD-10-CM

## 2022-07-12 DIAGNOSIS — O98.512 COVID-19 AFFECTING PREGNANCY IN SECOND TRIMESTER: ICD-10-CM

## 2022-07-12 LAB — RPR SER QL: NORMAL

## 2022-07-12 PROCEDURE — 76816 OB US FOLLOW-UP PER FETUS: CPT | Performed by: OBSTETRICS & GYNECOLOGY

## 2022-07-12 PROCEDURE — 99213 OFFICE O/P EST LOW 20 MIN: CPT | Performed by: OBSTETRICS & GYNECOLOGY

## 2022-07-12 NOTE — LETTER
July 12, 2022     [facility]  [address] [facility]  [place]  [address]  [id number]    Patient: Nasir Abad   YOB: 1990   Date of Visit: 7/12/2022       Dear Dr Mery Boswell:    Thank you for referring Nasir Abad to me for evaluation  Below are my notes for this consultation  If you have questions, please do not hesitate to call me  I look forward to following your patient along with you  Sincerely,        Zachary Campos MD        CC: No Recipients  Zachary Campos MD  7/10/2022  6:34 PM  Sign when Signing Visit  Please refer to the [facility] ultrasound report in Ob Procedures for additional information regarding today's visit

## 2022-07-12 NOTE — PATIENT INSTRUCTIONS
Kick Counts in Pregnancy   WHAT YOU NEED TO KNOW:   Kick counts measure how much your baby is moving in your womb  A kick from your baby can be felt as a twist, turn, swish, roll, or jab  It is common to feel your baby kicking at 26 to 28 weeks of pregnancy  You may feel your baby kick as early as 20 weeks of pregnancy  You may want to start counting at 28 weeks  DISCHARGE INSTRUCTIONS:   Contact your doctor immediately if:   You feel a change in the number of kicks or movements of your baby  You feel fewer than 10 kicks within 2 hours  You have questions or concerns about your baby's movements  Why measure kick counts:  Your baby's movement may provide information about your baby's health  He or she may move less, or not at all, if there are problems  Your baby may move less if he or she is not getting enough oxygen or nutrition from the placenta  Do not smoke while you are pregnant  Smoking decreases the amount of oxygen that gets to your baby  Talk to your healthcare provider if you need help to quit smoking  Tell your healthcare provider as soon as you feel a change in your baby's movements  When to measure kick counts:   Measure kick counts at the same time every day  Measure kick counts when your baby is awake and most active  Your baby may be most active in the evening  How to measure kick counts:  Check that your baby is awake before you measure kick counts  You can wake up your baby by lightly pushing on your belly, walking, or drinking something cold  Your healthcare provider may tell you different ways to measure kick counts  You may be told to do the following:  Use a chart or clock to keep track of the time you start and finish counting  Sit in a chair or lie on your left side  Place your hands on the largest part of your belly  Count until you reach 10 kicks  Write down how much time it takes to count 10 kicks  It may take 30 minutes to 2 hours to count 10 kicks  It should not take more than 2 hours to count 10 kicks  Follow up with your doctor as directed:  Write down your questions so you remember to ask them during your visits  © Copyright [employer] 2022 Information is for End User's use only and may not be sold, redistributed or otherwise used for commercial purposes  All illustrations and images included in CareNotes® are the copyrighted property of A D A M , Inc  or Formerly named [facility] [place]   The above information is an  only  It is not intended as medical advice for individual conditions or treatments  Talk to your doctor, nurse or pharmacist before following any medical regimen to see if it is safe and effective for you

## 2022-07-13 ENCOUNTER — TELEPHONE (OUTPATIENT)
Dept: LABOR AND DELIVERY | Facility: HOSPITAL | Age: 32
End: 2022-07-13

## 2022-07-13 DIAGNOSIS — D50.9 IRON DEFICIENCY ANEMIA DURING PREGNANCY: Primary | ICD-10-CM

## 2022-07-13 DIAGNOSIS — O99.019 IRON DEFICIENCY ANEMIA DURING PREGNANCY: Primary | ICD-10-CM

## 2022-07-13 RX ORDER — FERROUS SULFATE TAB EC 324 MG (65 MG FE EQUIVALENT) 324 (65 FE) MG
324 TABLET DELAYED RESPONSE ORAL
Qty: 60 TABLET | Refills: 0 | Status: SHIPPED | OUTPATIENT
Start: 2022-07-13 | End: 2022-08-11

## 2022-07-13 RX ORDER — DOCUSATE SODIUM 100 MG/1
100 CAPSULE, LIQUID FILLED ORAL 2 TIMES DAILY
Qty: 60 CAPSULE | Refills: 0 | Status: SHIPPED | OUTPATIENT
Start: 2022-07-13 | End: 2022-08-29

## 2022-07-13 NOTE — TELEPHONE ENCOUNTER
Called to notify patient of Hgb of 10 0 and low ferritin level  Rx sent for Fe supplementation and colace  Patient expressed understanding       Isadora Weir MD  Obstetrics & Gynecology PGY-2  7/13/2022  5:38 PM

## 2022-07-14 ENCOUNTER — APPOINTMENT (OUTPATIENT)
Dept: LAB | Facility: CLINIC | Age: 32
End: 2022-07-14
Payer: COMMERCIAL

## 2022-07-14 ENCOUNTER — ROUTINE PRENATAL (OUTPATIENT)
Dept: OBGYN CLINIC | Facility: CLINIC | Age: 32
End: 2022-07-14

## 2022-07-14 VITALS
HEART RATE: 94 BPM | BODY MASS INDEX: 29.61 KG/M2 | SYSTOLIC BLOOD PRESSURE: 123 MMHG | HEIGHT: 60 IN | DIASTOLIC BLOOD PRESSURE: 78 MMHG | WEIGHT: 150.8 LBS

## 2022-07-14 DIAGNOSIS — Z34.93 PRENATAL CARE IN THIRD TRIMESTER: Primary | ICD-10-CM

## 2022-07-14 DIAGNOSIS — Z3A.24 24 WEEKS GESTATION OF PREGNANCY: ICD-10-CM

## 2022-07-14 DIAGNOSIS — Z3A.28 28 WEEKS GESTATION OF PREGNANCY: ICD-10-CM

## 2022-07-14 LAB
DME PARACHUTE DELIVERY DATE REQUESTED: NORMAL
DME PARACHUTE ITEM DESCRIPTION: NORMAL
DME PARACHUTE ORDER STATUS: NORMAL
DME PARACHUTE SUPPLIER NAME: NORMAL
DME PARACHUTE SUPPLIER PHONE: NORMAL
GLUCOSE 1H P 50 G GLC PO SERPL-MCNC: 164 MG/DL (ref 40–134)

## 2022-07-14 PROCEDURE — 36415 COLL VENOUS BLD VENIPUNCTURE: CPT

## 2022-07-14 PROCEDURE — 82950 GLUCOSE TEST: CPT

## 2022-07-14 PROCEDURE — 99213 OFFICE O/P EST LOW 20 MIN: CPT | Performed by: NURSE PRACTITIONER

## 2022-07-14 NOTE — PROGRESS NOTES
Brisa Staton presents today for routine OB visit at 28w4d  Blood Pressure: 123/78  Wt=68 4 kg (150 lb 12 8 oz); Body mass index is 29 45 kg/m² ; TWG=8 981 kg (19 lb 12 8 oz)  Fetal Heart Rate: 136; Fundal Height (cm): 28 cm  Abdomen: gravid, soft, non-tender  She reports no complaints  Denies uterine contractions  Denies vaginal bleeding or leaking of fluid  Reports adequate fetal movement of at least 10 movements in 2 hours once daily  Scheduled for ultrasound 8/29/22  Will go to lab after today's to have 1h glucola performed  Declines Tdap  Reviewed premature labor precautions and fetal kick counts  Advised to continue medications and return in 2 weeks        Current Outpatient Medications   Medication Instructions    aspirin (ECOTRIN LOW STRENGTH) 81 mg, Oral, Daily    docusate sodium (COLACE) 100 mg, Oral, 2 times daily    ferrous sulfate 324 mg, Oral, 2 times daily before meals    Prenatal MV-Min-Fe Fum-FA-DHA (PRENATAL 1 PO) Oral

## 2022-07-14 NOTE — PATIENT INSTRUCTIONS
Thank you for your confidence in our team    We appreciate you and welcome your feedback  If you receive a survey from us, please take a few moments to let us know how we are doing  Sincerely,  JIN Kebede         The Third Trimester  (28-42 weeks)  YOUR BABY   * your baby sucks its thumb now! * your baby can hear voices and respond to touch   so talk to him or her!!   * your babys brain grows and develops most in the last 2 months of pregnancy   * babys head and bones are soft and flexible so they can fit through the birth canal   * babys movements change towards the end of pregnancy because there is less room for kicking and stretching in your belly   * babys lungs are not fully developed and completely ready to breathe on their own until the last 3-4 weeks before your due date    YOUR BODY   * your belly is growing a lot now   * it may become more difficult to sleep well at night or to be as active as you usually are   * you may sweat more than usual   * you will become more off-balancebe careful not to fall!!   * you may develop hemorrhoids (which can be painful and make it difficult to sit down)   * the last two months of pregnancy can become very uncomfortablewith backaches, headaches, and heartburn   * you can start to have contractions  as long as they are irregular and less than 5 per hour, this is a normal part of your body getting ready to have a baby   * your cervix may start to thin out and open upto get ready for delivery   * you may find yourself needing to pee very often  because baby is pressing on your bladder so much   * you may get out of breathe more quickly than usual      FETAL KICK COUNTS    In the third trimester (after 28 weeks gestation) you should be performing fetal kick counts every day  Your baby should move at least 10 times in 2 hours during an active time, once a day  Choose atime of day when your baby is most active    Try to do this around the same time each day  Get into a comfortable position and then write down the time your baby first moves  Count each movement until the baby moves 10 times  These movements include kicks, punches, nudges, flutters, or rolls  This can take anywhere from 5 minutes to 2 hours  Write down the time you feel the baby's 10th movement  If 2 hours has passed and your baby has not moved at least 10 times, you should CALL THE OFFICE RIGHT AWAY  [phone]  PREMATURE LABOR     When to call [phone]:  * I need to call immediately if I have even a small amount of LIQUID leaking from my vagina, with or without contractions  * I need to call if I am BLEEDING from my vagina  * I need to call if I am feeling CRAMPING that continues after drinking 2-3 glasses of water and lying down on my side for one hour and that feels like I am having a period  * I need to call if I feel CONTRACTIONS  more than 4 times in an hour that feels like the baby is balling up even after I try drinking 2-3 glasses of water and lying down on my side for an hour  * I need to call if I notice a change in my vaginal DISCHARGE  * I need to call if I am feeling PELVIC PRESSURE  that feels like the baby is pushing down into my vagina and lasts more than an hour  * I need to call if I have LOW BACKACHE which is new and near my tailbone  It may either come and go several times during an hour or stay there constantly  PRE-ECLAMPSIA     What is it? Pre-eclampsia is a serious disease that can occur during pregnancy related to high blood pressures  It can happen to any woman  Why should I care? Women who develop pre-eclampsia have serious risks which can include seizures, stroke, organ damage, premature birth of their baby  In the very worst cases, it can cause death of the mother and/or their baby  What should I pay attention to?    Signs and symptoms of pre-eclampsia can include:   * Severe swelling of face or hands    * A headache that will not go away even after you have taken Tylenol   * Seeing spots or changes in eyesight    * Pain in the upper abdomen or shoulder    * New nausea and vomiting (in the second half of pregnancy)    * Sudden weight gain    * Difficulty breathing     What should I do? If you experience any of the above symptoms of pre-eclampsia, contact your OB provider  Finding pre-eclampsia early is important for you and your baby  Call us at [phone]  Alix Chance       BREASTFEEDING     BENEFITS FOR BABIES   * stronger immune systems (less allergies, eczema, asthma, and childhood cancers)   * less diarrhea and constipation or other GI diseases   * fewer colds and ear infections   * better vision and teeth (fewer cavities)   * improves IQ   * lower rates of diabetes and obesity in childhood     BENEFITS FOR MOMS   * promotes faster weight loss after delivery   * lower risk for postpartum depression   * lower risk for breast, uterine, and ovarian cancers   * lower risk for osteoporosis developing with age   * easier than formula - is always right with you, clean, and the right temperature   * less expensive than formulaits FREE !!!!     KEYS TO SUCCESSFUL BREASTFEEDING   * keep baby skin-to-skin until after first feeding event   * keep baby in your room with you during your hospital stay after delivery   * avoid any bottle feedings (unless medically necessary)   * limit the use of pacifiers and swaddling   * ask for help if you are having any issueslactation consultants (who specialize in breastfeeding) are available to help you   * a healthy diet for momeating a variety of foods and portions in moderation    THINGS YOU SHOULD KNOW ABOUT BREASTFEEDING   * most medications are considered compatible with breastfeeding by the [address] [place] [address] [employer] of [employer], but you should check with your health care provider or lactation consultant prior to taking a new medicationjust to be sure it is safe   * alcohol (beer, wine, liquor) can be passed from mother to baby through breast milkan occasional, social drink is deemed acceptable by the [employer] of [place] [employer]   more than that should be avoided   * breastfeeding is NOT an effective method of birth control   * nicotine (in cigarettes) can pass from mother to baby through breast milk   however, for mothers who smoke, it is still healthier to breastfeed than use formula   * caffeine should be limited to 1-3 cups per dayincludes coffee, soda, energy drinks         PERINEAL / VAGINAL MASSAGE    What can I do now to decrease my chances of tearing during delivery? Massaging around the vaginal opening by you (or your partner), either antepartum (before birth) or during the second stage of labor, can reduce the likelihood of perineal tearing during childbirth  Likewise, the use of warm packs held on the perineum during the pushing stage of labor can reduce the severity of your tear  This will happen during the pushing stage of labor  At home, you can also help reduce the chances of injury that may occur during the birth of your child through perineal massage  When should I do this? Starting around or shortly after 34 weeks of pregnancy, you or your partner should provide 5-10 minutes of vaginal massage 1-4 times per week  How? Use either almond, coconut, or olive oil and water mixture on 1 or 2 fingers (depending on comfort)  Insert finger(s) 3-5cm into the vagina  Apply sweeping downward/sideward pressure from 3 to 9 o'clock for 5-10 minutes, 1-4 times per week  WARNING SIGNS DURING PREGNANCY  Call our office at [phone] if you experience any of the followin  Vaginal bleeding  2  Sharp abdominal pain that does not go away  3  Fever (more than 100 4 and is not relieved by Tylenol)  4  Persistent vomiting lasting greater than 24 hours  5  Chest pain   6  Pain or burning when you urinate  7   Severe headache that doesn't resolve with Tylenol  8  Blurred vision or seeing spots in your vision  9  Sudden swelling of your face or hands  10  Redness, swelling or pain in a leg  11  A sudden weight gain in just a few days  12  Decrease in your baby's movement (after 28 weeks or the 6th month of pregnancy)  13  A loss of watery fluid from your vagina - can be a gush, a trickle or continuous wetness  14  After 20 weeks of pregnancy, rhythmic cramping (greater than 4 per hour) or menstrual like low/pelvic pain          VACCINES IN PREGNANCY    TDAP  Whooping cough (or pertussis) can be serious for anyone, but for your , it can be life-threatning  Up to 20 babies die each year in the [place]  Due to whooping cough  About half of babies younger than 3year old who get whooping cough need treatment in the hospital   The younger the baby is when he or she gets whooping cough, the more likely he or she will need to be treated in a hospital   When you receive the whooping cough vaccine (Tdap) during your pregnancy, your body will create protective antibodies and pass some of them to your baby before birth  These antibodies can help protect your baby from getting whooping cough until they are old enough to be vaccinated themselves (usually around 7 months of age)  INFLUENZA  Changes in your immune, heart, and lung functions during pregnancy make you more likely to get seriously ill from the flu  Catching the flu also increases your chances for serious problems for your developing baby, including premature labor and delivery  It is recommended that all women who are pregnant during flu season should receive an influenza vaccine

## 2022-07-21 ENCOUNTER — TELEPHONE (OUTPATIENT)
Dept: LABOR AND DELIVERY | Facility: HOSPITAL | Age: 32
End: 2022-07-21

## 2022-07-21 DIAGNOSIS — R73.09 ABNORMAL GTT (GLUCOSE TOLERANCE TEST): Primary | ICD-10-CM

## 2022-07-21 NOTE — TELEPHONE ENCOUNTER
Called patient to inform her of 1 hr GTT results and need for 3 hr GTT  Patient expressed understanding  Order placed, all questions answered      Brandyn Saldana MD  Obstetrics & Gynecology PGY-2  7/21/2022  5:43 PM

## 2022-07-28 ENCOUNTER — ROUTINE PRENATAL (OUTPATIENT)
Dept: OBGYN CLINIC | Facility: CLINIC | Age: 32
End: 2022-07-28

## 2022-07-28 VITALS
BODY MASS INDEX: 30.63 KG/M2 | DIASTOLIC BLOOD PRESSURE: 80 MMHG | HEART RATE: 89 BPM | WEIGHT: 156 LBS | SYSTOLIC BLOOD PRESSURE: 121 MMHG | HEIGHT: 60 IN

## 2022-07-28 DIAGNOSIS — Z3A.30 30 WEEKS GESTATION OF PREGNANCY: ICD-10-CM

## 2022-07-28 DIAGNOSIS — Z34.93 PRENATAL CARE IN THIRD TRIMESTER: Primary | ICD-10-CM

## 2022-07-28 PROBLEM — O99.013 ANEMIA AFFECTING PREGNANCY IN THIRD TRIMESTER: Status: ACTIVE | Noted: 2022-07-28

## 2022-07-28 PROBLEM — Z3A.24 24 WEEKS GESTATION OF PREGNANCY: Status: RESOLVED | Noted: 2022-05-31 | Resolved: 2022-07-28

## 2022-07-28 PROCEDURE — 99213 OFFICE O/P EST LOW 20 MIN: CPT | Performed by: OBSTETRICS & GYNECOLOGY

## 2022-07-28 NOTE — PROGRESS NOTES
OB/GYN prenatal visit    S: 28 y o  [id number] 30w4d here for PN visit  She has no obstetric complaints, including pelvic pain, contractions, vaginal bleeding, loss of fluid, or decreased fetal movement       O:  Vitals:    22 0838   BP: 121/80   Pulse: 89       Gen: no acute distress, nonlabored breathing  Fundal Height: S=D  FHR:155bpm    A/P:    Problem List        Musculoskeletal and Integument    Left hamstring muscle strain       Genitourinary    Fetal renal anomaly, single gestation    Overview     Bilateral UTD            Other    Atypical squamous cell changes of undetermined significance (ASCUS) on cervical cytology with positive high risk human papilloma virus (HPV)    Posterior left knee pain    HSV (herpes simplex virus) infection    Overview     Partner with history of HSV, patient with positive IGG on labs 2021 at [facility]         Amenorrhea    History of severe pre-eclampsia    Overview     Hx of  delivery at 33 weeks for PreE w/ SF   Baseline labs wnl  BP Today 121/80  Preeclampsia signs/symptoms reviewed, denies any at this time   Continue  mg          GBS (group B Streptococcus carrier), +RV culture, currently pregnant    Overview     Positive in Previous Pregnancy   Swab at 36 weeks with sensitivity secondary to PCN allergy          30 weeks gestation of pregnancy    Overview     TWG: + 11 3kg (recommended weight gain 25-35lbs)   TDAP vaccine declined  Contraception: condoms  Breastfeeding: breast milk  Birth plan: open to epidural, desires to await spontaneous labor  1hr Glucola elevated->awaiting 3hr   Delivery consent signed          COVID-19 affecting pregnancy in second trimester    Anemia affecting pregnancy in third trimester    Overview     Hgb 10 0 on   Taking oral iron, denies symptoms of anemia  Colace prescribed           Other Visit Diagnoses     Prenatal care in third trimester    -  Primary            Discussed  labor precautions and fetal kick counts Return to clinic in 2 weeks      COVID 19 precautions were discussed with patient at length, reviewed symptoms, hygiene, social distancing, patient to call office 24/7 with questions/concerns      Priya Barnes MD  7/28/2022  9:37 AM

## 2022-08-06 ENCOUNTER — APPOINTMENT (OUTPATIENT)
Dept: LAB | Facility: HOSPITAL | Age: 32
End: 2022-08-06
Payer: COMMERCIAL

## 2022-08-06 DIAGNOSIS — R73.09 ABNORMAL GTT (GLUCOSE TOLERANCE TEST): ICD-10-CM

## 2022-08-06 LAB — GLUCOSE P FAST SERPL-MCNC: 106 MG/DL (ref 65–99)

## 2022-08-06 PROCEDURE — 36415 COLL VENOUS BLD VENIPUNCTURE: CPT

## 2022-08-06 PROCEDURE — 82951 GLUCOSE TOLERANCE TEST (GTT): CPT

## 2022-08-10 ENCOUNTER — ROUTINE PRENATAL (OUTPATIENT)
Dept: OBGYN CLINIC | Facility: CLINIC | Age: 32
End: 2022-08-10

## 2022-08-10 VITALS
HEIGHT: 60 IN | WEIGHT: 158 LBS | BODY MASS INDEX: 31.02 KG/M2 | DIASTOLIC BLOOD PRESSURE: 83 MMHG | HEART RATE: 85 BPM | SYSTOLIC BLOOD PRESSURE: 124 MMHG

## 2022-08-10 DIAGNOSIS — Z3A.32 32 WEEKS GESTATION OF PREGNANCY: ICD-10-CM

## 2022-08-10 DIAGNOSIS — Z34.93 PRENATAL CARE IN THIRD TRIMESTER: Primary | ICD-10-CM

## 2022-08-10 DIAGNOSIS — R73.09 ABNORMAL GTT (GLUCOSE TOLERANCE TEST): ICD-10-CM

## 2022-08-10 PROCEDURE — 99214 OFFICE O/P EST MOD 30 MIN: CPT | Performed by: OBSTETRICS & GYNECOLOGY

## 2022-08-10 NOTE — ASSESSMENT & PLAN NOTE
She reports no problems today  Denies uterine contractions  Denies vaginal bleeding or leaking of fluid  Reports adequate fetal movement of at least 10 movements in 2 hours once daily  BP is within normal limits  Abdomen: gravid, soft, non-tender  Lung and cardiac exams normal, no signs of DVT  Fundal Height: 34  FHR: 142bpm    TDaP refused this visit as well, last received in 2020 because she cut herself  States she does not see a reason to attain, because it is supposed to be every ten years  States she will keep refusing for any pregnancy until itis 10 years since the last one  Counseled  Scheduled for ultrasound 8/29/2022 at [facility]  Provided number in AVS to call MFM to schedule an appointment regarding GDM  Reviewed premature labor precautions and fetal kick counts  Advised to continue medications and return in 2 weeks

## 2022-08-10 NOTE — PROGRESS NOTES
Nasir Abad presents today for routine OB visit at [address] [facility]  Blood Pressure: 124/83  Wt=71 7 kg (158 lb); Body mass index is 30 86 kg/m² ; TWG=12 2 kg (27 lb)    Abnormal GTT (glucose tolerance test)  Referral placed to [facility] for GDM    GBS (group B Streptococcus carrier), +RV culture, currently pregnant  Positive in Previous Pregnancy   Swab at 36 weeks with sensitivity secondary to PCN allergy     32 weeks gestation of pregnancy  She reports no problems today  Denies uterine contractions  Denies vaginal bleeding or leaking of fluid  Reports adequate fetal movement of at least 10 movements in 2 hours once daily  BP is within normal limits  Abdomen: gravid, soft, non-tender  Lung and cardiac exams normal, no signs of DVT  Fundal Height: 34  FHR: 142bpm    TDaP refused this visit as well, last received in 2020 because she cut herself  States she does not see a reason to attain, because it is supposed to be every ten years  States she will keep refusing for any pregnancy until itis 10 years since the last one  Counseled  Scheduled for ultrasound 8/29/2022 at [facility]  Provided number in AVS to call [facility] to schedule an appointment regarding GDM  Reviewed premature labor precautions and fetal kick counts  Advised to continue medications and return in 2 weeks        Current Outpatient Medications   Medication Instructions    aspirin (ECOTRIN LOW STRENGTH) 81 mg, Oral, Daily    docusate sodium (COLACE) 100 mg, Oral, 2 times daily    ferrous sulfate 324 mg, Oral, 2 times daily before meals    Prenatal MV-Min-Fe Fum-FA-DHA (PRENATAL 1 PO) Oral       Pregnancy Problems (from 03/21/22 to present)     Problem Noted Resolved    Anemia affecting pregnancy in third trimester 7/28/2022 by Kiana Redd MD No    Overview Addendum 7/28/2022  9:27 AM by Kiana Redd MD     Hgb 10 0 on 7/11  Taking oral iron, denies symptoms of anemia  Colace prescribed         Previous Version    Fetal renal anomaly, single gestation 5/31/2022 by Mando Matt MD No    Overview Signed 5/31/2022  1:40 PM by Mando Matt MD     Bilateral UTD         30 weeks gestation of pregnancy 4/21/2022 by Christiano Yanes MD No    Overview Addendum 7/28/2022  9:37 AM by Andrea Kasper MD     TWG: + 11 3kg (recommended weight gain 25-35lbs)   TDAP vaccine declined  Contraception: condoms  Breastfeeding: breast milk  Birth plan: open to epidural, desires to await spontaneous labor  1hr Glucola elevated->awaiting 3hr   Delivery consent signed 7/28         Previous Version    GBS (group B Streptococcus carrier), +RV culture, currently pregnant 3/23/2022 by Jessica Campbell MD No    Overview Addendum 6/16/2022  8:52 AM by Christiano Yanes MD     Positive in Previous Pregnancy   Swab at 36 weeks with sensitivity secondary to PCN allergy          Previous Version

## 2022-08-11 DIAGNOSIS — O99.019 IRON DEFICIENCY ANEMIA DURING PREGNANCY: ICD-10-CM

## 2022-08-11 DIAGNOSIS — D50.9 IRON DEFICIENCY ANEMIA DURING PREGNANCY: ICD-10-CM

## 2022-08-11 RX ORDER — FERROUS SULFATE TAB EC 324 MG (65 MG FE EQUIVALENT) 324 (65 FE) MG
TABLET DELAYED RESPONSE ORAL
Qty: 60 TABLET | Refills: 0 | Status: SHIPPED | OUTPATIENT
Start: 2022-08-11 | End: 2022-09-12

## 2022-08-15 ENCOUNTER — NURSE TRIAGE (OUTPATIENT)
Dept: OTHER | Facility: OTHER | Age: 32
End: 2022-08-15

## 2022-08-15 ENCOUNTER — TELEPHONE (OUTPATIENT)
Dept: OBGYN CLINIC | Facility: CLINIC | Age: 32
End: 2022-08-15

## 2022-08-15 ENCOUNTER — HOSPITAL ENCOUNTER (OUTPATIENT)
Facility: HOSPITAL | Age: 32
Discharge: HOME | End: 2022-08-16
Attending: OBSTETRICS & GYNECOLOGY | Admitting: OBSTETRICS & GYNECOLOGY
Payer: COMMERCIAL

## 2022-08-15 VITALS
WEIGHT: 156 LBS | DIASTOLIC BLOOD PRESSURE: 99 MMHG | HEART RATE: 81 BPM | TEMPERATURE: 98.2 F | BODY MASS INDEX: 30.63 KG/M2 | OXYGEN SATURATION: 100 % | SYSTOLIC BLOOD PRESSURE: 141 MMHG | HEIGHT: 60 IN | RESPIRATION RATE: 18 BRPM

## 2022-08-15 DIAGNOSIS — B37.9 YEAST INFECTION: Primary | ICD-10-CM

## 2022-08-15 DIAGNOSIS — B00.9 HSV (HERPES SIMPLEX VIRUS) INFECTION: Primary | ICD-10-CM

## 2022-08-15 PROBLEM — B37.3 YEAST VAGINITIS: Status: ACTIVE | Noted: 2022-08-15

## 2022-08-15 PROBLEM — B37.31 YEAST VAGINITIS: Status: ACTIVE | Noted: 2022-08-15

## 2022-08-15 PROBLEM — Z3A.33 33 WEEKS GESTATION OF PREGNANCY: Status: ACTIVE | Noted: 2022-04-21

## 2022-08-15 RX ORDER — FLUCONAZOLE 200 MG/1
200 TABLET ORAL ONCE
Status: COMPLETED | OUTPATIENT
Start: 2022-08-16 | End: 2022-08-16

## 2022-08-15 RX ORDER — VALACYCLOVIR HYDROCHLORIDE 500 MG/1
1000 TABLET, FILM COATED ORAL ONCE
Status: COMPLETED | OUTPATIENT
Start: 2022-08-16 | End: 2022-08-16

## 2022-08-15 RX ORDER — VALACYCLOVIR HYDROCHLORIDE 1 G/1
1000 TABLET, FILM COATED ORAL 2 TIMES DAILY
Qty: 14 TABLET | Refills: 0 | Status: SHIPPED | OUTPATIENT
Start: 2022-08-15 | End: 2022-08-29

## 2022-08-15 RX ORDER — VALACYCLOVIR HYDROCHLORIDE 500 MG/1
500 TABLET, FILM COATED ORAL 2 TIMES DAILY
Qty: 84 TABLET | Refills: 0 | Status: SHIPPED | OUTPATIENT
Start: 2022-08-15 | End: 2022-09-11

## 2022-08-15 RX ORDER — FLUCONAZOLE 150 MG/1
150 TABLET ORAL ONCE
Qty: 1 TABLET | Refills: 0 | Status: SHIPPED | OUTPATIENT
Start: 2022-08-15 | End: 2022-08-16

## 2022-08-15 NOTE — TELEPHONE ENCOUNTER
Jaylene Richards called unsure if she has a yeast infection, pt states she never had a yneast infection.  Pt plans to make an appointment for an exam.

## 2022-08-15 NOTE — TELEPHONE ENCOUNTER
Patient states that she thinks that she has a yeast infection she is 33 weeks pregnant and says the itching is very very bad

## 2022-08-16 LAB
ALBUMIN SERPL BCP-MCNC: 2.2 G/DL (ref 3.5–5)
ALP SERPL-CCNC: 280 U/L (ref 46–116)
ALT SERPL W P-5'-P-CCNC: 14 U/L (ref 12–78)
ANION GAP SERPL CALCULATED.3IONS-SCNC: 12 MMOL/L (ref 4–13)
AST SERPL W P-5'-P-CCNC: 13 U/L (ref 5–45)
BILIRUB SERPL-MCNC: <0.1 MG/DL (ref 0.2–1)
BUN SERPL-MCNC: 7 MG/DL (ref 5–25)
CALCIUM ALBUM COR SERPL-MCNC: 10.3 MG/DL (ref 8.3–10.1)
CALCIUM SERPL-MCNC: 8.9 MG/DL (ref 8.3–10.1)
CHLORIDE SERPL-SCNC: 104 MMOL/L (ref 96–108)
CO2 SERPL-SCNC: 21 MMOL/L (ref 21–32)
CREAT SERPL-MCNC: 0.48 MG/DL (ref 0.6–1.3)
CREAT UR-MCNC: 40.6 MG/DL
ERYTHROCYTE [DISTWIDTH] IN BLOOD BY AUTOMATED COUNT: 14.4 % (ref 11.6–15.1)
GFR SERPL CREATININE-BSD FRML MDRD: 132 ML/MIN/1.73SQ M
GLUCOSE SERPL-MCNC: 138 MG/DL (ref 65–140)
HCT VFR BLD AUTO: 30.1 % (ref 34.8–46.1)
HGB BLD-MCNC: 9.6 G/DL (ref 11.5–15.4)
MCH RBC QN AUTO: 25 PG (ref 26.8–34.3)
MCHC RBC AUTO-ENTMCNC: 31.9 G/DL (ref 31.4–37.4)
MCV RBC AUTO: 78 FL (ref 82–98)
PLATELET # BLD AUTO: 289 THOUSANDS/UL (ref 149–390)
PMV BLD AUTO: 12.1 FL (ref 8.9–12.7)
POTASSIUM SERPL-SCNC: 3.6 MMOL/L (ref 3.5–5.3)
PROT SERPL-MCNC: 7.4 G/DL (ref 6.4–8.4)
PROT UR-MCNC: 19 MG/DL
PROT/CREAT UR: 0.47 MG/G{CREAT} (ref 0–0.1)
RBC # BLD AUTO: 3.84 MILLION/UL (ref 3.81–5.12)
SODIUM SERPL-SCNC: 137 MMOL/L (ref 135–147)
WBC # BLD AUTO: 8.86 THOUSAND/UL (ref 4.31–10.16)

## 2022-08-16 PROCEDURE — 85027 COMPLETE CBC AUTOMATED: CPT

## 2022-08-16 PROCEDURE — 80053 COMPREHEN METABOLIC PANEL: CPT

## 2022-08-16 PROCEDURE — 99213 OFFICE O/P EST LOW 20 MIN: CPT

## 2022-08-16 PROCEDURE — 84156 ASSAY OF PROTEIN URINE: CPT

## 2022-08-16 PROCEDURE — 86695 HERPES SIMPLEX TYPE 1 TEST: CPT

## 2022-08-16 PROCEDURE — NC001 PR NO CHARGE: Performed by: OBSTETRICS & GYNECOLOGY

## 2022-08-16 PROCEDURE — 86696 HERPES SIMPLEX TYPE 2 TEST: CPT

## 2022-08-16 PROCEDURE — 82570 ASSAY OF URINE CREATININE: CPT

## 2022-08-16 RX ADMIN — VALACYCLOVIR HYDROCHLORIDE 1000 MG: 500 TABLET, FILM COATED ORAL at 00:02

## 2022-08-16 RX ADMIN — FLUCONAZOLE 200 MG: 200 TABLET ORAL at 00:09

## 2022-08-16 NOTE — TELEPHONE ENCOUNTER
Patient reporting that she thinks she is having a herpes outbreak  Notes white lumpy vaginal discharge, burning, itching, and notes popping some blisters with towel after showering from itching  Notes good fetal movement, denies leaking of fluids, denies fever  Rates pain 10/10  On call provide made aware  Per on call provider have patient come in to be evaluated now       Patient made aware of on call provider advice and [facility]

## 2022-08-16 NOTE — TELEPHONE ENCOUNTER
Reason for Disposition   Rash with painful tiny water blisters    Answer Assessment - Initial Assessment Questions  1  SYMPTOM: "What's the main symptom you're concerned about?" (e g , pain, itching, dryness)      Itching and pain  2  LOCATION: "Where is the  Itching/ pain located?" (e g , inside/outside, left/right)      Vaginal / groin area  3  ONSET: "When did the vaginal itching/ pain start?"  Worsening today   4  PAIN: "Is there any pain?" If Yes, ask: "How bad is it?" (Scale: 1-10; mild, moderate, severe)      10/10  5  ITCHING: "Is there any itching?" If Yes, ask: "How bad is it?" (Scale: 1-10; mild, moderate, severe)     severe  6  CAUSE: "What do you think is causing the discharge?" "Have you had the same problem before? What happened then?"      Patient thinks it is a herpes outbreak   7  OTHER SYMPTOMS: "Do you have any other symptoms?" (e g , fever, itching, vaginal bleeding, pain with urination, injury to genital area, vaginal foreign body)      Itching, vaginal discharge- white lumpy, blisters  8   PREGNANCY: "Is there any chance you are pregnant?" "When was your last menstrual period?"  33w1d    Protocols used: [facility]

## 2022-08-16 NOTE — TELEPHONE ENCOUNTER
Regardin weeks, herpes flare up   ----- Message from Shiv Quintana sent at 8/15/2022  9:58 PM EDT -----  " I am 33 weeks and having my first herpes flare up and its really bad "

## 2022-08-16 NOTE — PROGRESS NOTES
Man Thorpe is a 28 y o  [id number] at 33w1d who presented with vaginal and vulvar itchiness and discharge found to have a primary outbreak of HSV as well as candidal vulvovaginitis       PLAN:     1) Primary HSV infection           A) Valtrex 1000 mg BID for 7 days for acute outbreak followed by 500 mg BID for the remainder of pregnancy           B) Herpes 1/2 IGG collected     2) Candidal vulvovaginitis           A) KOH/WTMT: done with Dr Dorcas Pérez                      Infection:                        - Negative for clue cells                         - Positive for hyphae                          - No trichomonads present           B) Diflucan 200 mg, one time dose given for management      3) Pre-eclampsia without severe features           A) BP elevated on admission (141/99) with a prior elevated BP on 20 weeks 4 days (141/85)  CMP and CBC non-contributary however P/C elevated at 0 47  B) Plan to discuss induction of labor at next prenatal visit     4)Discharge from [facility] triage to home              - Reviewed importance of taking valtrex and the concerns of needing a  if having an active infection at the time of delivery  - Pt to call provider with any concerns and follow up at her next scheduled prenatal appointment               - Continue routine prenatal care              - Case discussed with Dr Robert Martin      SUBJECTIVE:     Man Thorpe 28 y o  [id number] at 33w1d with an Estimated Date of Delivery: 10/2/22 here for new onset severe vaginal itchiness and 10/10 pain  Dia Ferguson reports that the itching and pain started a few days ago and that the itchiness has worsen to the point of needing to use a wash clothe for the itch  Dia Ferguson is also reporting vaginal discharge and a vesicular vulvar rash but denies any vaginal bleeding, contractions, loss of fluids, history of other STIs, N/V or fevers  Of note the patient's  has a history HSV for which he takes no medication   It was dicussed to avoid sexual activity given concern of reinfection and recommended that partner start medical management       Her current obstetrical history is significant for pre-eclampsia, GBS +, anemia, Bilateral fetal renal pyelectasis/UTD noted on 22 week scan, COVID during 2nd trimester      Her past obstetrical history is significant for severe pre-eclampsia requiring delivery at 33 weeks     Contractions: none  Leakage of fluid: none  Vaginal Bleeding: none  Fetal movement: present     OBJECTIVE:         Vitals:     08/15/22 2341   BP: 141/99   Pulse: 81   Resp:  18   Temp:  98 2F   SpO2:  100% on RA         ROS:  Constitutional: Negative  Respiratory: Negative  Cardiovascular: Negative   Gastrointestinal: Negative     General Physical Exam:  Physical Exam:  Physical Exam  Constitutional:       General: She is not in acute distress  Genitourinary:      Vulva is erythematous, with excoriation and possible ruptured vesicles  Vaginal vault  with copious thick white discharge  Cardiovascular:      Rate and Rhythm: Normal rate and regular rhythm  Pulses: Normal pulses  Heart sounds: Normal heart sounds  Pulmonary:      Effort: Pulmonary effort is normal  No respiratory distress  Breath sounds: Normal breath sounds  Abdominal:      Palpations: Abdomen is soft  Tenderness: There is no abdominal tenderness  There is no guarding  Comments: Gravid uterus   Musculoskeletal:         General: No swelling or tenderness  Normal range of motion  Cervical back: Normal range of motion  Right lower leg: No edema  Left lower leg: No edema        Comments: B/l Sachi's sign negative     Fetal monitoring:  FHT:  135 bpm/ moderate variability /15 x 15 accelerations present, no decelerations  [place]: contractions are none     Adriane Valdovinos   PGY 1 FM resident

## 2022-08-17 ENCOUNTER — ROUTINE PRENATAL (OUTPATIENT)
Dept: OBGYN CLINIC | Facility: CLINIC | Age: 32
End: 2022-08-17

## 2022-08-17 VITALS
DIASTOLIC BLOOD PRESSURE: 92 MMHG | WEIGHT: 162 LBS | HEIGHT: 60 IN | BODY MASS INDEX: 31.8 KG/M2 | HEART RATE: 97 BPM | SYSTOLIC BLOOD PRESSURE: 134 MMHG

## 2022-08-17 DIAGNOSIS — Z3A.33 33 WEEKS GESTATION OF PREGNANCY: ICD-10-CM

## 2022-08-17 DIAGNOSIS — N89.8 VAGINAL ITCHING: ICD-10-CM

## 2022-08-17 DIAGNOSIS — Z34.93 PRENATAL CARE IN THIRD TRIMESTER: Primary | ICD-10-CM

## 2022-08-17 LAB
HSV1 IGG SER IA-ACNC: 31.5 INDEX (ref 0–0.9)
HSV2 IGG SER IA-ACNC: <0.91 INDEX (ref 0–0.9)

## 2022-08-17 PROCEDURE — 99213 OFFICE O/P EST LOW 20 MIN: CPT | Performed by: OBSTETRICS & GYNECOLOGY

## 2022-08-17 NOTE — PROGRESS NOTES
Edouard Gurrola presents today for routine OB visit at 33w3d  Blood Pressure: 134/92  Wt=73 5 kg (162 lb); Body mass index is 31 64 kg/m² ; TWG=14 1 kg (31 lb)  Fetal Heart Rate: 147; Fundal Height (cm): 33 cm  Abdomen: gravid, soft, non-tender  She reports being treated for HSV  Denies uterine contractions  Denies vaginal bleeding or leaking of fluid  Reports adequate fetal movement of at least 10 movements in 2 hours once daily  She has diabetes education tomorrow  Scheduled for ultrasound 08/29/22  Reviewed premature labor precautions and fetal kick counts  Advised to continue medications and return in 2 weeks        Current Outpatient Medications   Medication Instructions    aspirin (ECOTRIN LOW STRENGTH) 81 mg, Oral, Daily    docusate sodium (COLACE) 100 mg, Oral, 2 times daily    ferrous sulfate 324 (65 Fe) mg TAKE 1 TABLET BY MOUTH 2 TIMES A DAY BEFORE MEALS    Prenatal MV-Min-Fe Fum-FA-DHA (PRENATAL 1 PO) Oral    valACYclovir (VALTREX) 1,000 mg, Oral, 2 times daily    valACYclovir (VALTREX) 500 mg, Oral, 2 times daily, Start suppression therapy 500mg BID after completion of initial treatment         Pregnancy Problems (from 03/21/22 to present)     Problem Noted Resolved    Anemia affecting pregnancy in third trimester 7/28/2022 by Hola Baker MD No    Overview Addendum 7/28/2022  9:27 AM by Hola Baker MD     Hgb 10 0 on 7/11  Taking oral iron, denies symptoms of anemia  Colace prescribed         Previous Version    Fetal renal anomaly, single gestation 5/31/2022 by Carola Carrington MD No    Overview Signed 5/31/2022  1:40 PM by Carola Carrington MD     Bilateral UTD         33 weeks gestation of pregnancy 4/21/2022 by Estelle Kaiser MD No    Overview Addendum 8/10/2022 10:23 AM by Laurel Castillo MD     TWG: + 11 3kg (recommended weight gain 25-35lbs)   TDAP vaccine declined  Contraception: condoms  Breastfeeding: breast milk  Birth plan: open to epidural, desires to await spontaneous labor  3hr Glucola elevated >>> MFM  Delivery consent signed 7/28         Previous Version    GBS (group B Streptococcus carrier), +RV culture, currently pregnant 3/23/2022 by Bill Lima MD No    Overview Addendum 6/16/2022  8:52 AM by Derrick Astorga MD     Positive in Previous Pregnancy   Swab at 36 weeks with sensitivity secondary to PCN allergy          Previous Version          '

## 2022-08-18 ENCOUNTER — TELEPHONE (OUTPATIENT)
Dept: OBGYN CLINIC | Facility: CLINIC | Age: 32
End: 2022-08-18

## 2022-08-18 ENCOUNTER — TELEMEDICINE (OUTPATIENT)
Dept: PERINATAL CARE | Facility: CLINIC | Age: 32
End: 2022-08-18
Payer: COMMERCIAL

## 2022-08-18 DIAGNOSIS — O24.419 GESTATIONAL DIABETES MELLITUS (GDM) IN THIRD TRIMESTER, GESTATIONAL DIABETES METHOD OF CONTROL UNSPECIFIED: Primary | ICD-10-CM

## 2022-08-18 DIAGNOSIS — Z3A.33 33 WEEKS GESTATION OF PREGNANCY: ICD-10-CM

## 2022-08-18 DIAGNOSIS — O99.013 ANEMIA AFFECTING PREGNANCY IN THIRD TRIMESTER: ICD-10-CM

## 2022-08-18 DIAGNOSIS — R73.09 ABNORMAL GTT (GLUCOSE TOLERANCE TEST): ICD-10-CM

## 2022-08-18 DIAGNOSIS — Z87.59 HISTORY OF SEVERE PRE-ECLAMPSIA: ICD-10-CM

## 2022-08-18 PROCEDURE — G0108 DIAB MANAGE TRN  PER INDIV: HCPCS

## 2022-08-18 RX ORDER — LANCETS 33 GAUGE
EACH MISCELLANEOUS
Qty: 100 EACH | Refills: 2 | Status: SHIPPED | OUTPATIENT
Start: 2022-08-18 | End: 2022-10-02

## 2022-08-18 RX ORDER — BLOOD SUGAR DIAGNOSTIC
STRIP MISCELLANEOUS
Qty: 100 STRIP | Refills: 2 | Status: SHIPPED | OUTPATIENT
Start: 2022-08-18 | End: 2022-09-09

## 2022-08-18 RX ORDER — BLOOD-GLUCOSE METER
EACH MISCELLANEOUS
Qty: 1 KIT | Refills: 0 | Status: SHIPPED | OUTPATIENT
Start: 2022-08-18 | End: 2022-10-02

## 2022-08-18 NOTE — PROGRESS NOTES
CLASS 1 - Individual  (virtual visit)    Thank you for referring your patient to [facility] Diabetes in Pregnancy [facility]    - Referral (8/10/22): R73 09 (ICD-10-CM) - Abnormal GTT (glucose tolerance test)     Subjective:   Yaquelin Maxwell is a  28 y o  female who presents today unaccompanied for Patient Education (Class 1; Not scheduled for Class 2 at this time), Virtual Regular Visit (Epic Embedded), Gestational Diabetes (1hr (22) 154(H) / 3hr (22) FB(H) -- pt states no one told her she has gestational diabetes, opted to continue with class 1 education but would like to check with OB regarding official gestational diabetes diagnosis prior to scheduling class 2 at this time ), and Virtual Regular Visit  Patient is at 33w4d gestation, Estimated Date of Delivery: 10/2/22  D&P Assessment Completed by Patient (Prior to Today's Visit) - Provided Via No Paper Just Vapor Attachment on 22  *Responses have been reported throughout the following note  Demographics Education/Occupation Support/Stress Management     Language: English    Ethnicity: White/    Country of Origin: [place]    Marital Status: Single   Highest grade completed: College Degree  Learns best by: (ALL)    Occupation: Yes, Part Time: 12-15hrs a week (5:30-10pm) as a  [employer]               Patient rates own health as: Good    Primary Support Person: Giovanni De La Garza (boyfriend), Dalia Martin (mom)    Cherise with stress by: Talking about it    How do you feel the diabetes diagnosis will affect the rest of your pregnancy? Answer: "I dont have it, havent been told I have it  Just my numbers are usually 10 above the wanted range "        Reviewed and updated the following from patients medical record: PMH, Problem List, Allergies, and Current Medications  Visit Diagnosis:  Encounter Diagnosis     ICD-10-CM    1   Gestational diabetes mellitus (GDM) in third trimester, gestational diabetes method of control unspecified  O24 419 Mychart glucose flowsheet    (22) 1hr GTT: 164(H)  / (22) 3hr GTT: 106 (H)  - pt reports no one told her she has gestational diabetes   2  33 weeks gestation of pregnancy  Z3A 33 Mychart glucose flowsheet   3  Abnormal GTT (glucose tolerance test)  R73 09 Ambulatory Referral to Maternal Fetal Medicine     Mychart glucose flowsheet    (22) 1hr GTT: 164(H)   (22) 3hr GTT: 106 (H)   4  Anemia affecting pregnancy in third trimester  O99 013     Hgb 10 0 on ; Taking oral iron; Colace prescribed   5  History of severe pre-eclampsia  Z87 59     Hx of  delivery at 33 weeks for PreE w/ SF; Taking low dose aspirin daily; Denies any symptoms at this time; Does not have a BP cuff        Discussed with patient:  Pathophysiology of Gestational diabetes mellitus (GDM) in third trimester, gestational diabetes method of control unspecified [O24 419]  Untreated hyperglycemia in pregnancy and maternal fetal complications (fetal macrosomia,  hypoglycemia, polyhydramnios, increased incidence of  section,  labor, and in severe cases fetal demise and still birth)  Importance of blood glucose monitoring, nutrition, and medication if necessary in achieving BG goals       HPI    Diabetes Hx:   Personal History? no   Family History: None     Pregnancy Overview:   OB History    Para Term  AB Living   3 1   1 1 1   SAB IAB Ectopic Multiple Live Births   1     0 1      # Outcome Date GA Lbr Grupo/2nd Weight Sex Delivery Anes PTL Lv   3 Current            2  21 33w1d / 00:09  M Vag-Spont EPI  SUMIT   1 2013     SAB         Birth Comments: early first trimester SAB       Pregnancy Plan:   Pregnancy: [place]  Fetal sex: Female  Support person: Mir Best (boyfriend), Eder Griggs (mom)     Delivery Plans  Deliver by GA (weeks): 40  Planned delivery method: Vaginal  Planned delivery location: [facility]  Planned anesthesia: None  Acceptable blood products:  All     Post-Delivery Plans  Feeding intentions: Breast Milk  Planned birth control: Condom Male     Labs  Lab Results   Component Value Date/Time    [id number] 164 (H) 07/14/2022 12:46 PM    GLUF 106 (H) 08/06/2022 07:24 AM     Labs Ordered This Visit: None    Current Medications  Current Outpatient Medications   Medication Instructions    aspirin (ECOTRIN LOW STRENGTH) 81 mg, Oral, Daily    docusate sodium (COLACE) 100 mg, Oral, 2 times daily    ferrous sulfate 324 (65 Fe) mg TAKE 1 TABLET BY MOUTH 2 TIMES A DAY BEFORE MEALS    Prenatal MV-Min-Fe Fum-FA-DHA (PRENATAL 1 PO) Oral    valACYclovir (VALTREX) 1,000 mg, Oral, 2 times daily    valACYclovir (VALTREX) 500 mg, Oral, 2 times daily, Start suppression therapy 500mg BID after completion of initial treatment      Preferred Pharmacy:   [facility]/pharmacy #[id number] Benito [address] [place] [address]  Phone: [phone] Fax: [address] [facility],  [facility] [address] ,  [place] [address]  Phone: [phone] Fax: [phone]     Anthropometrics:  Ht Readings from Last 1 Encounters:   08/17/22 5' (1 524 m)      Wt Readings from Last 3 Encounters:   08/17/22 73 5 kg (162 lb)   08/15/22 70 8 kg (156 lb)   08/10/22 71 7 kg (158 lb)        Pre-Gravid Wt Pre-Gravid BMI TWG   59 4 kg (131 lb) 25 58 14 1 kg (31 lb)     Total Pregnancy Weight Gain Recommendations: BMI (25-29 9) 15-25 lbs   Current Wt Status Compared to Recommendations: Exceeding -- Recommended to maintain wt for remainder of pregnancy    Most Recent Ultrasound Results:     Date:  GA: Fetal Growth GENO   7/12/22 28wk2d Normal Normal     Next US date: 8/29/22     Blood Glucose Monitoring:     Glucose Meter: OneTouch Verio Flex   *Has Contour Next from prev preg testing to rule out GDM - pt informed to begin testing with new meter   *orders for supplies (meter, lancets, strips) based on patients needs pended/routed to appropriate provider after today's visit for review/approval     Instructed on Testing Blood Sugars: 4 x per day (Fasting, 2 hour after start of each meal)  Goals: (Fasting) 60-90mg/dl // (1hr PP) <140mg/dl // (2hr PP) <120mg/dl  Meter Teaching: Gave instruction on site selection, skin preparation, loading strips and lancet device, meter activation, obtaining blood sample, test strip and lancet disposal and storage, and recording log book entries  Blood Sugar Tested in Office? No (Virtual Visit)  Instructed to report blood sugar results weekly via Phone: [phone] OR My Chart (Message with image attachment, or Glucose Flowsheet)    Meal Plan: Patient was provided with a meal plan including 3 meals and 3 snacks  *Calories: 1800 calorie (CHO: 41-71-00-69-99-25) (PRO:2-1-3-1-3-2)    Review of Patient's Current Diet:  Type of Diet: Regular  Special or ethnic dietary preferences? no  Food Allergies or Intolerances? no  Food Dislikes: None  Receiving [facility] or food stamps? yes Food [place]   Frequency of dining out/getting take out? biweekly    Meal Plan Tips Reviewed at Today's Visit:  [x] Appropriate amounts of CHO, PRO, and Fat at each meal and snack  [x] CHO exchange list, and portion sizes for both CHO and PRO via food models   [x] How to read a food label   [x] Suggested meal/snack options to increase nutrition and maintain consistent meal and snack intakes   [x] Eat every 2 0-3 5 hours while awake   [x] Go no longer than 8-10 hours fasting overnight until first meal of the day  Physical Activity:  Currently physically active? Pt feels she is very active caring for her child though does not follow any structured exercise regimen at this time     Reviewed w/ Pt:   Benefits of physical activity to optimize blood glucose control, encouraged activity at patient is physically able     Instructed pt to always consult a physician prior to starting an exercise program    Recommend 20-30 minutes daily  Patient Stated Goal: "I will eat 3 meals and 3 snacks each day, including protein at each"      Diabetes Self Management Support Plan (outside of ongoing care): Spouse/Family     Barriers to Learning/Change: No Barriers  Expected Compliance: good    Date to Report Blood Sugars: Day Before Class 2, Then Weekly (till delivery)    Class 2: Not scheduled at this time; Pt will call to schedule class 2 -- pt would like to confirm GDM dx with OB prior to scheduling since she states no one told her she has GDM  *Patient instructed to bring 3 day food diary to class 2     Begin Time: 8:14am    End Time: 9:18am    It was a pleasure working with them today  Please feel free to call with any questions or concerns      Barrera Thornton RD  Diabetes Educator  [facility]  Diabetes in Pregnancy Program  [address] [place] [address] 54, [address] [facility]  Virtual Regular Visit    Verification of patient location:    Patient is located in the following state in which I hold an active license [place]      Assessment/Plan:    Problem List Items Addressed This Visit        Other    33 weeks gestation of pregnancy    Relevant Orders    Mychart glucose flowsheet    Abnormal GTT (glucose tolerance test)    Relevant Orders    Mychart glucose flowsheet    Anemia affecting pregnancy in third trimester    History of severe pre-eclampsia      Other Visit Diagnoses     Gestational diabetes mellitus (GDM) in third trimester, gestational diabetes method of control unspecified    -  Primary    (7/14/22) 1hr GTT: 164(H)  / (8/6/22) 3hr GTT: 106 (H)  - pt reports no one told her she has gestational diabetes    Relevant Orders    Mychart glucose flowsheet               Reason for visit is   Chief Complaint   Patient presents with    Patient Education     Class 1; Not scheduled for Class 2 at this time    Virtual Regular Visit     Epic Embedded    Gestational Diabetes     1hr (22) 154(H) / 3hr (22) FB(H) -- pt states no one told her she has gestational diabetes, opted to continue with class 1 education but would like to check with OB regarding official gestational diabetes diagnosis prior to scheduling class 2 at this time   Virtual Regular Visit        Encounter provider Wade Sanchez RD    Provider located at [address] [place] [address] Dr [facility] [address] [phone]      Recent Visits  No visits were found meeting these conditions  Showing recent visits within past 7 days and meeting all other requirements  Today's Visits  Date Type Provider Dept   22 Telemedicine Wade Sanchez, [facility] today's visits and meeting all other requirements  Future Appointments  No visits were found meeting these conditions  Showing future appointments within next 150 days and meeting all other requirements       The patient was identified by name and date of birth  Yaquelin Maxwell was informed that this is a telemedicine visit and that the visit is being conducted through [address] [place] [address] Now and patient was informed that this is a secure, HIPAA-compliant platform  She agrees to proceed     My office door was closed  No one else was in the room  She acknowledged consent and understanding of privacy and security of the video platform  The patient has agreed to participate and understands they can discontinue the visit at any time  Patient is aware this is a billable service  Subjective  Yaquelin Maxwell is a 28 y o  female pregnant        HPI     Past Medical History:   Diagnosis Date    Abnormal Pap smear of cervix     Environmental allergies     Fetal renal anomaly, single gestation 2022    Genital herpes     Seasonal allergies     Severe pre-eclampsia in third trimester 2021     (spontaneous vaginal delivery) 2021    Varicella        Past Surgical History:   Procedure Laterality Date  NO PAST SURGERIES         Current Outpatient Medications   Medication Sig Dispense Refill    aspirin (ECOTRIN LOW STRENGTH) 81 mg EC tablet Take 81 mg by mouth daily      ferrous sulfate 324 (65 Fe) mg TAKE 1 TABLET BY MOUTH 2 TIMES A DAY BEFORE MEALS 60 tablet 0    Prenatal MV-Min-Fe Fum-FA-DHA (PRENATAL 1 PO) Take by mouth      valACYclovir (VALTREX) 1,000 mg tablet Take 1 tablet (1,000 mg total) by mouth 2 (two) times a day for 7 days 14 tablet 0    valACYclovir (VALTREX) 500 mg tablet Take 1 tablet (500 mg total) by mouth 2 (two) times a day Start suppression therapy 500mg BID after completion of initial treatment 84 tablet 0    docusate sodium (COLACE) 100 mg capsule Take 1 capsule (100 mg total) by mouth 2 (two) times a day (Patient not taking: No sig reported) 60 capsule 0     No current facility-administered medications for this visit  Allergies   Allergen Reactions    Penicillins Rash       Review of Systems   Unable to perform ROS: Other       Video Exam    There were no vitals filed for this visit  Physical Exam  Constitutional:       Appearance: Normal appearance  Comments: Limited Assessment r/t Virtual Visit   Neurological:      Mental Status: She is alert            I spent 64 minutes directly with the patient during this visit

## 2022-08-22 ENCOUNTER — ROUTINE PRENATAL (OUTPATIENT)
Dept: OBGYN CLINIC | Facility: CLINIC | Age: 32
End: 2022-08-22

## 2022-08-22 VITALS
SYSTOLIC BLOOD PRESSURE: 139 MMHG | BODY MASS INDEX: 31.69 KG/M2 | WEIGHT: 161.4 LBS | DIASTOLIC BLOOD PRESSURE: 94 MMHG | HEART RATE: 79 BPM | HEIGHT: 60 IN

## 2022-08-22 DIAGNOSIS — B00.9 HSV (HERPES SIMPLEX VIRUS) INFECTION: ICD-10-CM

## 2022-08-22 DIAGNOSIS — Z3A.34 34 WEEKS GESTATION OF PREGNANCY: ICD-10-CM

## 2022-08-22 DIAGNOSIS — O14.93 PRE-ECLAMPSIA IN THIRD TRIMESTER: ICD-10-CM

## 2022-08-22 DIAGNOSIS — Z34.93 PRENATAL CARE IN THIRD TRIMESTER: Primary | ICD-10-CM

## 2022-08-22 PROCEDURE — 99213 OFFICE O/P EST LOW 20 MIN: CPT | Performed by: OBSTETRICS & GYNECOLOGY

## 2022-08-22 NOTE — PROGRESS NOTES
Leigh Bhatia presents today for routine OB visit at 34w1d  Blood Pressure: 139/94  Wt=73 2 kg (161 lb 6 4 oz); Body mass index is 31 52 kg/m² ; TWG=13 8 kg (30 lb 6 4 oz)   ; Abdomen: gravid, soft, non-tender  She reports no complaints  Denies uterine contractions  Denies vaginal bleeding or leaking of fluid  Reports adequate fetal movement of at least 10 movements in 2 hours once daily  Scheduled for ultrasound 08/29/22  Glucose testing reassuring so far  NST reactive  Reviewed premature labor precautions and fetal kick counts  Advised to continue medications and return in 3 days        Current Outpatient Medications   Medication Instructions    aspirin (ECOTRIN LOW STRENGTH) 81 mg, Oral, Daily    Blood Glucose Monitoring Suppl (OneTouch Verio Flex System) w/Device KIT Test x4 Daily or as instructed    docusate sodium (COLACE) 100 mg, Oral, 2 times daily    ferrous sulfate 324 (65 Fe) mg TAKE 1 TABLET BY MOUTH 2 TIMES A DAY BEFORE MEALS    OneTouch Delica Lancets 04R MISC Use 4 a Day or as instructed    OneTouch Verio test strip Test 4 Times Daily or as instructed    Prenatal MV-Min-Fe Fum-FA-DHA (PRENATAL 1 PO) Oral    valACYclovir (VALTREX) 1,000 mg, Oral, 2 times daily    valACYclovir (VALTREX) 500 mg, Oral, 2 times daily, Start suppression therapy 500mg BID after completion of initial treatment         Pregnancy Problems (from 03/21/22 to present)     Problem Noted Resolved    Anemia affecting pregnancy in third trimester 7/28/2022 by Yuly Deluca MD No    Overview Addendum 7/28/2022  9:27 AM by Yuly Deluca MD     Hgb 10 0 on 7/11  Taking oral iron, denies symptoms of anemia  Colace prescribed         Previous Version    Fetal renal anomaly, single gestation 5/31/2022 by Donnie Pacheco MD No    Overview Signed 5/31/2022  1:40 PM by Donnie Pacheco MD     Bilateral UTD         33 weeks gestation of pregnancy 4/21/2022 by Max Meyer MD No    Overview Addendum 8/10/2022 10:23 AM by Tessie Spear MD     TWG: + 11 3kg (recommended weight gain 25-35lbs)   TDAP vaccine declined  Contraception: condoms  Breastfeeding: breast milk  Birth plan: open to epidural, desires to await spontaneous labor  3hr Glucola elevated >>> MFM  Delivery consent signed 7/28         Previous Version    GBS (group B Streptococcus carrier), +RV culture, currently pregnant 3/23/2022 by Oneal King MD No    Overview Addendum 6/16/2022  8:52 AM by Ashley Chan MD     Positive in Previous Pregnancy   Swab at 36 weeks with sensitivity secondary to PCN allergy          Previous Version

## 2022-08-25 ENCOUNTER — ROUTINE PRENATAL (OUTPATIENT)
Dept: OBGYN CLINIC | Facility: CLINIC | Age: 32
End: 2022-08-25

## 2022-08-25 VITALS
DIASTOLIC BLOOD PRESSURE: 94 MMHG | BODY MASS INDEX: 31.8 KG/M2 | HEIGHT: 60 IN | HEART RATE: 79 BPM | SYSTOLIC BLOOD PRESSURE: 144 MMHG | WEIGHT: 162 LBS

## 2022-08-25 DIAGNOSIS — O99.820 GBS (GROUP B STREPTOCOCCUS CARRIER), +RV CULTURE, CURRENTLY PREGNANT: ICD-10-CM

## 2022-08-25 DIAGNOSIS — Z3A.34 34 WEEKS GESTATION OF PREGNANCY: ICD-10-CM

## 2022-08-25 DIAGNOSIS — B00.9 HSV (HERPES SIMPLEX VIRUS) INFECTION: ICD-10-CM

## 2022-08-25 DIAGNOSIS — Z34.93 PRENATAL CARE IN THIRD TRIMESTER: Primary | ICD-10-CM

## 2022-08-25 DIAGNOSIS — O35.EXX0 FETAL RENAL ANOMALY, SINGLE GESTATION: ICD-10-CM

## 2022-08-25 PROCEDURE — 59025 FETAL NON-STRESS TEST: CPT | Performed by: OBSTETRICS & GYNECOLOGY

## 2022-08-25 PROCEDURE — 99214 OFFICE O/P EST MOD 30 MIN: CPT | Performed by: OBSTETRICS & GYNECOLOGY

## 2022-08-25 NOTE — PROGRESS NOTES
Ayden Martin presents today for routine OB visit at 34w4d  Blood Pressure: 144/94  Wt=73 5 kg (162 lb); Body mass index is 31 64 kg/m² ; TWG=13 8 kg (30 lb 6 4 oz)   ; Abdomen: gravid, soft, non-tender  She reports nothing  Denies uterine contractions  Denies vaginal bleeding or leaking of fluid  Reports adequate fetal movement of at least 10 movements in 2 hours once daily  Scheduled for ultrasound  8/29/22  Reviewed premature labor precautions and fetal kick counts  Advised to continue medications and return in 1 weeks        Current Outpatient Medications   Medication Instructions    aspirin (ECOTRIN LOW STRENGTH) 81 mg, Oral, Daily    Blood Glucose Monitoring Suppl (OneTouch Verio Flex System) w/Device KIT Test x4 Daily or as instructed    docusate sodium (COLACE) 100 mg, Oral, 2 times daily    ferrous sulfate 324 (65 Fe) mg TAKE 1 TABLET BY MOUTH 2 TIMES A DAY BEFORE MEALS    OneTouch Delica Lancets 36F MISC Use 4 a Day or as instructed    OneTouch Verio test strip Test 4 Times Daily or as instructed    Prenatal MV-Min-Fe Fum-FA-DHA (PRENATAL 1 PO) Oral    valACYclovir (VALTREX) 1,000 mg, Oral, 2 times daily    valACYclovir (VALTREX) 500 mg, Oral, 2 times daily, Start suppression therapy 500mg BID after completion of initial treatment         Pregnancy Problems (from 03/21/22 to present)     Problem Noted Resolved    Anemia affecting pregnancy in third trimester 7/28/2022 by Rebeka Sandoval MD No    Overview Addendum 7/28/2022  9:27 AM by Rebeka Sandoval MD     Hgb 10 0 on 7/11  Taking oral iron, denies symptoms of anemia  Colace prescribed         Previous Version    Fetal renal anomaly, single gestation 5/31/2022 by Smitha Castellanos MD No    Overview Signed 5/31/2022  1:40 PM by Smitha Castellanos MD     Bilateral UTD         34 weeks gestation of pregnancy 4/21/2022 by Gaetano Magdaleno MD No    Overview Addendum 8/10/2022 10:23 AM by Chikis Hernández MD     TWG: + 11 3kg (recommended weight gain 25-35lbs)   TDAP vaccine declined  Contraception: condoms  Breastfeeding: breast milk  Birth plan: open to epidural, desires to await spontaneous labor  3hr Glucola elevated >>> MFM  Delivery consent signed 7/28         Previous Version    GBS (group B Streptococcus carrier), +RV culture, currently pregnant 3/23/2022 by Bakari Grant MD No    Overview Addendum 6/16/2022  8:52 AM by Sienna Cook MD     Positive in Previous Pregnancy   Swab at 36 weeks with sensitivity secondary to PCN allergy          Previous Version

## 2022-08-25 NOTE — PATIENT INSTRUCTIONS
WARNING SIGNS DURING PREGNANCY  Call our office at [phone] for any of the followin  Vaginal bleeding  2  Sharp abdominal pain that does not go away  3  Fever (more than 100 4 and is not relieved by Tylenol)  4  Persistent vomiting lasting greater than 24 hours  5  Chest pain   6  Pain or burning when you urinate  7  Severe headache that doesn't resolve with Tylenol  8  Blurred vision or seeing spots in your vision  9  Sudden swelling of your face or hands  10  Redness, swelling or pain in a leg  11  A sudden weight gain in just a few days  12  Decrease in your baby's movement (after 28 weeks or the 6th month of pregnancy)  13  A loss of watery fluid from your vagina - can be a gush, a trickle or continuous wetness  14   After 20 weeks of pregnancy, rhythmic cramping (greater than 4 per hour) or menstrual like low/pelvic pain

## 2022-08-26 ENCOUNTER — TELEPHONE (OUTPATIENT)
Dept: PERINATAL CARE | Facility: CLINIC | Age: 32
End: 2022-08-26

## 2022-08-26 ENCOUNTER — DOCUMENTATION (OUTPATIENT)
Dept: PERINATAL CARE | Facility: CLINIC | Age: 32
End: 2022-08-26

## 2022-08-26 NOTE — PROGRESS NOTES
Date: 08/26/22  [place]  1990  Estimated Date of Delivery: 10/2/22  34w5d  OB/GYN: Dr Romy Nevarez  GDM in pregnancy method of control unspecified  Additional Pregnancy Complications: history of severe preeclampsia      Plan: Noted variability of FBG trends ranging  mg/dl  All 2 hr pp measurements are within target range  Advised patient to maintain at lease 8 hrs and no more than 10 hrs fasting overnight  Continue diet with bedtime snack of at least 1 CHO serving (15 gms) and 2-3 ounces of protein)  Try to maintain meal plan eating a combination of CHO paired with protein  Eating every 2-3 5 hrs while awake  Blood sugars were discussed at recent OB appointment  Onefeatt message sent to [facility] requested patient be scheduled for class 2  Diet: : 1800 calorie (CHO: 39-11-53-30-76-42) (PRO:2-1-3-1-3-2)  Gestational diabetes meal plan; 3 meals and 3 snacks  Testing blood sugars: 4 x per day (Fasting, 2 hour after start of each meal)  Meter: OneTouch Verio Flex (provided at telemedicine appointment  Activity: Walks 30 minutes daily, follow OB recommendations  Support System: Significant Other / family   Patient Goal: "I will eat 3 meals and 3 snacks each day, including protein at each"  Education:  Class 1 completed with Maninder Obrien on 8/18/22  Class 2 needs to be scheduled       Weight Change:    Pre-gravid weight: 59 4 kg (131 lb)  13 8 kg (30 lb 6 4 oz)  Weight gain recommendations: BMI (25-29 9) 15-25 lbs    Ultrasounds:  7/12/22 US:  Normal growth and GENO   EFW: 18 % AC: 27 % HC: 30 %  Next US scheduled for 8/29/22    Labs  Lab Results   Component Value Date    [id number] 164 (H) 07/14/2022     Lab Results   Component Value Date    GLUF 106 (H) 08/06/2022     No results found for: HGBA1C      Further fetal surveillance  Beginning at 32 weeks, NST / GENO twice a week, if indicated    Roel White, RD,LDN,CDE  Diabetes Educator

## 2022-08-26 NOTE — TELEPHONE ENCOUNTER
----- Message from Sharlene Sesay sent at 8/26/2022 12:23 PM EDT -----  Deejay Pace,  I asked Alen Rodriguez to contact you to schedule a class 2 appointment  If she has not reached out please call her to schedule  Thanks!   Suzanne Arredondo

## 2022-08-29 ENCOUNTER — ROUTINE PRENATAL (OUTPATIENT)
Dept: OBGYN CLINIC | Facility: CLINIC | Age: 32
End: 2022-08-29

## 2022-08-29 ENCOUNTER — ULTRASOUND (OUTPATIENT)
Dept: PERINATAL CARE | Facility: OTHER | Age: 32
End: 2022-08-29
Payer: COMMERCIAL

## 2022-08-29 VITALS
WEIGHT: 162 LBS | SYSTOLIC BLOOD PRESSURE: 153 MMHG | BODY MASS INDEX: 31.64 KG/M2 | HEART RATE: 74 BPM | DIASTOLIC BLOOD PRESSURE: 99 MMHG

## 2022-08-29 VITALS
HEART RATE: 89 BPM | HEIGHT: 60 IN | BODY MASS INDEX: 31.92 KG/M2 | SYSTOLIC BLOOD PRESSURE: 132 MMHG | WEIGHT: 162.6 LBS | DIASTOLIC BLOOD PRESSURE: 80 MMHG

## 2022-08-29 DIAGNOSIS — O14.93 PRE-ECLAMPSIA IN THIRD TRIMESTER: ICD-10-CM

## 2022-08-29 DIAGNOSIS — Z34.93 PRENATAL CARE IN THIRD TRIMESTER: Primary | ICD-10-CM

## 2022-08-29 DIAGNOSIS — O35.EXX0 FETAL RENAL ANOMALY, SINGLE GESTATION: Primary | ICD-10-CM

## 2022-08-29 DIAGNOSIS — Z3A.35 35 WEEKS GESTATION OF PREGNANCY: ICD-10-CM

## 2022-08-29 PROBLEM — O14.90 PREECLAMPSIA: Status: ACTIVE | Noted: 2022-08-29

## 2022-08-29 PROCEDURE — 99213 OFFICE O/P EST LOW 20 MIN: CPT | Performed by: NURSE PRACTITIONER

## 2022-08-29 PROCEDURE — 76816 OB US FOLLOW-UP PER FETUS: CPT | Performed by: OBSTETRICS & GYNECOLOGY

## 2022-08-29 PROCEDURE — 99213 OFFICE O/P EST LOW 20 MIN: CPT | Performed by: OBSTETRICS & GYNECOLOGY

## 2022-08-29 PROCEDURE — 59025 FETAL NON-STRESS TEST: CPT | Performed by: NURSE PRACTITIONER

## 2022-08-29 NOTE — PATIENT INSTRUCTIONS
Thank you for your confidence in our team    We appreciate you and welcome your feedback  If you receive a survey from us, please take a few moments to let us know how we are doing     Sincerely,  JIN Turner

## 2022-08-29 NOTE — PROGRESS NOTES
Jamal Collet presents today for routine OB visit at 35w1d  Blood Pressure: 153/99  Wt=73 5 kg (162 lb); Body mass index is 31 64 kg/m² ; TWG=14 1 kg (31 lb)  Fetal Heart Rate: 140; Fundal Height (cm): 35 cm  Abdomen: gravid, soft, non-tender  She reports no complaints  Denies h/a, visual disturbance, n/v   Denies uterine contractions  Denies vaginal bleeding or leaking of fluid  Reports adequate fetal movement of at least 10 movements in 2 hours once daily  NST today is reactive  Reviewed premature labor precautions and fetal kick counts  Advised to continue medications and return in 3 days for next NST        Current Outpatient Medications   Medication Instructions    aspirin (ECOTRIN LOW STRENGTH) 81 mg, Oral, Daily    ferrous sulfate 324 (65 Fe) mg TAKE 1 TABLET BY MOUTH 2 TIMES A DAY BEFORE MEALS    Prenatal MV-Min-Fe Fum-FA-DHA (PRENATAL 1 PO) Oral    valACYclovir (VALTREX) 1,000 mg, Oral, 2 times daily

## 2022-08-29 NOTE — PROGRESS NOTES
The patient was seen today for an ultrasound  Please see ultrasound report (located under Ob Procedures) for additional details  Thank you very much for allowing us to participate in the care of this very nice patient  Should you have any questions, please do not hesitate to contact me  MD Lazaro Broussarducah  Attending Physician, Allison

## 2022-08-30 ENCOUNTER — TELEPHONE (OUTPATIENT)
Dept: GASTROENTEROLOGY | Facility: CLINIC | Age: 32
End: 2022-08-30

## 2022-08-30 NOTE — TELEPHONE ENCOUNTER
Trang Magdaleno returning Elvia's call  Trang Magdaleno states it was to make an appt for nephrology for Abnormal GTT       Call back #: [phone]

## 2022-09-01 ENCOUNTER — APPOINTMENT (OUTPATIENT)
Dept: LAB | Facility: HOSPITAL | Age: 32
End: 2022-09-01
Payer: COMMERCIAL

## 2022-09-01 ENCOUNTER — TELEMEDICINE (OUTPATIENT)
Dept: NEPHROLOGY | Facility: CLINIC | Age: 32
End: 2022-09-01
Payer: COMMERCIAL

## 2022-09-01 ENCOUNTER — ROUTINE PRENATAL (OUTPATIENT)
Dept: OBGYN CLINIC | Facility: CLINIC | Age: 32
End: 2022-09-01

## 2022-09-01 VITALS
HEART RATE: 65 BPM | SYSTOLIC BLOOD PRESSURE: 158 MMHG | WEIGHT: 163 LBS | DIASTOLIC BLOOD PRESSURE: 97 MMHG | BODY MASS INDEX: 31.83 KG/M2

## 2022-09-01 DIAGNOSIS — U07.1 COVID-19 AFFECTING PREGNANCY IN SECOND TRIMESTER: ICD-10-CM

## 2022-09-01 DIAGNOSIS — O99.820 GBS (GROUP B STREPTOCOCCUS CARRIER), +RV CULTURE, CURRENTLY PREGNANT: ICD-10-CM

## 2022-09-01 DIAGNOSIS — Z87.59 HISTORY OF SEVERE PRE-ECLAMPSIA: ICD-10-CM

## 2022-09-01 DIAGNOSIS — B00.9 HSV (HERPES SIMPLEX VIRUS) INFECTION: ICD-10-CM

## 2022-09-01 DIAGNOSIS — O24.410 GDM (GESTATIONAL DIABETES MELLITUS), CLASS A1: ICD-10-CM

## 2022-09-01 DIAGNOSIS — O35.EXX0 FETAL RENAL ANOMALY, SINGLE GESTATION: ICD-10-CM

## 2022-09-01 DIAGNOSIS — O99.013 ANEMIA AFFECTING PREGNANCY IN THIRD TRIMESTER: ICD-10-CM

## 2022-09-01 DIAGNOSIS — Z3A.35 35 WEEKS GESTATION OF PREGNANCY: ICD-10-CM

## 2022-09-01 DIAGNOSIS — R87.810 ATYPICAL SQUAMOUS CELL CHANGES OF UNDETERMINED SIGNIFICANCE (ASCUS) ON CERVICAL CYTOLOGY WITH POSITIVE HIGH RISK HUMAN PAPILLOMA VIRUS (HPV): ICD-10-CM

## 2022-09-01 DIAGNOSIS — O14.93 PRE-ECLAMPSIA IN THIRD TRIMESTER: ICD-10-CM

## 2022-09-01 DIAGNOSIS — B37.31 YEAST VAGINITIS: ICD-10-CM

## 2022-09-01 DIAGNOSIS — R87.610 ATYPICAL SQUAMOUS CELL CHANGES OF UNDETERMINED SIGNIFICANCE (ASCUS) ON CERVICAL CYTOLOGY WITH POSITIVE HIGH RISK HUMAN PAPILLOMA VIRUS (HPV): ICD-10-CM

## 2022-09-01 DIAGNOSIS — O98.512 COVID-19 AFFECTING PREGNANCY IN SECOND TRIMESTER: ICD-10-CM

## 2022-09-01 DIAGNOSIS — Z34.93 PRENATAL CARE IN THIRD TRIMESTER: Primary | ICD-10-CM

## 2022-09-01 LAB
ALBUMIN SERPL BCP-MCNC: 2.4 G/DL (ref 3.5–5)
ALP SERPL-CCNC: 341 U/L (ref 46–116)
ALT SERPL W P-5'-P-CCNC: 17 U/L (ref 12–78)
ANION GAP SERPL CALCULATED.3IONS-SCNC: 12 MMOL/L (ref 4–13)
AST SERPL W P-5'-P-CCNC: 17 U/L (ref 5–45)
BILIRUB SERPL-MCNC: 0.15 MG/DL (ref 0.2–1)
BUN SERPL-MCNC: 12 MG/DL (ref 5–25)
CALCIUM ALBUM COR SERPL-MCNC: 10.6 MG/DL (ref 8.3–10.1)
CALCIUM SERPL-MCNC: 9.3 MG/DL (ref 8.3–10.1)
CHLORIDE SERPL-SCNC: 104 MMOL/L (ref 96–108)
CO2 SERPL-SCNC: 22 MMOL/L (ref 21–32)
CREAT SERPL-MCNC: 0.6 MG/DL (ref 0.6–1.3)
ERYTHROCYTE [DISTWIDTH] IN BLOOD BY AUTOMATED COUNT: 16 % (ref 11.6–15.1)
GFR SERPL CREATININE-BSD FRML MDRD: 122 ML/MIN/1.73SQ M
GLUCOSE SERPL-MCNC: 98 MG/DL (ref 65–140)
HCT VFR BLD AUTO: 33.4 % (ref 34.8–46.1)
HGB BLD-MCNC: 10.5 G/DL (ref 11.5–15.4)
MCH RBC QN AUTO: 24.8 PG (ref 26.8–34.3)
MCHC RBC AUTO-ENTMCNC: 31.4 G/DL (ref 31.4–37.4)
MCV RBC AUTO: 79 FL (ref 82–98)
PLATELET # BLD AUTO: 283 THOUSANDS/UL (ref 149–390)
PMV BLD AUTO: 12.9 FL (ref 8.9–12.7)
POTASSIUM SERPL-SCNC: 3.7 MMOL/L (ref 3.5–5.3)
PROT SERPL-MCNC: 7.6 G/DL (ref 6.4–8.4)
RBC # BLD AUTO: 4.24 MILLION/UL (ref 3.81–5.12)
SODIUM SERPL-SCNC: 138 MMOL/L (ref 135–147)
WBC # BLD AUTO: 9.44 THOUSAND/UL (ref 4.31–10.16)

## 2022-09-01 PROCEDURE — 36415 COLL VENOUS BLD VENIPUNCTURE: CPT | Performed by: OBSTETRICS & GYNECOLOGY

## 2022-09-01 PROCEDURE — 99213 OFFICE O/P EST LOW 20 MIN: CPT | Performed by: OBSTETRICS & GYNECOLOGY

## 2022-09-01 PROCEDURE — 99244 OFF/OP CNSLTJ NEW/EST MOD 40: CPT | Performed by: PEDIATRICS

## 2022-09-01 PROCEDURE — 80053 COMPREHEN METABOLIC PANEL: CPT | Performed by: OBSTETRICS & GYNECOLOGY

## 2022-09-01 PROCEDURE — 85027 COMPLETE CBC AUTOMATED: CPT | Performed by: OBSTETRICS & GYNECOLOGY

## 2022-09-01 PROCEDURE — 87150 DNA/RNA AMPLIFIED PROBE: CPT | Performed by: OBSTETRICS & GYNECOLOGY

## 2022-09-01 PROCEDURE — 87184 SC STD DISK METHOD PER PLATE: CPT | Performed by: OBSTETRICS & GYNECOLOGY

## 2022-09-01 NOTE — PROGRESS NOTES
Virtual Regular Visit    Verification of patient location: [place]    Patient is located in the following state in which I hold an active license [place]      Assessment/Plan:    Problem List Items Addressed This Visit        Genitourinary    Fetal renal anomaly, single gestation               Reason for visit is   Chief Complaint   Patient presents with    Appointment     Prenatal visit, kidneys are enlarged in fetus    Virtual Regular Visit        Encounter provider Laurie Valladares MD    Provider located at [address] [place] [address]  [phone]      Recent Visits  Date Type Provider Dept   08/30/22 Telephone Laurie Valladares MD [facility] [place]   Showing recent visits within past 7 days and meeting all other requirements  Today's Visits  Date Type Provider Dept   09/01/22 Telemedicine Laurie Valladares MD [facility] [address] today's visits and meeting all other requirements  Future Appointments  No visits were found meeting these conditions  Showing future appointments within next 150 days and meeting all other requirements       The patient was identified by name and date of birth  Va Bauer was informed that this is a telemedicine visit and that the visit is being conducted through [address] and patient was informed this is a secure, HIPAA-complaint platform  She agrees to proceed     My office door was closed  No one else was in the room  She acknowledged consent and understanding of privacy and security of the video platform  The patient has agreed to participate and understands they can discontinue the visit at any time  Patient is aware this is a billable service  Subjective  Va Bauer is a 28 y o  female with a fetal renal anomaly here for evaluation  Thierno Claros is a 41-year-old female who presents today for consultation due to fetal renal anomaly    Sonja Gonzalez states that she was first aware of this at the time of her second ultrasound  She was noted on her anatomy scan to have bilateral urinary tract dilatation with the right kidney being greater than the left  This continued to progress with regards to degree of dilatation noted at her 28 week scan as well  Her last ultrasound was completed continued worsening of dilatation with suspected cortical thinning of the right kidney  Recommended Pediatric Nephrology evaluation to discuss next steps after delivery  Farideh Lucio states that she was also recently diagnosed with preeclampsia without severe features  She feels well other than having hand swelling which improved over the course of the day  She has had good fetal movement  Energy level is stable  No family history of renal issues on either side         Past Medical History:   Diagnosis Date    Abnormal Pap smear of cervix     Environmental allergies     Fetal renal anomaly, single gestation 2022    Genital herpes     Seasonal allergies     Severe pre-eclampsia in third trimester 2021     (spontaneous vaginal delivery) 2021    Varicella        Past Surgical History:   Procedure Laterality Date    NO PAST SURGERIES         Current Outpatient Medications   Medication Sig Dispense Refill    aspirin (ECOTRIN LOW STRENGTH) 81 mg EC tablet Take 81 mg by mouth daily      Blood Glucose Monitoring Suppl (OneTouch Verio Flex System) w/Device KIT Test x4 Daily or as instructed 1 kit 0    ferrous sulfate 324 (65 Fe) mg TAKE 1 TABLET BY MOUTH 2 TIMES A DAY BEFORE MEALS 60 tablet 0    OneTouch Delica Lancets 30V MISC Use 4 a Day or as instructed 100 each 2    OneTouch Verio test strip Test 4 Times Daily or as instructed 100 strip 2    Prenatal MV-Min-Fe Fum-FA-DHA (PRENATAL 1 PO) Take by mouth      valACYclovir (VALTREX) 500 mg tablet Take 1 tablet (500 mg total) by mouth 2 (two) times a day Start suppression therapy 500mg BID after completion of initial treatment 84 tablet 0     No current facility-administered medications for this visit  Allergies   Allergen Reactions    Penicillins Rash       Review of Systems   Constitutional: Negative  HENT: Negative  Respiratory: Negative  Cardiovascular:        Swelling in her hands that improves over the course of the day   Genitourinary: Negative  Neurological: Negative  All other systems reviewed and are negative  Video Exam    There were no vitals filed for this visit  Physical Exam  Constitutional:       Appearance: Normal appearance  HENT:      Head: Normocephalic and atraumatic  Pulmonary:      Effort: Pulmonary effort is normal    Neurological:      General: No focal deficit present  Mental Status: She is alert  I spent 45 minutes with patient today in which greater than 50% of the time was spent in counseling/coordination of care regarding Hydronephrosis  Discussed with Elsie Bowie today potential etiologies of hydronephrosis at length  We reviewed vesicoureteral reflux, obstruction as well as idiopathic hydronephrosis  During that conversation we also discussed potential evaluation as well as management of the individual categories  I recommend that infant has a  ultrasound performed after 48 hours of life to assess degree of dilatation  This will then determine next steps with regards to evaluation and management  Infant will need follow-up with our office after delivery as well  All questions answered during the visit

## 2022-09-01 NOTE — PROGRESS NOTES
[facility]  PRENATAL VISIT    Subjective:  28 y o  [id number] 35w4d who presents for routine prenatal visit  She has no obstetric complaints, including pelvic pain, contractions, vaginal bleeding, loss of fluid, or decreased fetal movement  Today we discussed the following issues in her pregnancy:    Preeclampsia:  BPs at home 120-130s/80-90s  Denies symptoms of HA, RUQ pain, vision changes, increased lower extremity swelling  Has not yet started weekly labs     Discussed need to schedule IOL at 37w, starting   Patient would like morning of   A1GDM:  Fastings 90s, 2 hr PP 110s  Anemia:  Continuing oral iron every other day  Symptoms: none     Partner with HSV, HSV IgG positive:  No new lesions, taking ppx  Will continue til delivery  GBS positive:  GBS bacteriuria positive on prenatal panel; sensitivities never performed   Discussed collecting GBS swab today as she will be induced at 37w for Preeclampsia wo SF  Patient received vancomycin in last delivery for GBS treatment due to  delivery and no GBS result prior to delivery     Yeast infection:  Diagnosed at last triage visit 8/15  Given treatment  Symptoms improved     Severe fetal hydronephrosis on  ultrasound: Had virtual appt today with Peds Neprho: recommend US within 48h of life  ASCUS pap in 2020:   Had colposcopy at the time, was WNL  Recommended to repeat pap in 1 year; never completed  Discussed will need Pap smear after delivery     Objective:  Vitals:    22 1541   BP: 158/97   Pulse: 65     TW 5 kg (32 lb)    Gen: no acute distress, nonlabored breathing  Cardio: RRR, S1/S2 normal   Lungs: CTAB, good effort   Abd: soft, nontender  Gravid uterus             NST  Baseline: 130  Variability: moderate   Accelerations: present, 15 x 15   Decelerations: none     [place]: flat     GENO  Q1: 3 71 cm   Q2: 2 96 cm  Q3: 3 09 cm  Q4: 2 60 cm  Total: 12 36 cm   Position: vertex     SVE: declined today     Assessment and Plan:  IUP @ 35w4d     Problem List        Endocrine    GDM (gestational diabetes mellitus), class A1    Overview     Following with  Diabetes Education   1800 calorie GDM diet, 3 meals and 3 snacks  Continue measuring fasting + 2hr postprandial sugars            Cardiovascular and Mediastinum    Preeclampsia    Overview     Presented to triage 22, noted to have elevated BP's and P/C ratio of 0 47  History of PreE w SF last pregnancy requiring delivery at 33w   Weekly labs ordered   IOL scheduled for  at 8 AM            Genitourinary    Fetal renal anomaly, single gestation    Overview     Bilateral UTD, Severe on Right  Peds Nephro consultation 22:  to have U/S within first 48h of life         Yeast vaginitis    Overview     Diagnosed 8/15/22  Treated with Diflucan x 1 dose in triage   Now resolved            Other    Atypical squamous cell changes of undetermined significance (ASCUS) on cervical cytology with positive high risk human papilloma virus (HPV)    Overview     2015: NILM  10/8/2020: ASCUS, HPV Other HR +   2020: Colpo, benign    Was to repeat Pap smear in 1 year from [address] [place] [address]  Had a pregnancy in   Lost to care end of    Presented to care again at start of current pregnancy     Needs Pap smear after delivery during postpartum follow up         HSV (herpes simplex virus) infection    Overview     Partner with history of HSV, patient with positive IGG on labs 2021 at [facility]  Denies new lesions  Continue Valtrex ppx          History of severe pre-eclampsia    Overview     Hx of  delivery at 33 weeks for PreE w/ SF   Now with PreE wo SF this pregnancy (see separate Problem)  Continue  mg            GBS (group B Streptococcus carrier), +RV culture, currently pregnant    Overview     Patient with PCN allergy   GBS bacteriuria on prenatal panel urine culture   Sensitivities not performed   GBS swab with sensitivities collected 22         35 weeks gestation of pregnancy    Overview     RTC Q1 week for routine prenatal care and antepartum fetal surveillance         COVID-19 affecting pregnancy in second trimester    Anemia affecting pregnancy in third trimester    Overview     Hgb trend:  Lab Results   Component Value Date/Time    HGB 10 5 (L) 2022 05:07 PM    HGB 9 6 (L) 2022 12:03 AM    HGB 10 0 (L) 2022 04:04 PM     Continuing PO iron          Prenatal care in third trimester    Overview     TW 5 kg (32 lb) ; (recommended weight gain 25-35lbs)  TDAP vaccine declined  Birth plan: open to epidural, desires to await spontaneous labor  Contraception: male condoms   Delivery consent signed                   Pregnancy: Luci Robbins  Fetal sex: Female  Support person: Ritika Vargas (boyfriend), Starr Quevedo (mom)     Delivery Plans  Deliver by GA (weeks): 40  Planned delivery method: Vaginal  Planned delivery location: [place] L&D  Planned anesthesia: None  Acceptable blood products:  All     Post-Delivery Plans  Feeding intentions: Breast Milk  Planned birth control: Condom Male    Corby Cedillo MD  PGY-4, OBGYN  2022

## 2022-09-04 LAB
GP B STREP DNA SPEC QL NAA+PROBE: ABNORMAL
GP B STREP DNA SPEC QL NAA+PROBE: POSITIVE

## 2022-09-06 ENCOUNTER — ROUTINE PRENATAL (OUTPATIENT)
Dept: OBGYN CLINIC | Facility: CLINIC | Age: 32
End: 2022-09-06

## 2022-09-06 VITALS
DIASTOLIC BLOOD PRESSURE: 104 MMHG | BODY MASS INDEX: 31.8 KG/M2 | HEIGHT: 60 IN | SYSTOLIC BLOOD PRESSURE: 155 MMHG | WEIGHT: 162 LBS | HEART RATE: 64 BPM

## 2022-09-06 DIAGNOSIS — Z34.93 PRENATAL CARE IN THIRD TRIMESTER: Primary | ICD-10-CM

## 2022-09-06 DIAGNOSIS — Z3A.36 36 WEEKS GESTATION OF PREGNANCY: ICD-10-CM

## 2022-09-06 DIAGNOSIS — O99.820 GBS (GROUP B STREPTOCOCCUS CARRIER), +RV CULTURE, CURRENTLY PREGNANT: ICD-10-CM

## 2022-09-06 LAB
GP B STREP DNA SPEC QL NAA+PROBE: ABNORMAL
GP B STREP DNA SPEC QL NAA+PROBE: ABNORMAL
GP B STREP DNA SPEC QL NAA+PROBE: POSITIVE

## 2022-09-06 PROCEDURE — 99213 OFFICE O/P EST LOW 20 MIN: CPT | Performed by: OBSTETRICS & GYNECOLOGY

## 2022-09-06 PROCEDURE — 59025 FETAL NON-STRESS TEST: CPT | Performed by: OBSTETRICS & GYNECOLOGY

## 2022-09-06 NOTE — PROGRESS NOTES
Isis Hathaway presents today for routine OB visit at 36w2d  Blood Pressure: (!) 155/104  Wt=73 5 kg (162 lb); Body mass index is 31 64 kg/m² ; TWG=14 1 kg (31 lb)   ; Abdomen: gravid, soft, non-tender  She reports no complaints  NST reactive  Denies uterine contractions  Denies vaginal bleeding or leaking of fluid  Reports adequate fetal movement of at least 10 movements in 2 hours once daily  Scheduled for induction next Monday     Reviewed premature labor precautions and fetal kick counts  Advised to continue medications and return 2 days  Current Outpatient Medications   Medication Instructions    aspirin (ECOTRIN LOW STRENGTH) 81 mg, Daily    Blood Glucose Monitoring Suppl (OneTouch Verio Flex System) w/Device KIT Test x4 Daily or as instructed    ferrous sulfate 324 (65 Fe) mg TAKE 1 TABLET BY MOUTH 2 TIMES A DAY BEFORE MEALS    OneTouch Delica Lancets 46S MISC Use 4 a Day or as instructed    OneTouch Verio test strip Test 4 Times Daily or as instructed    Prenatal MV-Min-Fe Fum-FA-DHA (PRENATAL 1 PO) Oral    valACYclovir (VALTREX) 500 mg, Oral, 2 times daily, Start suppression therapy 500mg BID after completion of initial treatment         Pregnancy Problems (from 22 to present)     Problem Noted Resolved    Prenatal care in third trimester 2022 by Vinnie Barnes MD No    Overview Addendum 2022 11:44 PM by Vinnie Barnes MD     TW 5 kg (32 lb) ; (recommended weight gain 25-35lbs)  TDAP vaccine declined  Birth plan: open to epidural, desires to await spontaneous labor  Contraception: male condoms   Delivery consent signed            Previous Version    Preeclampsia 2022 by JIN Fink No    Overview Addendum 2022 11:40 PM by Vinnie Barnes MD     Presented to triage 22, noted to have elevated BP's and P/C ratio of 0 47     History of PreE w SF last pregnancy requiring delivery at 33w   Weekly labs ordered   IOL scheduled for  at 8 AM         Previous Version    GDM (gestational diabetes mellitus), class A1 8/10/2022 by Vanessa Garrison MD No    Overview Signed 2022 11:52 AM by Zachary Angela MD     Following with  Diabetes Education   1800 calorie GDM diet, 3 meals and 3 snacks  Continue measuring fasting + 2hr postprandial sugars         Anemia affecting pregnancy in third trimester 2022 by Terri Summers MD No    Overview Addendum 2022 11:44 PM by Zachary Angela MD     Hgb trend:  Lab Results   Component Value Date/Time    HGB 10 5 (L) 2022 05:07 PM    HGB 9 6 (L) 2022 12:03 AM    HGB 10 0 (L) 2022 04:04 PM     Continuing PO iron          Previous Version    Fetal renal anomaly, single gestation 2022 by Irena Car MD No    Overview Addendum 2022 11:40 PM by Zachary Angela MD     Bilateral UTD, Severe on Right  Peds Nephro consultation 22:  to have U/S within first 48h of life         Previous Version    35 weeks gestation of pregnancy 2022 by Donald Varghese MD No    Overview Addendum 2022 12:09 PM by Zachary Angela MD     RTC Q1 week for routine prenatal care and antepartum fetal surveillance         Previous Version    GBS (group B Streptococcus carrier), +RV culture, currently pregnant 3/23/2022 by Linda Kennedy MD No    Overview Addendum 2022 12:09 PM by Zachary Angela MD     Patient with PCN allergy   GBS bacteriuria on prenatal panel urine culture   Sensitivities not performed   GBS swab with sensitivities collected 22         Previous Version

## 2022-09-07 ENCOUNTER — ANESTHESIA (INPATIENT)
Dept: ANESTHESIOLOGY | Facility: HOSPITAL | Age: 32
DRG: 560 | End: 2022-09-07
Payer: COMMERCIAL

## 2022-09-07 ENCOUNTER — NURSE TRIAGE (OUTPATIENT)
Dept: OTHER | Facility: OTHER | Age: 32
End: 2022-09-07

## 2022-09-07 ENCOUNTER — ANESTHESIA EVENT (INPATIENT)
Dept: ANESTHESIOLOGY | Facility: HOSPITAL | Age: 32
DRG: 560 | End: 2022-09-07
Payer: COMMERCIAL

## 2022-09-07 ENCOUNTER — HOSPITAL ENCOUNTER (INPATIENT)
Facility: HOSPITAL | Age: 32
LOS: 2 days | Discharge: HOME | DRG: 560 | End: 2022-09-09
Attending: OBSTETRICS & GYNECOLOGY | Admitting: OBSTETRICS & GYNECOLOGY
Payer: COMMERCIAL

## 2022-09-07 DIAGNOSIS — Z3A.36 36 WEEKS GESTATION OF PREGNANCY: ICD-10-CM

## 2022-09-07 PROBLEM — O60.03 PRETERM LABOR IN THIRD TRIMESTER: Status: ACTIVE | Noted: 2022-09-07

## 2022-09-07 LAB
ABO GROUP BLD: NORMAL
ALBUMIN SERPL BCP-MCNC: 2.3 G/DL (ref 3.5–5)
ALP SERPL-CCNC: 349 U/L (ref 46–116)
ALT SERPL W P-5'-P-CCNC: 20 U/L (ref 12–78)
ANION GAP SERPL CALCULATED.3IONS-SCNC: 12 MMOL/L (ref 4–13)
AST SERPL W P-5'-P-CCNC: 18 U/L (ref 5–45)
BASE EXCESS BLDCOA CALC-SCNC: -8.7 MMOL/L (ref 3–11)
BASE EXCESS BLDCOV CALC-SCNC: -5.3 MMOL/L (ref 1–9)
BILIRUB SERPL-MCNC: 0.15 MG/DL (ref 0.2–1)
BLD GP AB SCN SERPL QL: NEGATIVE
BUN SERPL-MCNC: 17 MG/DL (ref 5–25)
CALCIUM ALBUM COR SERPL-MCNC: 10 MG/DL (ref 8.3–10.1)
CALCIUM SERPL-MCNC: 8.6 MG/DL (ref 8.3–10.1)
CHLORIDE SERPL-SCNC: 106 MMOL/L (ref 96–108)
CO2 SERPL-SCNC: 20 MMOL/L (ref 21–32)
CREAT SERPL-MCNC: 0.68 MG/DL (ref 0.6–1.3)
CREAT UR-MCNC: 133.7 MG/DL
ERYTHROCYTE [DISTWIDTH] IN BLOOD BY AUTOMATED COUNT: 16.7 % (ref 11.6–15.1)
GFR SERPL CREATININE-BSD FRML MDRD: 117 ML/MIN/1.73SQ M
GLUCOSE SERPL-MCNC: 100 MG/DL (ref 65–140)
GLUCOSE SERPL-MCNC: 91 MG/DL (ref 65–140)
GLUCOSE SERPL-MCNC: 98 MG/DL (ref 65–140)
HCO3 BLDCOA-SCNC: 20 MMOL/L (ref 17.3–27.3)
HCO3 BLDCOV-SCNC: 19.9 MMOL/L (ref 12.2–28.6)
HCT VFR BLD AUTO: 34.2 % (ref 34.8–46.1)
HGB BLD-MCNC: 10.9 G/DL (ref 11.5–15.4)
MCH RBC QN AUTO: 24.7 PG (ref 26.8–34.3)
MCHC RBC AUTO-ENTMCNC: 31.9 G/DL (ref 31.4–37.4)
MCV RBC AUTO: 77 FL (ref 82–98)
O2 CT VFR BLDCOA CALC: 13 ML/DL
OXYHGB MFR BLDCOA: 52 %
OXYHGB MFR BLDCOV: 59.9 %
PCO2 BLDCOA: 53.1 MM[HG] (ref 30–60)
PCO2 BLDCOV: 38.5 MM HG (ref 27–43)
PH BLDCOA: 7.19 [PH] (ref 7.23–7.43)
PH BLDCOV: 7.33 [PH] (ref 7.19–7.49)
PLATELET # BLD AUTO: 246 THOUSANDS/UL (ref 149–390)
PMV BLD AUTO: 12.1 FL (ref 8.9–12.7)
PO2 BLDCOA: 25.1 MM HG (ref 5–25)
PO2 BLDCOV: 25 MM HG (ref 15–45)
POTASSIUM SERPL-SCNC: 4 MMOL/L (ref 3.5–5.3)
PROT SERPL-MCNC: 7.5 G/DL (ref 6.4–8.4)
PROT UR-MCNC: 255 MG/DL
PROT/CREAT UR: 1.91 MG/G{CREAT} (ref 0–0.1)
RBC # BLD AUTO: 4.42 MILLION/UL (ref 3.81–5.12)
RH BLD: POSITIVE
RPR SER QL: NORMAL
SAO2 % BLDCOV: 15.7 ML/DL
SODIUM SERPL-SCNC: 138 MMOL/L (ref 135–147)
SPECIMEN EXPIRATION DATE: NORMAL
WBC # BLD AUTO: 12.46 THOUSAND/UL (ref 4.31–10.16)

## 2022-09-07 PROCEDURE — 88307 TISSUE EXAM BY PATHOLOGIST: CPT | Performed by: PATHOLOGY

## 2022-09-07 PROCEDURE — 10907ZC DRAINAGE OF AMNIOTIC FLUID, THERAPEUTIC FROM PRODUCTS OF CONCEPTION, VIA NATURAL OR ARTIFICIAL OPENING: ICD-10-PCS | Performed by: STUDENT IN AN ORGANIZED HEALTH CARE EDUCATION/TRAINING PROGRAM

## 2022-09-07 PROCEDURE — 4A1HXCZ MONITORING OF PRODUCTS OF CONCEPTION, CARDIAC RATE, EXTERNAL APPROACH: ICD-10-PCS | Performed by: STUDENT IN AN ORGANIZED HEALTH CARE EDUCATION/TRAINING PROGRAM

## 2022-09-07 PROCEDURE — NC001 PR NO CHARGE: Performed by: OBSTETRICS & GYNECOLOGY

## 2022-09-07 PROCEDURE — 99212 OFFICE O/P EST SF 10 MIN: CPT

## 2022-09-07 PROCEDURE — 59409 OBSTETRICAL CARE: CPT | Performed by: STUDENT IN AN ORGANIZED HEALTH CARE EDUCATION/TRAINING PROGRAM

## 2022-09-07 PROCEDURE — 82805 BLOOD GASES W/O2 SATURATION: CPT | Performed by: OBSTETRICS & GYNECOLOGY

## 2022-09-07 PROCEDURE — 86592 SYPHILIS TEST NON-TREP QUAL: CPT

## 2022-09-07 PROCEDURE — 82948 REAGENT STRIP/BLOOD GLUCOSE: CPT

## 2022-09-07 PROCEDURE — 86901 BLOOD TYPING SEROLOGIC RH(D): CPT

## 2022-09-07 PROCEDURE — 86900 BLOOD TYPING SEROLOGIC ABO: CPT

## 2022-09-07 PROCEDURE — 80053 COMPREHEN METABOLIC PANEL: CPT

## 2022-09-07 PROCEDURE — 86850 RBC ANTIBODY SCREEN: CPT

## 2022-09-07 PROCEDURE — 0HQ9XZZ REPAIR PERINEUM SKIN, EXTERNAL APPROACH: ICD-10-PCS | Performed by: STUDENT IN AN ORGANIZED HEALTH CARE EDUCATION/TRAINING PROGRAM

## 2022-09-07 PROCEDURE — 85027 COMPLETE CBC AUTOMATED: CPT

## 2022-09-07 PROCEDURE — 82570 ASSAY OF URINE CREATININE: CPT

## 2022-09-07 PROCEDURE — 84156 ASSAY OF PROTEIN URINE: CPT

## 2022-09-07 RX ORDER — ROPIVACAINE HYDROCHLORIDE 2 MG/ML
INJECTION, SOLUTION EPIDURAL; INFILTRATION; PERINEURAL AS NEEDED
Status: DISCONTINUED | OUTPATIENT
Start: 2022-09-07 | End: 2022-09-07 | Stop reason: HOSPADM

## 2022-09-07 RX ORDER — IBUPROFEN 600 MG/1
600 TABLET ORAL EVERY 6 HOURS PRN
Status: DISCONTINUED | OUTPATIENT
Start: 2022-09-07 | End: 2022-09-09 | Stop reason: HOSPADM

## 2022-09-07 RX ORDER — CALCIUM CARBONATE 200(500)MG
1000 TABLET,CHEWABLE ORAL DAILY PRN
Status: DISCONTINUED | OUTPATIENT
Start: 2022-09-07 | End: 2022-09-09 | Stop reason: HOSPADM

## 2022-09-07 RX ORDER — LIDOCAINE HYDROCHLORIDE AND EPINEPHRINE 15; 5 MG/ML; UG/ML
INJECTION, SOLUTION EPIDURAL AS NEEDED
Status: DISCONTINUED | OUTPATIENT
Start: 2022-09-07 | End: 2022-09-07 | Stop reason: HOSPADM

## 2022-09-07 RX ORDER — VALACYCLOVIR HYDROCHLORIDE 500 MG/1
500 TABLET, FILM COATED ORAL 2 TIMES DAILY
Status: DISCONTINUED | OUTPATIENT
Start: 2022-09-07 | End: 2022-09-07

## 2022-09-07 RX ORDER — DOCUSATE SODIUM 100 MG/1
100 CAPSULE, LIQUID FILLED ORAL 2 TIMES DAILY
Status: DISCONTINUED | OUTPATIENT
Start: 2022-09-07 | End: 2022-09-09 | Stop reason: HOSPADM

## 2022-09-07 RX ORDER — DIAPER,BRIEF,INFANT-TODD,DISP
1 EACH MISCELLANEOUS DAILY PRN
Status: DISCONTINUED | OUTPATIENT
Start: 2022-09-07 | End: 2022-09-09 | Stop reason: HOSPADM

## 2022-09-07 RX ORDER — ONDANSETRON 2 MG/ML
4 INJECTION INTRAMUSCULAR; INTRAVENOUS EVERY 8 HOURS PRN
Status: DISCONTINUED | OUTPATIENT
Start: 2022-09-07 | End: 2022-09-09 | Stop reason: HOSPADM

## 2022-09-07 RX ORDER — CLINDAMYCIN PHOSPHATE 900 MG/50ML
900 INJECTION INTRAVENOUS EVERY 8 HOURS
Status: DISCONTINUED | OUTPATIENT
Start: 2022-09-07 | End: 2022-09-07

## 2022-09-07 RX ORDER — OXYTOCIN/RINGER'S LACTATE 30/500 ML
PLASTIC BAG, INJECTION (ML) INTRAVENOUS
Status: COMPLETED
Start: 2022-09-07 | End: 2022-09-07

## 2022-09-07 RX ORDER — SODIUM CHLORIDE 9 MG/ML
125 INJECTION, SOLUTION INTRAVENOUS CONTINUOUS
Status: DISCONTINUED | OUTPATIENT
Start: 2022-09-07 | End: 2022-09-09 | Stop reason: HOSPADM

## 2022-09-07 RX ORDER — ROPIVACAINE HYDROCHLORIDE 2 MG/ML
INJECTION, SOLUTION EPIDURAL; INFILTRATION; PERINEURAL CONTINUOUS PRN
Status: DISCONTINUED | OUTPATIENT
Start: 2022-09-07 | End: 2022-09-07 | Stop reason: HOSPADM

## 2022-09-07 RX ORDER — ROPIVACAINE HYDROCHLORIDE 2 MG/ML
INJECTION, SOLUTION EPIDURAL; INFILTRATION; PERINEURAL
Status: COMPLETED
Start: 2022-09-07 | End: 2022-09-07

## 2022-09-07 RX ORDER — ONDANSETRON 2 MG/ML
4 INJECTION INTRAMUSCULAR; INTRAVENOUS EVERY 6 HOURS PRN
Status: DISCONTINUED | OUTPATIENT
Start: 2022-09-07 | End: 2022-09-07

## 2022-09-07 RX ADMIN — DOCUSATE SODIUM 100 MG: 100 CAPSULE, LIQUID FILLED ORAL at 18:22

## 2022-09-07 RX ADMIN — CLINDAMYCIN IN 5 PERCENT DEXTROSE 900 MG: 18 INJECTION, SOLUTION INTRAVENOUS at 05:03

## 2022-09-07 RX ADMIN — LIDOCAINE HYDROCHLORIDE AND EPINEPHRINE 3 ML: 15; 5 INJECTION, SOLUTION EPIDURAL at 06:24

## 2022-09-07 RX ADMIN — ROPIVACAINE HYDROCHLORIDE: 2 INJECTION, SOLUTION EPIDURAL; INFILTRATION at 06:32

## 2022-09-07 RX ADMIN — DOCUSATE SODIUM 100 MG: 100 CAPSULE, LIQUID FILLED ORAL at 10:00

## 2022-09-07 RX ADMIN — Medication: at 08:38

## 2022-09-07 RX ADMIN — ROPIVACAINE HYDROCHLORIDE 10 ML/HR: 2 INJECTION, SOLUTION EPIDURAL; INFILTRATION at 06:29

## 2022-09-07 RX ADMIN — SODIUM CHLORIDE 999 ML/HR: 0.9 INJECTION, SOLUTION INTRAVENOUS at 05:02

## 2022-09-07 RX ADMIN — ROPIVACAINE HYDROCHLORIDE 5 ML: 2 INJECTION, SOLUTION EPIDURAL; INFILTRATION at 06:25

## 2022-09-07 RX ADMIN — IBUPROFEN 600 MG: 600 TABLET ORAL at 21:19

## 2022-09-07 NOTE — ASSESSMENT & PLAN NOTE
No results found for: HGBA1C    Recent Labs     09/07/22  0515   POCGLU 91       Blood Sugar Average: Last 72 hrs:  (P) 91     - A1GDM POCT glucose ordered  - Insulin gtt if FSBS >100 x2

## 2022-09-07 NOTE — ANESTHESIA PROCEDURE NOTES
Epidural Block    Patient location during procedure: OB  Start time: 9/7/2022 6:23 AM  Reason for block: primary anesthetic  Staffing  Performed: Anesthesiologist   Anesthesiologist: Kaycee Duarte DO  Preanesthetic Checklist  Completed: patient identified, IV checked, site marked, risks and benefits discussed, surgical consent, monitors and equipment checked, pre-op evaluation and timeout performed  Epidural  Patient position: sitting  Prep: Betadine  Patient monitoring: heart rate and frequent blood pressure checks  Approach: midline  Location: lumbar  Injection technique: DARLENE air  Needle  Needle type: Tuohy   Needle gauge: 17 G  Catheter type: end hole  Catheter size: 20 G  Catheter at skin depth: 10 cm  Catheter securement method: clear occlusive dressing  Test dose: negative  Assessment  Number of attempts: 1negative aspiration for CSF, negative aspiration for heme and no paresthesia on injection  patient tolerated the procedure well with no immediate complications

## 2022-09-07 NOTE — DISCHARGE SUMMARY
Discharge Summary - Shweta Olguin 28 y o  female MRN: [id number]    Unit/Bed#: L&D 36-1 Encounter: [id number]    Admission Date: 2022     Discharge Date: 2022    Admitting Diagnosis:   Patient Active Problem List   Diagnosis    Atypical squamous cell changes of undetermined significance (ASCUS) on cervical cytology with positive high risk human papilloma virus (HPV)    HSV (herpes simplex virus) infection    History of severe pre-eclampsia    GBS (group B Streptococcus carrier), +RV culture, currently pregnant    36 weeks gestation of pregnancy    Fetal renal anomaly, single gestation    COVID-19 affecting pregnancy in second trimester    Anemia affecting pregnancy in third trimester    GDM (gestational diabetes mellitus), class A1    Yeast vaginitis    Preeclampsia    Prenatal care in third trimester     labor in third trimester       Discharge Diagnosis:   Same, delivered    Procedures:   spontaneous vaginal delivery    Admitting Attending: Dr Piter Wiseman  Delivery Attending: Dr Isaiah Javier MD  Discharge Attending: Dr Silvia Richards Course:     Shweta Olguin is a 28 y o  [id number] who was admitted at 36w3d for  labor  She was noted to be 5 5/80/-2 on admission  She was noted to be preeclamptic, with elevated blood pressures in the mild range  Her pregnancy is complicated by GDM, A1  She was started on clindamycin for GBS prophylaxis  She was expectantly managed and progressed to be fully dilated and began pushing  She delivered a viable female  on 22 at 61 23 68  Weight 4lbs 14 3oz via spontaneous vaginal delivery  Apgars were 9 (1 min) and 9 (5 min)   was transferred to  nursery  Patient tolerated the procedure well and was transferred to recovery in stable condition  Her post-partum course was uncomplicated  She was kept for 2 days due to inadequate GBS ppx via clindamycin   Her post-partum pain was well controlled with oral analgesics  On day of discharge, she was ambulating and able to reasonably perform all ADLs  She was voiding and had appropriate bowel function  Pain was well controlled  She was discharged home on postpartum day #2 without complications  Patient was instructed to follow up with her OB as an outpatient and was given appropriate warnings to call doctor or provider if she develops signs of infection or uncontrolled pain  On day of discharge she was ambulating, voiding spontaneously, tolerating oral intake and hemodynamically stable  Mom's blood type is A positive and  Rhogam was not given  Disposition: Home    Planned Readmission: No    Discharge Medications:   Prenatal vitamin daily for 6 months or the duration of nursing, whichever is longer  Motrin 600 mg orally every 6 hours as needed for pain  Tylenol (over the counter) per bottle directions as needed for pain  Hydrocortisone cream 1% (over the counter) applied 1-2x daily to perineum as needed  Witch hazel pads for perineal discomfort as needed    Discharge instructions :   -Do not place anything (no partner, tampons or douche) in your vagina for 6 weeks  -You may walk for exercise for the first 6 weeks then gradually return to your usual activities    -Please do not drive for 1 week if you have no stitches and for 2 weeks if you have stitches or underwent a  delivery     -You may take baths or shower per your preference    -Please look at your bust (breasts) in the mirror daily and call your doctor for redness or tenderness or increased warmth  - If you have had a  section please look at your incision daily as well and call provider for increasing redness or steady drainage from the incision    -Please call your doctor's office if temperature > 100 4*F or 38* C, worsening pain or a foul discharge      Follow Up:  - Follow up in 3 weeks for postpartum visit

## 2022-09-07 NOTE — LACTATION NOTE
This note was copied from a baby's chart  CONSULT - LACTATION  Baby Girl Sulaiman Marx 0 days female MRN: [id number]    [facility] NICU Room / Bed: NICU OVR/NICU Milagros Douglas 39 Encounter: [id number]    Maternal Information     MOTHER:  Mireille Marx  Maternal Age: 28 y o    OB History: # 1 - Date: , Sex: None, Weight: None, GA: None, Delivery: Spontaneous , Apgar1: None, Apgar5: None, Living: None, Birth Comments: early first trimester SAB    # 2 - Date: 21, Sex: Male, Weight: None, GA: 33w1d, Delivery: Vaginal, Spontaneous, Apgar1: 8, Apgar5: 9, Living: Living, Birth Comments: None    # 3 - Date: 22, Sex: Female, Weight: 2220 g (4 lb 14 3 oz), GA: 36w3d, Delivery: Vaginal, Spontaneous, Apgar1: 9, Apgar5: 9, Living: Living, Birth Comments: None   Previouse breast reduction surgery? No    Lactation history:   Has patient previously breast fed: Yes   How long had patient previously breast fed: 9 months(older child is now 12 months old)   Previous breast feeding complications:       Past Surgical History:   Procedure Laterality Date    NO PAST SURGERIES          Birth information:  YOB: 2021   Time of birth: 8:37 AM   Sex: female   Delivery type: Vaginal, Spontaneous   Birth Weight: 2220 g (4 lb 14 3 oz)   Percent of Weight Change: 0%     Gestational Age: 43w3d   [unfilled]    Assessment     Breast and nipple assessment: normal assessment    [place] Assessment: sleepy and LPI on glucometer protocol    Feeding assessment: sluggish to latch  LATCH:  Latch: Grasps breast, tongue down, lips flanged, rhythmic sucking   Audible Swallowing: A few with stimulation   Type of Nipple: Everted (After stimulation)   Comfort (Breast/Nipple): Soft/non-tender   Hold (Positioning): Partial assist, teach one side, mother does other, staff holds   LATCH Score: 8          Feeding recommendations:  breast feed on demand     Has RSB and D/C booklets  Reviewed RSB   BF Hx: 9 months(older child is now 12 months old)  He effective for small droplets of colostrum  SNS at the breast for low serum glucose  GDM protocol  Encouraged pumping  LPI baby who went to NICU (after this assessment)  Worked on positioning infant up at chest level and starting to feed infant with nose arriving at the nipple  Then, using areolar compression to achieve a deep latch that is comfortable and exchanges optimum amounts of milk  Reviewed how to bring baby to the breast so that her lower lip and chin touch the breast with her nose just above the nipple to encourage a wider, more asymmetric latch  Information on hand expression given  Discussed benefits of knowing how to manually express breast including stimulating milk supply, softening nipple for latch and evacuating breast in the event of engorgement  Met with mother  Provided mother with Ready, Set, Baby booklet which contained information on:  Hand expression with access to QR codes to review hand expression  Positioning and latch reviewed as well as showing images of other feeding positions  Discussed the properties of a good latch in any position  Feeding on cue and what that means for recognizing infant's hunger, s/s that baby is getting enough milk and some s/s that breastfeeding dyad may need further help  Skin to Skin contact an benefits to mom and baby  Avoidance of pacifiers for the first month discussed  Gave information on common concerns, what to expect the first few weeks after delivery, preparing for other caregivers, and how partners can help  Resources for support also provided  Encouraged parents to call for assistance, questions, and concerns about breastfeeding  Extension provided          Rogelio Lauren RN 9/7/2022 4:19 PM

## 2022-09-07 NOTE — TELEPHONE ENCOUNTER
Regarding: Possible Labor  ----- Message from [facility] sent at 9/7/2022  2:53 AM EDT -----  "I am 36 wks and I am having contractions every 3 mins lasting 40 secs  "

## 2022-09-07 NOTE — ASSESSMENT & PLAN NOTE
- Bilateral urinary tract dilation  - S/p Nephro consult, recommended to have U/S within 48h of life

## 2022-09-07 NOTE — DISCHARGE INSTRUCTIONS

## 2022-09-07 NOTE — PLAN OF CARE
Problem: Knowledge Deficit  Goal: Verbalizes understanding of labor plan  Description: Assess patient/family/caregiver's baseline knowledge level and ability to understand information  Provide education via patient/family/caregiver's preferred learning method at appropriate level of understanding  1  Provide teaching at level of understanding  2  Provide teaching via preferred learning method(s)  Outcome: Progressing     Problem: Labor & Delivery  Goal: Manages discomfort  Description: Assess and monitor for signs and symptoms of discomfort  Assess patient's pain level regularly and per hospital policy  Administer medications as ordered  Support use of nonpharmacological methods to help control pain such as distraction, imagery, relaxation, and application of heat and cold  Collaborate with interdisciplinary team and patient to determine appropriate pain management plan  1  Include patient in decisions related to comfort  2  Offer non-pharmacological pain management interventions  3  Report ineffective pain management to physician  Outcome: Progressing  Goal: Patient vital signs are stable  Description: 1  Assess vital signs - vaginal delivery    Outcome: Progressing     Problem: BIRTH - VAGINAL/ SECTION  Goal: Fetal and maternal status remain reassuring during the birth process  Description: INTERVENTIONS:  - Monitor vital signs  - Monitor fetal heart rate  - Monitor uterine activity  - Monitor labor progression (vaginal delivery)  - DVT prophylaxis  - Antibiotic prophylaxis  Outcome: Progressing  Goal: Emotionally satisfying birthing experience for mother/fetus  Description: Interventions:  - Assess, plan, implement and evaluate the nursing care given to the patient in labor  - Advocate the philosophy that each childbirth experience is a unique experience and support the family's chosen level of involvement and control during the labor process   - Actively participate in both the patient's and family's teaching of the birth process  - Consider cultural, [employer] and age-specific factors and plan care for the patient in labor  Outcome: Progressing

## 2022-09-07 NOTE — ANESTHESIA PREPROCEDURE EVALUATION
Procedure:  LABOR ANALGESIA    Relevant Problems   ANESTHESIA (within normal limits)      CARDIO   (+) Preeclampsia      /RENAL   (+) Fetal renal anomaly, single gestation      GYN   (+) 36 weeks gestation of pregnancy      HEMATOLOGY   (+) Anemia affecting pregnancy in third trimester      NEURO/PSYCH   (+) History of severe pre-eclampsia        Physical Exam    Airway    Mallampati score: II  TM Distance: >3 FB  Neck ROM: full     Dental   No notable dental hx     Cardiovascular  Rhythm: regular, Rate: normal, Cardiovascular exam normal    Pulmonary  Pulmonary exam normal Breath sounds clear to auscultation,     Other Findings        Anesthesia Plan  ASA Score- 2     Anesthesia Type- epidural with ASA Monitors  Additional Monitors:   Airway Plan:           Plan Factors-    Chart reviewed  Existing labs reviewed  Patient summary reviewed  Patient is not a current smoker  Induction-     Postoperative Plan-     Informed Consent- Anesthetic plan and risks discussed with patient

## 2022-09-07 NOTE — H&P
H&P Exam - Obstetrics   Ric Prieto 28 y o  female MRN: [id number]  Unit/Bed#: L&D 323-01 Encounter: [id number]      ASSESSMENT:  27 yo  at 36w3d weeks gestation who is being admitted for pre-term labor  EFW: 2423 grams - 5 lbs 5 oz (31%)  VTX by transabdominal ultrasound     PLAN:  *  labor in third trimester  Assessment & Plan  Admit  Follow up CBC, RPR, Blood Type  Start with expectant management  Method of contraception: condoms  GBS  status: pos, Clindamycin for prophylaxis given PCN allergy  Analgesia and/or epidural at patient request  Anticipate     Preeclampsia  Assessment & Plan  - Systolic (64MBJ), QRC:911 , Min:146 , DYZ:582   - Diastolic (27OCB), BWI:169, Min:97, Max:104  - Asymptomatic  - CBC & CMP wnl, P/Cr pending  - Will continue monitoring Bps with plan to start Magnesium if severe features identified      GDM (gestational diabetes mellitus), class A1  Assessment & Plan  No results found for: HGBA1C    Recent Labs     22  0515   POCGLU 91       Blood Sugar Average: Last 72 hrs:  (P) 91     - A1GDM POCT glucose ordered  - Insulin gtt if FSBS >100 x2    HSV (herpes simplex virus) infection  Assessment & Plan  - Home Valtrex ordered  - Speculum exam with no lesions on vulva, vagina, or cervix    GBS (group B Streptococcus carrier), +RV culture, currently pregnant  Assessment & Plan  - PCN allergy, sensitivities done  - Clindamycin for ppx    Fetal renal anomaly, single gestation  Assessment & Plan  - Bilateral urinary tract dilation  - S/p Nephro consult, recommended to have U/S within 48h of life    Anemia affecting pregnancy in third trimester  Assessment & Plan  - Admission Hg: 10 9    Discussed with Dr Jerilyn Birmingham    This patient will be an INPATIENT  and I certify the anticipated length of stay is >2 Midnights      History of Present Illness     Chief Complaint: Contractions    HPI:  Ric Prieto is a 28 y o  [id number] female with an SERA of 10/2/2022, by Ultrasound at 36w3d weeks gestation who is being admitted for pre-term labor  She reports she started having contractions midnight that are every 3 minutes and lasting 40 seconds each with increasing intensity over the past few hours  Contractions: yes  Loss of fluid: no  Vaginal bleeding: no  Fetal movement: yes    She is a [facility] patient  PREGNANCY COMPLICATIONS:   1) Pre-E w/o SF  2) HSV  3) A1GDM  4) GBS positive  5) COVID in 2nd trimester    OB History    Para Term  AB Living   3 1   1 1 1   SAB IAB Ectopic Multiple Live Births   1     0 1      # Outcome Date GA Lbr Grupo/2nd Weight Sex Delivery Anes PTL Lv   3 Current            2  21 33w1d / 00:09  M Vag-Spont EPI  SUMIT   1 SAB      SAB         Birth Comments: early first trimester SAB       Baby complications/comments: Bilateral urinary tract dilation, baby to have U/S within 48h of life    Review of Systems   Constitutional: Negative for chills and fever  Eyes: Negative for visual disturbance  Respiratory: Negative for chest tightness and shortness of breath  Gastrointestinal:        Pain with contractions   Genitourinary: Positive for pelvic pain  Negative for vaginal bleeding, vaginal discharge and vaginal pain  Neurological: Negative for headaches           Historical Information   Past Medical History:   Diagnosis Date    Abnormal Pap smear of cervix     Environmental allergies     Fetal renal anomaly, single gestation 2022    Genital herpes     Seasonal allergies     Severe pre-eclampsia in third trimester 2021     (spontaneous vaginal delivery) 2021    Varicella      Past Surgical History:   Procedure Laterality Date    NO PAST SURGERIES       Social History   Social History     Substance and Sexual Activity   Alcohol Use Not Currently    Comment: social/prior to pregnancy     Social History     Substance and Sexual Activity   Drug Use Never     Social History     Tobacco Use   Smoking Status Never Smoker   Smokeless Tobacco Never Used     Family History: non-contributory    Meds/Allergies      Medications Prior to Admission   Medication    ferrous sulfate 324 (65 Fe) mg    Prenatal MV-Min-Fe Fum-FA-DHA (PRENATAL 1 PO)    valACYclovir (VALTREX) 500 mg tablet    aspirin (ECOTRIN LOW STRENGTH) 81 mg EC tablet    Blood Glucose Monitoring Suppl (OneTouch Verio Flex System) w/Device KIT    OneTouch Delica Lancets 71T MISC    OneTouch Verio test strip        Allergies   Allergen Reactions    Penicillins Rash       OBJECTIVE:    Vitals: Blood pressure (!) 139/103, pulse 88, temperature 98 °F (36 7 °C), temperature source Oral, resp  rate 20, last menstrual period 12/13/2021, currently breastfeeding  There is no height or weight on file to calculate BMI  Physical Exam  HENT:      Head: Normocephalic  Mouth/Throat:      Pharynx: Oropharynx is clear  Eyes:      Conjunctiva/sclera: Conjunctivae normal    Cardiovascular:      Rate and Rhythm: Normal rate  Pulses: Normal pulses  Pulmonary:      Effort: Pulmonary effort is normal    Abdominal:      General: There is no distension  Palpations: Abdomen is soft  Genitourinary:     General: Normal vulva  Comments: Speculum: no vaginal, cervical or vulvar herpetic vesicles or lesions  Skin:     General: Skin is warm  Capillary Refill: Capillary refill takes less than 2 seconds  Neurological:      Mental Status: She is alert and oriented to person, place, and time     Psychiatric:         Mood and Affect: Mood normal          Cervix:  5 5/80/-2    Fetal heart rate:   Baseline Rate: 125 bpm  Variability: Moderate 6-25 bpm  Accelerations: 15 x 15 or greater  Decelerations: None    Hollister:   Contraction Frequency (minutes): 1-4  Contraction Duration (seconds):   Contraction Quality: Moderate    Prenatal Labs:   Blood Type:   Lab Results   Component Value Date/Time    ABO Grouping A 07/11/2022 04:04 PM     , D (Rh type):   Lab Results   Component Value Date/Time    Rh Factor Positive 07/11/2022 04:04 PM     , Antibody Screen: negative   , HCT/HGB:   Lab Results   Component Value Date/Time    Hematocrit 34 2 (L) 09/07/2022 04:57 AM    Hemoglobin 10 9 (L) 09/07/2022 04:57 AM      , MCV:   Lab Results   Component Value Date/Time    MCV 77 (L) 09/07/2022 04:57 AM      , Platelets:   Lab Results   Component Value Date/Time    Platelets 446 53/03/0973 04:57 AM      , 1 hour Glucola:   Lab Results   Component Value Date/Time    Glucose 164 (H) 07/14/2022 12:46 PM   , 3 hour GTT: fasting elevated  Varicella: No results found for: VARICELLAIGG    , Rubella:   Lab Results   Component Value Date/Time    Rubella IgG Quant 28 4 03/21/2022 09:39 AM        , VDRL/RPR:   Lab Results   Component Value Date/Time    RPR Non-Reactive 07/11/2022 04:04 PM      , Hep B:   Lab Results   Component Value Date/Time    Hepatitis B Surface Ag Non-reactive 03/21/2022 09:39 AM     , Hep C: non-reactive     , HIV:   Lab Results   Component Value Date/Time    HIV-1/HIV-2 Ab Non-Reactive 03/21/2022 09:39 AM     , Chlamydia: negative    , Gonorrhea:   Lab Results   Component Value Date/Time    N gonorrhoeae, DNA Probe Negative 03/24/2022 08:40 AM         Invasive Devices  Timeline    Peripheral Intravenous Line  Duration           Peripheral IV 09/07/22 Distal;Dorsal (posterior); Left Forearm <1 day                  Edith Dance, MD  PGY-2  9/7/2022  5:31 AM

## 2022-09-07 NOTE — ASSESSMENT & PLAN NOTE
- Systolic (31IRB), CRUZITO:491 , Min:125 , XEB:870   - Diastolic (71HOH), DPS:02, Min:79, Max:128  - Asymptomatic this morning

## 2022-09-07 NOTE — ANESTHESIA POSTPROCEDURE EVALUATION
Post-Op Assessment Note    CV Status:  Stable    Pain management: adequate     Mental Status:  Alert and awake   Hydration Status:  Euvolemic   PONV Controlled:  Controlled   Airway Patency:  Patent      Post Op Vitals Reviewed: Yes      Staff: Anesthesiologist     Post-op block assessment: no complications and catheter intact      No complications documented    /85   Pulse 78   Temp 98 °F (36 7 °C)   Resp 20   Ht 5' (1 524 m)   Wt 74 kg (163 lb 2 3 oz)   LMP 12/13/2021 (Exact Date)   SpO2 97%   BMI 31 86 kg/m²   BP      Temp      Pulse     Resp      SpO2

## 2022-09-07 NOTE — TELEPHONE ENCOUNTER
Reason for Disposition   [1] History of prior delivery (multipara) AND [2] contractions < 10 minutes apart AND [3] present 1 hour    Answer Assessment - Initial Assessment Questions  1  ONSET: "When did the symptoms begin?"         midnight  2  CONTRACTIONS: "Describe the contractions that you are having " (e g , duration, frequency, regularity, severity)      Three minutes apart lasting 40 seconds  3  SERA: "What date are you expecting to deliver?"      10/2  4  PARITY: "Have you had a baby before?" If Yes, ask: "How long did the labor last?"      second  5  FETAL MOVEMENT: "Has the baby's movement decreased or changed significantly from normal?"      Denies  6   OTHER SYMPTOMS: "Do you have any other symptoms?" (e g , leaking fluid from vagina, vaginal bleeding, fever, hand/facial swelling)      Denies    Protocols used: PREGNANCY - LABOR-ADULT-

## 2022-09-07 NOTE — ASSESSMENT & PLAN NOTE
Admit  Follow up CBC, RPR, Blood Type  Start with expectant management  Method of contraception: condoms  GBS  status: pos, Clindamycin for prophylaxis given PCN allergy  Analgesia and/or epidural at patient request  Anticipate

## 2022-09-08 PROCEDURE — 99024 POSTOP FOLLOW-UP VISIT: CPT | Performed by: OBSTETRICS & GYNECOLOGY

## 2022-09-08 RX ADMIN — DOCUSATE SODIUM 100 MG: 100 CAPSULE, LIQUID FILLED ORAL at 08:31

## 2022-09-08 RX ADMIN — DOCUSATE SODIUM 100 MG: 100 CAPSULE, LIQUID FILLED ORAL at 18:08

## 2022-09-08 NOTE — LACTATION NOTE
Marilin Cline reports pumping Q3 hours  She reports getting very little with the breast pump  Marilin Cline declines reviewing RSB and D/C booklets as it has been a short interval since last child who is now 12 months old  She declines pumping log  Encouraged parents to call for assistance, questions, and concerns about breastfeeding  Extension provided

## 2022-09-08 NOTE — PROGRESS NOTES
Save your life magnet and discharge handouts given to pt  Discharge teaching completed, questions and concerns answered

## 2022-09-08 NOTE — L&D DELIVERY NOTE
DELIVERY NOTE  Sania Bejarano 28 y o  female MRN: [id number]  Unit/Bed#: L&D 312-01 Encounter: [id number]    Obstetrician:   Alana Linares MD  Pre-Delivery Diagnosis: Term pregnancy   pregnancy <37 weeks  Spontaneous labor  Single fetus    Post-Delivery Diagnosis: Same as above - Delivered    Procedure: Spontaneous vaginal delivery    Delivery Date and Time:  2022 72:58:12 AM    Umbilical Artery  Recent Labs     22  0839   PHCVEN 7 332   BECVEN -5 3*       Umbilical Vein  Recent Labs     22  0839   PHCART 7 194*   BECART -8 7*       Apgars: 9, 9    Weight: 2200 grams    Quantified blood loss (mL): 491           Complications:  None    Brief description of labor:  Please see additional notes for details of the labor course  The patient presented in spontaneous labor  She progressed to 9 cm with a bulging bag and had an urge to push  She had AROM for clear fluid and then began pushing  Description of Procedure: With maternal expulsive efforts, the patient delivered a viable female   The position at delivery was OA  The fetal vertex delivered spontaneously  There was no nuchal cord  The anterior shoulder delivered atraumatically with maternal expulsive forces and the assistance of gentle downward traction  The posterior shoulder delivered with maternal expulsive forces and the assistance of gentle upward traction  The remainder of the fetus delivered spontaneously  Upon delivery, the  was placed on the mother's abdomen and the umbilical cord was clamped and cut  The  resuscitator evaluated the  at bedside  Arterial and venous cord blood gases and cord blood were collected for analysis  These were sent to the lab  Active management of the third stage of labor included uterine massage and administration of IV Pitocin at 250 thao-units per minute  The uterus was noted to contract down well   The placenta delivered spontaneously and was noted to have a centrally-inserted 3-vessel cord  It was sent for pathology  The vagina, cervix, perineum, and rectum were inspected and there was noted to be a small first degree laceration that was superficial and hemostatic  It did not require repair  The repair inspected and found to have good tissue reapproximation and hemostasis  Hemostasis was confirmed at the conclusion of this procedure  At the conclusion of the delivery, all needle, sponge, and instrument counts were noted to be correct  The patient tolerated the procedure well and was allowed to recover in labor and delivery room       Yesi Christianson MD  [address] [place] [address]  9/7/2022 10:44 PM

## 2022-09-08 NOTE — PROGRESS NOTES
Progress Note - OB/GYN  Paul Bingham 28 y o  female MRN: [id number]  Unit/Bed#: L&D 312-01 Encounter: [id number]    Assessment and Plan     Paul Bingham is a patient of: Konstantin King  She is PPD# 1 s/p  spontaneous vaginal delivery  Recovering well and is stable       Preeclampsia without severe features  Assessment & Plan  - Systolic (16UFZ), RWL:303 , Min:125 , CPV:996   - Diastolic (85MOG), AAV:16, Min:79, Max:128  - Asymptomatic this morning    GDM (gestational diabetes mellitus), class A1  Assessment & Plan  No results found for: HGBA1C    Recent Labs     22  0515   POCGLU 91       Blood Sugar Average: Last 72 hrs:  (P) 91     - A1GDM POCT glucose ordered  - Insulin gtt if FSBS >100 x2    Anemia affecting pregnancy in third trimester  Assessment & Plan  - Admission Hg: 10 9    Fetal renal anomaly, single gestation  Assessment & Plan  - Bilateral urinary tract dilation  - S/p Nephro consult, recommended to have U/S within 48h of life    GBS (group B Streptococcus carrier), +RV culture, currently pregnant  Assessment & Plan  - PCN allergy, sensitivities done  - Clindamycin for ppx    HSV (herpes simplex virus) infection  Assessment & Plan  - Home Valtrex ordered  - Speculum exam with no lesions on vulva, vagina, or cervix    *  (spontaneous vaginal delivery)  Assessment & Plan  Lochia WNL   Recovering well   Voiding appropriately  Passing flatus, no BM  Pain well controlled   Tolerating diet   Breastfeeding and pumping  Ambulating without issues   No lower extremity tenderness  GBS positive  Rh postiive   Postpartum Contraception: Condoms, further discussion as outpatient  Options reviewed postpartum  Anticipate D/C on PPD#2      Disposition    - Anticipate discharge home on PPD# 2    Subjective/Objective     Chief Complaint: Postpartum State     Subjective:    Paul Bingham is PPD/POD#1 s/p  spontaneous vaginal delivery  She has no current complaints    Pain is well controlled  Patient is currently voiding  She is ambulating  Patient is currently passing flatus and has had no bowel movement  She is tolerating PO, and denies nausea or vomitting  Patient denies fever, chills, chest pain, shortness of breath, or calf tenderness  Lochia is normal  She is  Breastfeeding and Using breast pump  She is recovering well and is stable  Vitals:   /82 (BP Location: Left arm)   Pulse 70   Temp 98 °F (36 7 °C) (Oral)   Resp 18   Ht 5' (1 524 m)   Wt 74 kg (163 lb 2 3 oz)   LMP 12/13/2021 (Exact Date)   SpO2 98%   Breastfeeding Unknown   BMI 31 86 kg/m²       Intake/Output Summary (Last 24 hours) at 9/8/2022 0558  Last data filed at 9/7/2022 1823  Gross per 24 hour   Intake --   Output 950 ml   Net -950 ml       Invasive Devices  Timeline    Peripheral Intravenous Line  Duration           Peripheral IV 09/07/22 Distal;Dorsal (posterior); Left Forearm 1 day                Physical Exam:   GEN: Yaquelin Purple appears well, alert and oriented x 3, pleasant and cooperative   CARDIO: RRR, no murmurs or rubs  RESP:  CTAB, no wheezes or rales  ABDOMEN: soft, no tenderness, no distention, fundus @ umbilicus  EXTREMITIES: non tender, no erythema    Labs:     Hemoglobin   Date Value Ref Range Status   09/07/2022 10 9 (L) 11 5 - 15 4 g/dL Final   09/01/2022 10 5 (L) 11 5 - 15 4 g/dL Final     WBC   Date Value Ref Range Status   09/07/2022 12 46 (H) 4 31 - 10 16 Thousand/uL Final   09/01/2022 9 44 4 31 - 10 16 Thousand/uL Final     Platelets   Date Value Ref Range Status   09/07/2022 246 149 - 390 Thousands/uL Final   09/01/2022 283 149 - 390 Thousands/uL Final     Creatinine   Date Value Ref Range Status   09/07/2022 0 68 0 60 - 1 30 mg/dL Final     Comment:     Standardized to IDMS reference method   09/01/2022 0 60 0 60 - 1 30 mg/dL Final     Comment:     Standardized to IDMS reference method     AST   Date Value Ref Range Status   09/07/2022 18 5 - 45 U/L Final     Comment: Specimen collection should occur prior to Sulfasalazine administration due to the potential for falsely depressed results  09/01/2022 17 5 - 45 U/L Final     Comment:     Specimen collection should occur prior to Sulfasalazine administration due to the potential for falsely depressed results  ALT   Date Value Ref Range Status   09/07/2022 20 12 - 78 U/L Final     Comment:     Specimen collection should occur prior to Sulfasalazine administration due to the potential for falsely depressed results  09/01/2022 17 12 - 78 U/L Final     Comment:     Specimen collection should occur prior to Sulfasalazine administration due to the potential for falsely depressed results             Saurabh Abraham DO  9/8/2022  5:58 AM

## 2022-09-08 NOTE — CASE MANAGEMENT
Case Management Progress Note    Patient name Myron Suarez  Location L&D 312/L&D 122-18 MRN [id number]  : 1990 Date 2022       LOS (days): 1  Geometric Mean LOS (GMLOS) (days):   Days to [address]:        OBJECTIVE:        Current admission status: Inpatient  Preferred Pharmacy:   [address] [place] [id number]  Phone: [phone] Fax: [phone] [address] [facility] [address] ,  [place] [address] [facility] [address]  Phone: [phone] Fax: [phone]    Primary Care Provider: No primary care provider on file  Primary Insurance: [address] [place] [address]  Secondary Insurance:     PROGRESS NOTE:    MOB requested Spectra S2; approved and delivered to room

## 2022-09-08 NOTE — ASSESSMENT & PLAN NOTE
Lochia WNL   Recovering well   Voiding appropriately  Passing flatus, no BM  Pain well controlled   Tolerating diet   Breastfeeding and pumping  Ambulating without issues   No lower extremity tenderness  GBS positive  Rh postiive   Postpartum Contraception: Condoms, further discussion as outpatient   Options reviewed postpartum  Plan for D/C on PPD#2 today/ Interested in [place]

## 2022-09-09 VITALS
BODY MASS INDEX: 32.03 KG/M2 | OXYGEN SATURATION: 98 % | HEIGHT: 60 IN | WEIGHT: 163.14 LBS | DIASTOLIC BLOOD PRESSURE: 84 MMHG | HEART RATE: 61 BPM | TEMPERATURE: 97.8 F | RESPIRATION RATE: 18 BRPM | SYSTOLIC BLOOD PRESSURE: 135 MMHG

## 2022-09-09 PROCEDURE — 99024 POSTOP FOLLOW-UP VISIT: CPT | Performed by: OBSTETRICS & GYNECOLOGY

## 2022-09-09 RX ORDER — IBUPROFEN 600 MG/1
600 TABLET ORAL EVERY 6 HOURS PRN
Qty: 30 TABLET | Refills: 0
Start: 2022-09-09

## 2022-09-09 RX ORDER — ACETAMINOPHEN 325 MG/1
650 TABLET ORAL EVERY 6 HOURS PRN
Refills: 0
Start: 2022-09-09

## 2022-09-09 RX ADMIN — DOCUSATE SODIUM 100 MG: 100 CAPSULE, LIQUID FILLED ORAL at 09:10

## 2022-09-09 NOTE — PROGRESS NOTES
Progress Note - OB/GYN  Gonzales Edouard 28 y o  female MRN: [id number]  Unit/Bed#: L&D 312-01 Encounter: [id number]    Assessment and Plan     Gonzales Edouard is a patient of: [address] [place]  She is PPD# 2 s/p  spontaneous vaginal delivery  Recovering well and is stable       Preeclampsia without severe features  Assessment & Plan  - Systolic (92BMW), KKE:218 , Min:125 , JBH:603   - Diastolic (70LLI), NVY:93, Min:79, Max:128  - Asymptomatic this morning    GDM (gestational diabetes mellitus), class A1  Assessment & Plan  No results found for: HGBA1C    Recent Labs     22  0515   POCGLU 91       Blood Sugar Average: Last 72 hrs:  (P) 91     - A1GDM POCT glucose ordered  - Insulin gtt if FSBS >100 x2    Anemia affecting pregnancy in third trimester  Assessment & Plan  - Admission Hg: 10 9    Fetal renal anomaly, single gestation  Assessment & Plan  - Bilateral urinary tract dilation  - S/p Nephro consult, recommended to have U/S within 48h of life    GBS (group B Streptococcus carrier), +RV culture, currently pregnant  Assessment & Plan  - PCN allergy, sensitivities done  - Clindamycin for ppx, inadequately treated    HSV (herpes simplex virus) infection  Assessment & Plan  - Home Valtrex ordered  - Speculum exam with no lesions on vulva, vagina, or cervix    *  (spontaneous vaginal delivery)  Assessment & Plan  Lochia WNL   Recovering well   Voiding appropriately  Passing flatus, no BM  Pain well controlled   Tolerating diet   Breastfeeding and pumping  Ambulating without issues   No lower extremity tenderness  GBS positive  Rh postiive   Postpartum Contraception: Condoms, further discussion as outpatient  Options reviewed postpartum  Plan for D/C on PPD#2 today/ Interested in [address] discharge home on PPD# 2    Subjective/Objective     Chief Complaint: Postpartum State     Subjective:    Gonzales Edouard is PPD/POD#2 s/p  spontaneous vaginal delivery   She has no current complaints  Pain is well controlled  Patient is currently voiding  She is ambulating  Patient is currently passing flatus and has had no bowel movement  She is tolerating PO, and denies nausea or vomitting  Patient denies fever, chills, chest pain, shortness of breath, or calf tenderness  Lochia is normal  She is Breastfeeding and Using breast pump  She is recovering well and is stable  Vitals:   /94 (BP Location: Left arm)   Pulse 75   Temp 97 9 °F (36 6 °C) (Oral)   Resp 18   Ht 5' (1 524 m)   Wt 74 kg (163 lb 2 3 oz)   LMP 12/13/2021 (Exact Date)   SpO2 98%   Breastfeeding Yes   BMI 31 86 kg/m²     No intake or output data in the 24 hours ending 09/09/22 0613    Invasive Devices  Timeline    None                 Physical Exam:   GEN: Stephan Curtis appears well, alert and oriented x 3, pleasant and cooperative   CARDIO: RRR, no murmurs or rubs  RESP:  CTAB, no wheezes or rales  ABDOMEN: soft, no tenderness, no distention, fundus @ u-1  EXTREMITIES:  non tender, no erythema    Labs:     Hemoglobin   Date Value Ref Range Status   09/07/2022 10 9 (L) 11 5 - 15 4 g/dL Final   09/01/2022 10 5 (L) 11 5 - 15 4 g/dL Final     WBC   Date Value Ref Range Status   09/07/2022 12 46 (H) 4 31 - 10 16 Thousand/uL Final   09/01/2022 9 44 4 31 - 10 16 Thousand/uL Final     Platelets   Date Value Ref Range Status   09/07/2022 246 149 - 390 Thousands/uL Final   09/01/2022 283 149 - 390 Thousands/uL Final     Creatinine   Date Value Ref Range Status   09/07/2022 0 68 0 60 - 1 30 mg/dL Final     Comment:     Standardized to IDMS reference method   09/01/2022 0 60 0 60 - 1 30 mg/dL Final     Comment:     Standardized to IDMS reference method     AST   Date Value Ref Range Status   09/07/2022 18 5 - 45 U/L Final     Comment:     Specimen collection should occur prior to Sulfasalazine administration due to the potential for falsely depressed results      09/01/2022 17 5 - 45 U/L Final     Comment: Specimen collection should occur prior to Sulfasalazine administration due to the potential for falsely depressed results  ALT   Date Value Ref Range Status   09/07/2022 20 12 - 78 U/L Final     Comment:     Specimen collection should occur prior to Sulfasalazine administration due to the potential for falsely depressed results  09/01/2022 17 12 - 78 U/L Final     Comment:     Specimen collection should occur prior to Sulfasalazine administration due to the potential for falsely depressed results             Indio Allan DO  9/9/2022  6:13 AM

## 2022-09-09 NOTE — CASE MANAGEMENT
Case Management Assessment    Patient name Chris [place]  Location L&D 312/L&D 034-14 MRN [id number]  : 1990 Date 2022       Current Admission Date: 2022  Current Admission Diagnosis: (spontaneous vaginal delivery)   Patient Active Problem List    Diagnosis Date Noted     labor in third trimester 2022    Prenatal care in third trimester 2022    Preeclampsia without severe features 2022    Yeast vaginitis 08/15/2022    GDM (gestational diabetes mellitus), class A1 08/10/2022    Anemia affecting pregnancy in third trimester 2022    COVID-19 affecting pregnancy in second trimester 2022    Fetal renal anomaly, single gestation 2022     (spontaneous vaginal delivery) 2022    GBS (group B Streptococcus carrier), +RV culture, currently pregnant 2022    History of severe pre-eclampsia 2022    HSV (herpes simplex virus) infection 2021    Atypical squamous cell changes of undetermined significance (ASCUS) on cervical cytology with positive high risk human papilloma virus (HPV) 2020      LOS (days): 2  Geometric Mean LOS (GMLOS) (days):   Days to GMLOS:     OBJECTIVE:    Risk of Unplanned Readmission Score: 4 4         Current admission status: Inpatient       Preferred Pharmacy:   [facility] [id number]  Phone: [phone] Fax: [phone]    [facility] , [address] [facility] ,  [address] [place] [address]  Phone: [phone] Fax: [phone]    Primary Care Provider: No primary care provider on file  Primary Insurance: [address] [place] [address]  Secondary Insurance:     ASSESSMENT:  Active Health Care Proxies    There are no active Health Care Proxies on file         Consult(s):  NICU baby     · Delivery method/date:  22  · Age: Gestational age 43w3d  · Admitted to NICU for hypothermia and hypoglycemia    SW met w/MOB who provided the following information:      · Baby's name/gender: Baby Girl Marx  · Mother of baby: Gonzales Edouard  · Father of baby//SO: Debora Cohn   · Other Legal Guardian(s) for baby: No  · Alternate emergency contact: Paternal grandmother Gila Veronica  · Other children: 1 6 y/o boy - Nevin Adkins  · Lives with: MOB, FOB, son  · Support System: Family, friends  · Baby Supplies: has all  · Bottle or Breast Feeding: Both  · Breast Pump if breast feeding: Spectra S2 given to MOB yesterday  · Work/School: MOB stay at 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, FOB works FT from home  · Transportation: Both parents drive  · Pediatrician: [facility]  · [address] Hx or Treatment: MOB denies  · Substance Abuse: MOB denies  · Hx DV/IPV: MOB denies  · Community Referrals/C&Y/NFP: MOB denies  · Insurance for baby: MOB unsure if they will add baby to MOB's plan or FOB's plan, but is aware baby will only be covered under MOB's plan for 30 days  · NICU Resources and packet: provided     SW reviewed discharge planning process including the following: identifying caregivers at home, preference for d/c planning needs, availability of [facility] Med[facility] to Bed program, availability of treatment team to discuss questions or concerns patient and/or family may have regarding diagnosis, plan of care, old or new medications and discharge planning   SW will continue to follow for care coordination and update assessment as appropriate       SW will follow infant in NICU through dc

## 2022-09-11 DIAGNOSIS — B00.9 HSV (HERPES SIMPLEX VIRUS) INFECTION: ICD-10-CM

## 2022-09-11 RX ORDER — VALACYCLOVIR HYDROCHLORIDE 500 MG/1
500 TABLET, FILM COATED ORAL 2 TIMES DAILY
Qty: 60 TABLET | Refills: 1 | Status: SHIPPED | OUTPATIENT
Start: 2022-09-11 | End: 2022-11-10

## 2022-09-12 NOTE — UTILIZATION REVIEW
Inpatient Admission Authorization Request   Notification of Maternity/Delivery &  Birth Information for Admission   SERVICING FACILITY:   [address] [place] [address] [facility]  Tax ID: [id number]  NPI: [id number]  Place of Service: Inpatient [address] [facility]  [address]  Place of Service Code: 24     ATTENDING PROVIDER:  Attending Name and NPI#: Noelle Diaz, [address]  Address: [address] [place] [address], [place] [address] [facility]  Phone: [phone]     UTILIZATION REVIEW CONTACT:  Les Soria Utilization   Network Utilization Review Department  Phone: [phone]  Fax [phone]  Email: [email or web address]     PHYSICIAN ADVISORY SERVICES:  FOR PPCV-JO-XARO REVIEW - MEDICAL NECESSITY DENIAL  Phone: [phone]  Fax: [phone]  Email: [email or web address]     TYPE OF REQUEST:  Inpatient Status     ADMISSION INFORMATION:  ADMISSION DATE/TIME: 22  4:31 AM  PATIENT DIAGNOSIS CODE/DESCRIPTION:  Uterine contractions at greater than 20 weeks of gestation [O47 9] The primary encounter diagnosis was  (spontaneous vaginal delivery)  Diagnoses of 36 weeks gestation of pregnancy and Postpartum care and examination of lactating mother were also pertinent to this visit  1   (spontaneous vaginal delivery)    2  36 weeks gestation of pregnancy    3   Postpartum care and examination of lactating mother      DISCHARGE DATE/TIME: 2022  3:30 PM   MOTHER AND  INFORMATION:  Mother: Sania Bejarano 1990   Delivering clinician: [facility]   OB History        3    Para   2    Term           2    AB   1    Living   2       SAB   1    IAB        Ectopic        Multiple   0    Live Births   2                Name & MRN:   Information for the patient's :  Dorian Pace [[id number]]      Delivery Information:  Sex: female  Delivered 2022 8:37 AM by Vaginal, Spontaneous; Gestational Age: 43w3d     Measurements:  Weight: 4 lb 14 3 oz (2220 g); Height: 18"    APGAR 1 minute 5 minutes 10 minutes   Totals: 9 9      [place] Birth Information: 28 y o  female MRN: [id number] Unit/Bed#: L&D 312-01 Estimated Date of Delivery: 10/2/22  Birthweight: No birth weight on file  Gestational Age: <None> Delivery Type: Vaginal, Spontaneous      IMPORTANT INFORMATION:  Please contact Venus Morales directly with any questions or concerns regarding this request  Department voicemails are confidential     Send requests for admission clinical reviews, concurrent reviews, approvals, and administrative denials due to lack of clinical to fax [phone]  Notification of Discharge   This is a Notification of Discharge from our facility [address]  Please be advised that this patient has been discharge from our facility  Below you will find the admission and discharge date and time including the patients disposition  UTILIZATION REVIEW CONTACT:  Rashmi Jacques  Utilization   Network Utilization Review Department  Phone: [phone] x carefully listen to the prompts  All voicemails are confidential   Email: [email or web address]     PHYSICIAN ADVISORY SERVICES:  FOR VJCA-GV-VMTE REVIEW - MEDICAL NECESSITY DENIAL  Phone: [phone]  Fax: [phone]  Email: [email or web address]     PRESENTATION DATE: 2022  3:50 AM    INPATIENT ADMISSION DATE: 22  4:31 AM   DISCHARGE DATE: 2022  3:30 PM  DISPOSITION: Home/Self Care Home/Self Care      IMPORTANT INFORMATION:  Send all requests for admission clinical reviews, approved or denied determinations and any other requests to dedicated fax number below belonging to the campus where the patient is receiving treatment   List of dedicated fax numbers:  [address] (Administrative/Medical Necessity) [phone] [address] (Maternity/NICU/Pediatrics) [facility] [phone]   [address] [phone]   [address] [place] [address] [phone]    [facility] [phone]   Clare Mercado [phone]   Shaunna Reynolds [phone]   Sa[address]l Morales [phone]   [facility]  [phone]   [address]  [address] [facility] [phone]   Drake Quiñonesa [phone]          Ramesh Adrian MD   Resident   OB/GYN   Discharge Summary      Attested   Date of Service:  2022  2:21 PM                 Attested            Attestation signed by Gordo Larios MD at 2022 11:25 PM     OB Hospitalist on Call       I agree with the discharge summary as dictated by Dr Brannon Alves as well as her documentation of her admission        [facility] MD   OB/GYN Hospitalist   [phone]   2022        11:25 PM                       Discharge Summary - Isis Hathaway 28 y o  female MRN: [id number]     Unit/Bed#: L&D 312-01 Encounter: [id number]     Admission Date: 2022      Discharge Date: 2022     Admitting Diagnosis:       Patient Active Problem List   Diagnosis    Atypical squamous cell changes of undetermined significance (ASCUS) on cervical cytology with positive high risk human papilloma virus (HPV)    HSV (herpes simplex virus) infection    History of severe pre-eclampsia    GBS (group B Streptococcus carrier), +RV culture, currently pregnant    36 weeks gestation of pregnancy    Fetal renal anomaly, single gestation    COVID-19 affecting pregnancy in second trimester    Anemia affecting pregnancy in third trimester    GDM (gestational diabetes mellitus), class A1    Yeast vaginitis    Preeclampsia    Prenatal care in third trimester     labor in third trimester         Discharge Diagnosis:   Same, delivered     Procedures: spontaneous vaginal delivery     Admitting Attending: Dr Michael Montiel  Delivery Attending: Dr Chen Garcia MD  Discharge Attending: Dr Yeison Conway Course:      Santa Kay is a 28 y o  [id number] who was admitted at 36w3d for  labor  She was noted to be 5 5/80/-2 on admission  She was noted to be preeclamptic, with elevated blood pressures in the mild range  Her pregnancy is complicated by GDM, A1  She was started on clindamycin for GBS prophylaxis  She was expectantly managed and progressed to be fully dilated and began pushing         She delivered a viable female  on 22 at 61 23 68  Weight 4lbs 14 3oz via spontaneous vaginal delivery  Apgars were 9 (1 min) and 9 (5 min)   was transferred to  nursery  Patient tolerated the procedure well and was transferred to recovery in stable condition       Her post-partum course was uncomplicated  She was kept for 2 days due to inadequate GBS ppx via clindamycin  Her post-partum pain was well controlled with oral analgesics      On day of discharge, she was ambulating and able to reasonably perform all ADLs  She was voiding and had appropriate bowel function  Pain was well controlled  She was discharged home on postpartum day #2 without complications  Patient was instructed to follow up with her OB as an outpatient and was given appropriate warnings to call doctor or provider if she develops signs of infection or uncontrolled pain      On day of discharge she was ambulating, voiding spontaneously, tolerating oral intake and hemodynamically stable  Mom's blood type is A positive and  Rhogam was not given      Disposition: Home     Planned Readmission: No     Discharge Medications:   Prenatal vitamin daily for 6 months or the duration of nursing, whichever is longer    Motrin 600 mg orally every 6 hours as needed for pain  Tylenol (over the counter) per bottle directions as needed for pain  Hydrocortisone cream 1% (over the counter) applied 1-2x daily to perineum as needed  Witch hazel pads for perineal discomfort as needed     Discharge instructions :   -Do not place anything (no partner, tampons or douche) in your vagina for 6 weeks  -You may walk for exercise for the first 6 weeks then gradually return to your usual activities    -Please do not drive for 1 week if you have no stitches and for 2 weeks if you have stitches or underwent a  delivery     -You may take baths or shower per your preference    -Please look at your bust (breasts) in the mirror daily and call your doctor for redness or tenderness or increased warmth     - If you have had a  section please look at your incision daily as well and call provider for increasing redness or steady drainage from the incision    -Please call your doctor's office if temperature > 100 4*F or 38* C, worsening pain or a foul discharge      Follow Up:  - Follow up in 3 weeks for postpartum visit                        Cosigned by: Rohini Dao MD at 2022 11:25 PM       Revision History

## 2022-09-13 PROCEDURE — 88307 TISSUE EXAM BY PATHOLOGIST: CPT | Performed by: PATHOLOGY

## 2022-09-19 LAB
DME PARACHUTE DELIVERY DATE ACTUAL: NORMAL
DME PARACHUTE DELIVERY DATE REQUESTED: NORMAL
DME PARACHUTE ITEM DESCRIPTION: NORMAL
DME PARACHUTE ORDER STATUS: NORMAL
DME PARACHUTE SUPPLIER NAME: NORMAL
DME PARACHUTE SUPPLIER PHONE: NORMAL

## 2022-09-22 PROBLEM — B37.31 YEAST VAGINITIS: Status: RESOLVED | Noted: 2022-08-15 | Resolved: 2022-09-22

## 2022-09-22 PROBLEM — O98.512 COVID-19 AFFECTING PREGNANCY IN SECOND TRIMESTER: Status: RESOLVED | Noted: 2022-06-16 | Resolved: 2022-09-22

## 2022-09-22 PROBLEM — U07.1 COVID-19 AFFECTING PREGNANCY IN SECOND TRIMESTER: Status: RESOLVED | Noted: 2022-06-16 | Resolved: 2022-09-22

## 2022-09-22 PROBLEM — O60.03 PRETERM LABOR IN THIRD TRIMESTER: Status: RESOLVED | Noted: 2022-09-07 | Resolved: 2022-09-22

## 2022-09-22 PROBLEM — O99.013 ANEMIA AFFECTING PREGNANCY IN THIRD TRIMESTER: Status: RESOLVED | Noted: 2022-07-28 | Resolved: 2022-09-22

## 2022-09-22 PROBLEM — O35.EXX0 FETAL RENAL ANOMALY, SINGLE GESTATION: Status: RESOLVED | Noted: 2022-05-31 | Resolved: 2022-09-22

## 2022-09-22 PROBLEM — O35.8XX0 FETAL RENAL ANOMALY, SINGLE GESTATION: Status: RESOLVED | Noted: 2022-05-31 | Resolved: 2022-09-22

## 2022-09-22 PROBLEM — O24.410 GDM (GESTATIONAL DIABETES MELLITUS), CLASS A1: Status: RESOLVED | Noted: 2022-08-10 | Resolved: 2022-09-22

## 2022-09-22 PROBLEM — O99.820 GBS (GROUP B STREPTOCOCCUS CARRIER), +RV CULTURE, CURRENTLY PREGNANT: Status: RESOLVED | Noted: 2022-03-23 | Resolved: 2022-09-22

## 2022-09-22 PROBLEM — B37.3 YEAST VAGINITIS: Status: RESOLVED | Noted: 2022-08-15 | Resolved: 2022-09-22

## 2022-09-22 PROBLEM — O14.90 PREECLAMPSIA: Status: RESOLVED | Noted: 2022-08-29 | Resolved: 2022-09-22

## 2022-09-22 PROBLEM — Z87.59 HISTORY OF SEVERE PRE-ECLAMPSIA: Status: RESOLVED | Noted: 2022-03-03 | Resolved: 2022-09-22

## 2022-09-22 PROBLEM — Z34.93 PRENATAL CARE IN THIRD TRIMESTER: Status: RESOLVED | Noted: 2022-09-01 | Resolved: 2022-09-22

## 2022-09-22 NOTE — PROGRESS NOTES
POSTPARTUM VISIT    Brisa Staton presents today for postpartum visit  She had a vaginal  delivery on 22  Complications included  labor and pre-eclampsia  She is breast feeding her infant and reports no issues with such  She desires condoms for contraception  She was provided with Cm Ha Depression Screening tool and her score was 0  Baby "Desmond Stroud"    Review of Systems:   -Constitutional: denies issues, denies pain   -Breasts: denies tenderness   -Gynecologic: lochia almost gone   -Urinary: denies issues urinating   -GI: stools WNL, denies issues    Physical Exam:   -Vitals:   Vitals:    22 1501   BP: 133/88   Pulse: 96   Weight: 68 4 kg (150 lb 12 8 oz)   Height: 5' (1 524 m)      -General: A&Ox3, no acute distress noted   -Abdomen: soft, non-tender, incision appears clean/dry/intact and well-healed   -Extremities: nontender, no edema noted     Assessment/Plan:  1  Normal postpartum exam   2  Depression screening negative    Last pap smear was done today and result was abnormal   Advise return for next annual GYN exam in 3-4 wks, needs pap  3  Contraception: condoms  4  Right carpal tunnel syndrome  Will obtain wrist splint  To follow up with PCP

## 2022-09-28 ENCOUNTER — POSTPARTUM VISIT (OUTPATIENT)
Dept: OBGYN CLINIC | Facility: CLINIC | Age: 32
End: 2022-09-28

## 2022-09-28 VITALS
SYSTOLIC BLOOD PRESSURE: 133 MMHG | HEIGHT: 60 IN | HEART RATE: 96 BPM | DIASTOLIC BLOOD PRESSURE: 88 MMHG | BODY MASS INDEX: 29.61 KG/M2 | WEIGHT: 150.8 LBS

## 2022-09-28 DIAGNOSIS — G56.01 CARPAL TUNNEL SYNDROME OF RIGHT WRIST: ICD-10-CM

## 2022-09-28 PROCEDURE — 99213 OFFICE O/P EST LOW 20 MIN: CPT | Performed by: OBSTETRICS & GYNECOLOGY

## 2022-09-29 ENCOUNTER — PATIENT OUTREACH (OUTPATIENT)
Dept: OBGYN CLINIC | Facility: CLINIC | Age: 32
End: 2022-09-29

## 2022-09-29 NOTE — PROGRESS NOTES
Pt had a PP visit yesterday, edinburgh score was a 0, no needs expressed, ZEYAD CM will close this referral at this time, please re-refer as needed

## 2022-10-26 ENCOUNTER — ANNUAL EXAM (OUTPATIENT)
Dept: OBGYN CLINIC | Facility: CLINIC | Age: 32
End: 2022-10-26

## 2022-10-26 VITALS
DIASTOLIC BLOOD PRESSURE: 82 MMHG | HEART RATE: 72 BPM | WEIGHT: 150 LBS | HEIGHT: 60 IN | BODY MASS INDEX: 29.45 KG/M2 | SYSTOLIC BLOOD PRESSURE: 122 MMHG

## 2022-10-26 DIAGNOSIS — Z12.4 CERVICAL CANCER SCREENING: ICD-10-CM

## 2022-10-26 DIAGNOSIS — Z01.419 ENCOUNTER FOR GYNECOLOGICAL EXAMINATION (GENERAL) (ROUTINE) WITHOUT ABNORMAL FINDINGS: Primary | ICD-10-CM

## 2022-10-26 DIAGNOSIS — G56.01 CARPAL TUNNEL SYNDROME OF RIGHT WRIST: ICD-10-CM

## 2022-10-26 PROCEDURE — G0476 HPV COMBO ASSAY CA SCREEN: HCPCS | Performed by: OBSTETRICS & GYNECOLOGY

## 2022-10-26 PROCEDURE — 99395 PREV VISIT EST AGE 18-39: CPT | Performed by: OBSTETRICS & GYNECOLOGY

## 2022-10-26 NOTE — PROGRESS NOTES
Assessment     Shola Eller is a 33 y o  [id number] with normal gynecologic exam      Plan   Patient to return to office in 12 months for annual well woman exam      30-39  1  Routine well woman exam done today  2   Pap and HPV:Pap with HPV was done today  Current ASCCP Guidelines reviewed  3   The patient refused STD testing  Safe sex practices have been discussed  4  The patient is sexually active  She uses condoms for contraception and options have been discussed  5  The following were reviewed in today's visit: breast self exam, STD testing, HIV risk factors and prevention, exercise and healthy diet  6  Patient to return to office in 12 months for annual well woman exam      Tigre Vazquez is a 33 y o  female who presents for annual well woman exam  Periods are regular every 28-30 days  GYN:  · No vaginal discharge, labial erythema or lesions, dyspareunia  · Contraception: Condoms  · Patient is sexually active with one partner  · Gynecologic surgery: None    OB:  · [id number] female  Most recent pregnancy on 3/2/6931 were complicated by  labor and pre-eclampsia  :  · No dysuria, urinary frequency or urgency  · No hematuria, flank pain, incontinence  Breast:  · No breast mass, skin changes, dimpling, reddening, nipple retraction  · No breast discharge  Patient currently breastfeeding        General:  · ETOH use: None  · Tobacco use: None  · Recreational drug use: None    Screening:  · Cervical cancer: last pap smear in 10/2020  Results showed atypical squamous cells of undetermined significant  · STD screening: RPR negative (2022), GC/gonorrhea negative (3/2022)    Review of Systems  Pertinent items are noted in HPI  Objective      /82   Pulse 72   Ht 5' (1 524 m)   Wt 68 kg (150 lb)   LMP  (LMP Unknown)   BMI 29 29 kg/m²     Physical Exam  Vitals reviewed  Constitutional:       General: She is not in acute distress  Appearance: She is well-developed  She is not diaphoretic  HENT:      Head: Normocephalic and atraumatic  Eyes:      Conjunctiva/sclera: Conjunctivae normal    Cardiovascular:      Rate and Rhythm: Normal rate and regular rhythm  Heart sounds: Normal heart sounds  No murmur heard  No friction rub  No gallop  Pulmonary:      Effort: Pulmonary effort is normal  No respiratory distress  Breath sounds: Normal breath sounds  No wheezing or rales  Abdominal:      General: Bowel sounds are normal  There is no distension  Palpations: Abdomen is soft  There is no mass  Tenderness: There is no abdominal tenderness  There is no guarding  Genitourinary:     General: Normal vulva  Exam position: Lithotomy position  Pubic Area: No rash or pubic lice  Daniel stage (genital): 5  Labia:         Right: No rash, tenderness, lesion or injury  Left: No rash, tenderness, lesion or injury  Vagina: No signs of injury and foreign body  No vaginal discharge, tenderness, bleeding or lesions  Cervix: No cervical motion tenderness, discharge or cervical bleeding  Adnexa:         Right: No tenderness  Left: No tenderness  Musculoskeletal:         General: No tenderness or deformity  Normal range of motion  Cervical back: Normal range of motion  Right lower leg: No edema  Left lower leg: No edema  Skin:     General: Skin is warm and dry  Neurological:      General: No focal deficit present  Mental Status: She is alert and oriented to person, place, and time               Bo Sánchez MD  10/26/2022

## 2022-10-27 ENCOUNTER — TELEPHONE (OUTPATIENT)
Dept: OBGYN CLINIC | Facility: OTHER | Age: 32
End: 2022-10-27

## 2022-10-27 LAB
HPV HR 12 DNA CVX QL NAA+PROBE: NEGATIVE
HPV16 DNA CVX QL NAA+PROBE: NEGATIVE
HPV18 DNA CVX QL NAA+PROBE: NEGATIVE

## 2022-10-27 NOTE — TELEPHONE ENCOUNTER
Patient is being referred to a hand specialist  Please schedule accordingly      [address]   [phone]

## 2022-11-04 LAB
LAB AP GYN PRIMARY INTERPRETATION: NORMAL
Lab: NORMAL

## 2022-12-01 ENCOUNTER — OFFICE VISIT (OUTPATIENT)
Dept: OBGYN CLINIC | Facility: MEDICAL CENTER | Age: 32
End: 2022-12-01

## 2022-12-01 VITALS
SYSTOLIC BLOOD PRESSURE: 115 MMHG | HEIGHT: 60 IN | WEIGHT: 150 LBS | DIASTOLIC BLOOD PRESSURE: 76 MMHG | HEART RATE: 84 BPM | BODY MASS INDEX: 29.45 KG/M2

## 2022-12-01 DIAGNOSIS — G56.01 CARPAL TUNNEL SYNDROME OF RIGHT WRIST: ICD-10-CM

## 2022-12-01 NOTE — PROGRESS NOTES
Assessment/Plan:    Diagnoses and all orders for this visit:    Carpal tunnel syndrome of right wrist  -     [facility]; Future  -     Ambulatory Referral to Orthopedic Surgery; Future        Return if symptoms worsen or fail to improve  Chief Complaint:     Chief Complaint   Patient presents with   • Right Hand - Numbness, Pain       Subjective:   Patient ID: Danny Waggoner is a 33 y o  female  New patient presents for approximately 3 months of right hand numbness and tingling  She did experience this with her 1st pregnancy however symptoms resolved however during her 2nd pregnancy her symptoms reappeared  She has tried night splinting with no benefit in the past   Denies any neck or elbow issues      Review of Systems    The following portions of the patient's chart were reviewed and updated as appropriate: Allergy:    Allergies   Allergen Reactions   • Penicillins Rash         Past Medical History:   Diagnosis Date   • Abnormal Pap smear of cervix    • Environmental allergies    • Fetal renal anomaly, single gestation 2022   • Genital herpes    • Seasonal allergies    • Severe pre-eclampsia in third trimester 2021   •  (spontaneous vaginal delivery) 2021   • Varicella        Past Surgical History:   Procedure Laterality Date   • NO PAST SURGERIES         Social History     Socioeconomic History   • Marital status: Single     Spouse name: Not on file   • Number of children: Not on file   • Years of education: Not on file   • Highest education level:  Bachelor's degree (e g , BA, AB, BS)   Occupational History   • Occupation: Barista     Employer: [employer]      Comment: Works 12-15hrs per week / [employer] Worked (5:30-10pm)   Tobacco Use   • Smoking status: Never   • Smokeless tobacco: Never   Vaping Use   • Vaping Use: Never used   Substance and Sexual Activity   • Alcohol use: Not Currently     Comment: social/prior to pregnancy • Drug use: Never   • Sexual activity: Not Currently     Partners: Male   Other Topics Concern   • Not on file   Social History Narrative   • Not on file     Social Determinants of Health     Financial Resource Strain: Low Risk    • Difficulty of Paying Living Expenses: Not hard at all   Food Insecurity: No Food Insecurity   • Worried About [address] in the Last Year: Never true   • Ran Out of Food in the Last Year: Never true   Transportation Needs: No Transportation Needs   • Lack of Transportation (Medical): No   • Lack of Transportation (Non-Medical): No   Physical Activity: Not on file   Stress: Not on file   Social Connections: Not on file   Intimate Partner Violence: Not on file   Housing Stability: Not on file       Family History   Problem Relation Age of Onset   • Breast cancer Mother 46   • No Known Problems Father    • No Known Problems Maternal Grandmother    • No Known Problems Paternal Grandmother    • Heart attack Paternal Grandfather    • No Known Problems Sister    • No Known Problems Brother    • No Known Problems Son    • Colon cancer Neg Hx    • Ovarian cancer Neg Hx        Medications:    Current Outpatient Medications:   •  Prenatal MV-Min-Fe Fum-FA-DHA (PRENATAL 1 PO), Take by mouth, Disp: , Rfl:   •  acetaminophen (TYLENOL) 325 mg tablet, Take 2 tablets (650 mg total) by mouth every 6 (six) hours as needed for mild pain (Patient not taking: Reported on 9/28/2022), Disp: , Rfl: 0  •  benzocaine-menthol-lanolin-aloe (DERMOPLAST) 20-0 5 % topical spray, Apply 1 application topically every 6 (six) hours as needed for irritation (Patient not taking: Reported on 9/28/2022), Disp: , Rfl: 0  •  Blood Glucose Monitoring Suppl (OneTouch Verio Flex System) w/Device KIT, Test x4 Daily or as instructed (Patient not taking: Reported on 9/28/2022), Disp: 1 kit, Rfl: 0  •  ferrous sulfate 324 (65 Fe) mg, TAKE 1 TABLET BY MOUTH 2 TIMES A DAY BEFORE MEALS   (Patient not taking: Reported on 2022), Disp: 60 tablet, Rfl: 0  •  ibuprofen (MOTRIN) 600 mg tablet, Take 1 tablet (600 mg total) by mouth every 6 (six) hours as needed for mild pain (Patient not taking: Reported on 2022), Disp: 30 tablet, Rfl: 0  •  valACYclovir (VALTREX) 500 mg tablet, Take 1 tablet (500 mg total) by mouth 2 (two) times a day (Patient not taking: No sig reported), Disp: 60 tablet, Rfl: 1  •  witch hazel-glycerin (TUCKS) topical pad, Apply 1 pad topically every 4 (four) hours as needed for irritation (Patient not taking: Reported on 2022), Disp: , Rfl: 0    Patient Active Problem List   Diagnosis   • Atypical squamous cell changes of undetermined significance (ASCUS) on cervical cytology with positive high risk human papilloma virus (HPV)   • HSV (herpes simplex virus) infection   •  (spontaneous vaginal delivery)       Objective:  /76   Pulse 84   Ht 5' (1 524 m)   Wt 68 kg (150 lb)   BMI 29 29 kg/m²     Right Hand Exam     Tests   Tinel's sign (median nerve): negative    Other   Sensation: normal  Pulse: present      Left Hand Exam     Other   Sensation: normal  Pulse: present          Tests     Right Wrist/Hand   Negative Tinel's sign (medial nerve)  Physical Exam      Neurologic Exam    Procedures    There are no pertinent studies obtained with regards to today's office visit

## 2023-01-18 ENCOUNTER — HOSPITAL ENCOUNTER (OUTPATIENT)
Dept: ULTRASOUND IMAGING | Facility: HOSPITAL | Age: 33
Discharge: HOME | End: 2023-01-18
Attending: EMERGENCY MEDICINE

## 2023-01-18 DIAGNOSIS — G56.01 CARPAL TUNNEL SYNDROME OF RIGHT WRIST: ICD-10-CM

## 2023-01-23 ENCOUNTER — OFFICE VISIT (OUTPATIENT)
Dept: OBGYN CLINIC | Facility: MEDICAL CENTER | Age: 33
End: 2023-01-23

## 2023-01-23 VITALS
WEIGHT: 150 LBS | BODY MASS INDEX: 29.45 KG/M2 | HEART RATE: 99 BPM | DIASTOLIC BLOOD PRESSURE: 83 MMHG | SYSTOLIC BLOOD PRESSURE: 130 MMHG | HEIGHT: 60 IN

## 2023-01-23 DIAGNOSIS — G56.01 CARPAL TUNNEL SYNDROME OF RIGHT WRIST: ICD-10-CM

## 2023-01-23 NOTE — PROGRESS NOTES
ORTHOPAEDIC HAND, WRIST, AND ELBOW OFFICE  VISIT       ASSESSMENT/PLAN:      The patient verbalized understanding of exam findings and treatment plan  We engaged in the shared decision-making process and treatment options were discussed at length with the patient  Surgical and conservative management discussed today along with risks and benefits  The patient would like to proceed withy elective R carpal tunnel release  Consent was signed today in office  Sedation    Diagnoses and all orders for this visit:    Carpal tunnel syndrome of right wrist  -     Ambulatory Referral to Orthopedic Surgery    Follow Up:  Return for surgery  To Do Next Visit:  Re-evaluation of current issue    General Discussions:  Carpal Tunnel Syndrome: The anatomy and physiology of carpal tunnel syndrome was discussed with the patient today  Increase pressure localized under the transverse carpal ligament can cause pain, numbness, tingling, or dysesthesias within the median nerve distribution as well as feelings of fatigue, clumsiness, or awkwardness  These symptoms typically occur at night and worse in the morning upon waking  Eventually, untreated carpal tunnel syndrome can result in weakness and permanent loss of muscle within the thenar compartment of the hand  Treatment options were discussed with the patient  Conservative treatment includes nocturnal resting splints to keep the nerve in a neutral position, ergonomic changes within the work or home environment, activity modification, and tendon gliding exercises  Vitamin B6 one tablet daily over the counter may helpful to reduce symptoms  Steroid injections within the carpal canal can help a majority of patients, however this is often self-limited in a majority of patients  Surgical intervention to divide the transverse carpal ligament typically results in a long-lasting relief of the patient's complaints, with the recurrence rate of less than 1%  Operative Discussions:  Open Carpal Tunnel Release: The anatomy and physiology of carpal tunnel syndrome was discussed with the patient today  Increase pressure localized under the transverse carpal ligament can cause pain, numbness, tingling, or dysesthesias within the median nerve distribution as well as feelings of fatigue, clumsiness, or awkwardness  These symptoms typically occur at night and worse in the morning upon waking  Eventually, untreated carpal tunnel syndrome can result in weakness and permanent loss of muscle within the thenar compartment of the hand  Treatment options were discussed with the patient  Conservative treatment includes nocturnal resting splints to keep the nerve in a neutral position, ergonomic changes within the work or home environment, activity modification, and tendon gliding exercises  Vitamin B6 one tablet daily over the counter may helpful to reduce symptoms  Steroid injections within the carpal canal can help a majority of patients, however this is often self-limited in a majority of patients  Surgical intervention to divide the transverse carpal ligament typically results in a long-lasting relief of the patient's complaints, with the recurrence rate of less than 1%  The patient has elected to undergo an open carpal tunnel release  The palmar incision technique was discussed in the office with the patient today  In the postoperative period, light activities are allowed immediately, driving is allowed when narcotic medication has stopped, and the bandages may be removed and incision may get wet after 2 days  Heavy activities (lifting more than approximately 10 pounds) will be allowed after follow up appointment in 1-2 weeks    While night symptoms (waking from sleep, pain, and discomfort in the hands) generally improves rapidly, the numbness and tingling as well as the strength will slowly improve over weeks to months depending on the chronicity and severity of the carpal tunnel syndrome  Pillar pain and scar discomfort were discussed with the patient which are self-limiting conditions  The risks of bleeding and infection from the surgery are less than 1%  Risk of recurrence is approximately 0 5%  The risks of nerve injury or nerve damage or damage to the blood vessels is approximately 1 in 1200  The patient has an understanding of the above mentioned discussion  The risks and benefits of the procedure were explained to the patient, which include, but are not limited to: Bleeding, infection, recurrence, pain, scar, damage to tendons, damage to nerves, and damage to blood vessels, failure to give desired results and complications related to anesthesia  These risks, along with alternative conservative treatment options, and postoperative protocols were voiced back and understood by the patient  All questions were answered to the patient's satisfaction  The patient agrees to comply with a standard postoperative protocol, and is willing to proceed  Education was provided via written and auditory forms  There were no barriers to learning  Written handouts regarding wound care, incision and scar care, and general preoperative information was provided to the patient  Prior to surgery, the patient may be requested to stop all anti-inflammatory medications  Prophylactic aspirin, Plavix, and Coumadin may be allowed to be continued  Medications including vitamin E , ginkgo, and fish oil are requested to be stopped approximately one week prior to surgery    Hypertensive medications and beta blockers, if taken, s  ____________________________________________________________________________________________________________________________________________    CHIEF COMPLAINT:  Chief Complaint   Patient presents with   • Right Wrist - Pain, Numbness       SUBJECTIVE:  Dimitris Vazquez is a 33y o  year old RHD female who presents today for initial evaluation of her right hand due to numbness and tingling  The patient was last seen by Dr Franko Ramirez on 2022 at which time she was referred to a hand specialist to review [address] [facility] limited results and possibe treatment options  On today's presentation, the patient states that she experienced worsening numbness and tingling after her 1st pregnancy but her symptoms resolved  Her symptoms have reappeared with her 2nd pregnancy  Night splints provides no relief  She states that it affects her ADLs  Denies weakness in her right hand      Prior treatment   · NSAIDsYes   · Injections No   · Bracing/Orthotics Yes    Physical Therapy No     I have personally reviewed all the relevant PMH, PSH, SH, FH, Medications and allergies    PAST MEDICAL HISTORY:  Past Medical History:   Diagnosis Date   • Abnormal Pap smear of cervix    • Environmental allergies    • Fetal renal anomaly, single gestation 2022   • Genital herpes    • Seasonal allergies    • Severe pre-eclampsia in third trimester 2021   •  (spontaneous vaginal delivery) 2021   • Varicella        PAST SURGICAL HISTORY:  Past Surgical History:   Procedure Laterality Date   • NO PAST SURGERIES         FAMILY HISTORY:  Family History   Problem Relation Age of Onset   • Breast cancer Mother 46   • No Known Problems Father    • No Known Problems Maternal Grandmother    • No Known Problems Paternal Grandmother    • Heart attack Paternal Grandfather    • No Known Problems Sister    • No Known Problems Brother    • No Known Problems Son    • Colon cancer Neg Hx    • Ovarian cancer Neg Hx        SOCIAL HISTORY:  Social History     Tobacco Use   • Smoking status: Never   • Smokeless tobacco: Never   Vaping Use   • Vaping Use: Never used   Substance Use Topics   • Alcohol use: Not Currently     Comment: social/prior to pregnancy   • Drug use: Never       MEDICATIONS:    Current Outpatient Medications:   •  acetaminophen (TYLENOL) 325 mg tablet, Take 2 tablets (650 mg total) by mouth every 6 (six) hours as needed for mild pain (Patient not taking: Reported on 9/28/2022), Disp: , Rfl: 0  •  benzocaine-menthol-lanolin-aloe (DERMOPLAST) 20-0 5 % topical spray, Apply 1 application topically every 6 (six) hours as needed for irritation (Patient not taking: Reported on 9/28/2022), Disp: , Rfl: 0  •  Blood Glucose Monitoring Suppl (OneTouch Verio Flex System) w/Device KIT, Test x4 Daily or as instructed (Patient not taking: Reported on 9/28/2022), Disp: 1 kit, Rfl: 0  •  ferrous sulfate 324 (65 Fe) mg, TAKE 1 TABLET BY MOUTH 2 TIMES A DAY BEFORE MEALS  (Patient not taking: Reported on 12/1/2022), Disp: 60 tablet, Rfl: 0  •  ibuprofen (MOTRIN) 600 mg tablet, Take 1 tablet (600 mg total) by mouth every 6 (six) hours as needed for mild pain (Patient not taking: Reported on 9/28/2022), Disp: 30 tablet, Rfl: 0  •  Prenatal MV-Min-Fe Fum-FA-DHA (PRENATAL 1 PO), Take by mouth, Disp: , Rfl:   •  valACYclovir (VALTREX) 500 mg tablet, Take 1 tablet (500 mg total) by mouth 2 (two) times a day (Patient not taking: No sig reported), Disp: 60 tablet, Rfl: 1  •  witch hazel-glycerin (TUCKS) topical pad, Apply 1 pad topically every 4 (four) hours as needed for irritation (Patient not taking: Reported on 9/28/2022), Disp: , Rfl: 0    ALLERGIES:  Allergies   Allergen Reactions   • Penicillins Rash     REVIEW OF SYSTEMS:  Review of Systems   Constitutional: Negative for chills and fever  HENT: Negative for ear pain and sore throat  Eyes: Negative for pain and visual disturbance  Respiratory: Negative for cough and shortness of breath  Cardiovascular: Negative for chest pain and palpitations  Gastrointestinal: Negative for abdominal pain and vomiting  Genitourinary: Negative for dysuria and hematuria  Musculoskeletal: Negative for arthralgias and back pain     Skin: Negative for color change and rash  Neurological: Negative for seizures and syncope  All other systems reviewed and are negative  VITALS:  Vitals:    01/23/23 1519   BP: 130/83   Pulse: 99       LABS:  HgA1c: No results found for: HGBA1C  BMP:   Lab Results   Component Value Date    GLUCOSE 115 01/15/2016    CALCIUM 8 6 09/07/2022    K 4 0 09/07/2022    CO2 20 (L) 09/07/2022     09/07/2022    BUN 17 09/07/2022    CREATININE 0 68 09/07/2022     _____________________________________________________  PHYSICAL EXAMINATION:  General: well developed and well nourished, alert, oriented times 3 and appears comfortable  Psychiatric: Normal  HEENT: Normocephalic, Atraumatic Trachea Midline, No torticollis  Pulmonary: No audible wheezing or respiratory distress   Abdomen/GI: Non tender, non distended   Cardiovascular: No pitting edema, 2+ radial pulse   Skin: No masses, erythema, lacerations, fluctation, ulcerations  Neurovascular: Sensation Intact to the Median, Ulnar, Radial Nerve, Motor Intact to the Median, Ulnar, Radial Nerve and Pulses Intact  Musculoskeletal: Normal, except as noted in detailed exam and in HPI        MUSCULOSKELETAL EXAMINATION:  right wrist -  Patient presents with no obvious anatomical deformity  Skin is warm and dry to touch with no signs of erythema, ecchymosis, infection  MMT: 5/5 throughout  No soft tissue swelling or effusion noted  Full FDS, FDP, extensor mechanisms are intact  - Thenar atrophy, - intrinsic atrophy  + Tinel's at carpal tunnel  - Phalen's sign  + Carpal Tunnel Compression  - Finklestein  Demonstrates normal wrist, elbow, and shoulder motion  Forearm compartments are soft and supple  2+ Distal radial pulse with brisk capillary refill to the fingers  Radial, median, ulnar motor and sensory distribution intact  Sensation to light touch intact distally    2 pt discrimination: Thumb- R 7 mm, IF- R 6 mm, MF- R 5 mm, RF- R 5 mm, SF- R 5 mm  _______________________________________________  STUDIES REVIEWED:  I have personally reviewed AP lateral and oblique radiographs of [facility] limited which demonstrate R Carpal tunnel syndrome ruled in based on marked abnormal CSA > 14 sq mm      PROCEDURES PERFORMED:  Procedures  No Procedures performed today    _____________________________________________________      [facility]    I,:  Birgit Ham am acting as a scribe while in the presence of the attending physician :       I,:  Antonio Huff MD personally performed the services described in this documentation    as scribed in my presence :

## 2023-01-23 NOTE — H&P (VIEW-ONLY)
ORTHOPAEDIC HAND, WRIST, AND ELBOW OFFICE  VISIT       ASSESSMENT/PLAN:      The patient verbalized understanding of exam findings and treatment plan  We engaged in the shared decision-making process and treatment options were discussed at length with the patient  Surgical and conservative management discussed today along with risks and benefits  The patient would like to proceed withy elective R carpal tunnel release  Consent was signed today in office  Sedation    Diagnoses and all orders for this visit:    Carpal tunnel syndrome of right wrist  -     Ambulatory Referral to Orthopedic Surgery    Follow Up:  Return for surgery  To Do Next Visit:  Re-evaluation of current issue    General Discussions:  Carpal Tunnel Syndrome: The anatomy and physiology of carpal tunnel syndrome was discussed with the patient today  Increase pressure localized under the transverse carpal ligament can cause pain, numbness, tingling, or dysesthesias within the median nerve distribution as well as feelings of fatigue, clumsiness, or awkwardness  These symptoms typically occur at night and worse in the morning upon waking  Eventually, untreated carpal tunnel syndrome can result in weakness and permanent loss of muscle within the thenar compartment of the hand  Treatment options were discussed with the patient  Conservative treatment includes nocturnal resting splints to keep the nerve in a neutral position, ergonomic changes within the work or home environment, activity modification, and tendon gliding exercises  Vitamin B6 one tablet daily over the counter may helpful to reduce symptoms  Steroid injections within the carpal canal can help a majority of patients, however this is often self-limited in a majority of patients  Surgical intervention to divide the transverse carpal ligament typically results in a long-lasting relief of the patient's complaints, with the recurrence rate of less than 1%  Operative Discussions:  Open Carpal Tunnel Release: The anatomy and physiology of carpal tunnel syndrome was discussed with the patient today  Increase pressure localized under the transverse carpal ligament can cause pain, numbness, tingling, or dysesthesias within the median nerve distribution as well as feelings of fatigue, clumsiness, or awkwardness  These symptoms typically occur at night and worse in the morning upon waking  Eventually, untreated carpal tunnel syndrome can result in weakness and permanent loss of muscle within the thenar compartment of the hand  Treatment options were discussed with the patient  Conservative treatment includes nocturnal resting splints to keep the nerve in a neutral position, ergonomic changes within the work or home environment, activity modification, and tendon gliding exercises  Vitamin B6 one tablet daily over the counter may helpful to reduce symptoms  Steroid injections within the carpal canal can help a majority of patients, however this is often self-limited in a majority of patients  Surgical intervention to divide the transverse carpal ligament typically results in a long-lasting relief of the patient's complaints, with the recurrence rate of less than 1%  The patient has elected to undergo an open carpal tunnel release  The palmar incision technique was discussed in the office with the patient today  In the postoperative period, light activities are allowed immediately, driving is allowed when narcotic medication has stopped, and the bandages may be removed and incision may get wet after 2 days  Heavy activities (lifting more than approximately 10 pounds) will be allowed after follow up appointment in 1-2 weeks    While night symptoms (waking from sleep, pain, and discomfort in the hands) generally improves rapidly, the numbness and tingling as well as the strength will slowly improve over weeks to months depending on the chronicity and severity of the carpal tunnel syndrome  Pillar pain and scar discomfort were discussed with the patient which are self-limiting conditions  The risks of bleeding and infection from the surgery are less than 1%  Risk of recurrence is approximately 0 5%  The risks of nerve injury or nerve damage or damage to the blood vessels is approximately 1 in 1200  The patient has an understanding of the above mentioned discussion  The risks and benefits of the procedure were explained to the patient, which include, but are not limited to: Bleeding, infection, recurrence, pain, scar, damage to tendons, damage to nerves, and damage to blood vessels, failure to give desired results and complications related to anesthesia  These risks, along with alternative conservative treatment options, and postoperative protocols were voiced back and understood by the patient  All questions were answered to the patient's satisfaction  The patient agrees to comply with a standard postoperative protocol, and is willing to proceed  Education was provided via written and auditory forms  There were no barriers to learning  Written handouts regarding wound care, incision and scar care, and general preoperative information was provided to the patient  Prior to surgery, the patient may be requested to stop all anti-inflammatory medications  Prophylactic aspirin, Plavix, and Coumadin may be allowed to be continued  Medications including vitamin E , ginkgo, and fish oil are requested to be stopped approximately one week prior to surgery    Hypertensive medications and beta blockers, if taken, s  ____________________________________________________________________________________________________________________________________________    CHIEF COMPLAINT:  Chief Complaint   Patient presents with   • Right Wrist - Pain, Numbness       SUBJECTIVE:  Bello Macias is a 33y o  year old RHD female who presents today for initial evaluation of her right hand due to numbness and tingling  The patient was last seen by Dr Gypsy Del Valle on 2022 at which time she was referred to a hand specialist to review [address] MSK limited results and possibe treatment options  On today's presentation, the patient states that she experienced worsening numbness and tingling after her 1st pregnancy but her symptoms resolved  Her symptoms have reappeared with her 2nd pregnancy  Night splints provides no relief  She states that it affects her ADLs  Denies weakness in her right hand      Prior treatment   · NSAIDsYes   · Injections No   · Bracing/Orthotics Yes    Physical Therapy No     I have personally reviewed all the relevant PMH, PSH, SH, FH, Medications and allergies    PAST MEDICAL HISTORY:  Past Medical History:   Diagnosis Date   • Abnormal Pap smear of cervix    • Environmental allergies    • Fetal renal anomaly, single gestation 2022   • Genital herpes    • Seasonal allergies    • Severe pre-eclampsia in third trimester 2021   •  (spontaneous vaginal delivery) 2021   • Varicella        PAST SURGICAL HISTORY:  Past Surgical History:   Procedure Laterality Date   • NO PAST SURGERIES         FAMILY HISTORY:  Family History   Problem Relation Age of Onset   • Breast cancer Mother 46   • No Known Problems Father    • No Known Problems Maternal Grandmother    • No Known Problems Paternal Grandmother    • Heart attack Paternal Grandfather    • No Known Problems Sister    • No Known Problems Brother    • No Known Problems Son    • Colon cancer Neg Hx    • Ovarian cancer Neg Hx        SOCIAL HISTORY:  Social History     Tobacco Use   • Smoking status: Never   • Smokeless tobacco: Never   Vaping Use   • Vaping Use: Never used   Substance Use Topics   • Alcohol use: Not Currently     Comment: social/prior to pregnancy   • Drug use: Never       MEDICATIONS:    Current Outpatient Medications:   •  acetaminophen (TYLENOL) 325 mg tablet, Take 2 tablets (650 mg total) by mouth every 6 (six) hours as needed for mild pain (Patient not taking: Reported on 9/28/2022), Disp: , Rfl: 0  •  benzocaine-menthol-lanolin-aloe (DERMOPLAST) 20-0 5 % topical spray, Apply 1 application topically every 6 (six) hours as needed for irritation (Patient not taking: Reported on 9/28/2022), Disp: , Rfl: 0  •  Blood Glucose Monitoring Suppl (OneTouch Verio Flex System) w/Device KIT, Test x4 Daily or as instructed (Patient not taking: Reported on 9/28/2022), Disp: 1 kit, Rfl: 0  •  ferrous sulfate 324 (65 Fe) mg, TAKE 1 TABLET BY MOUTH 2 TIMES A DAY BEFORE MEALS  (Patient not taking: Reported on 12/1/2022), Disp: 60 tablet, Rfl: 0  •  ibuprofen (MOTRIN) 600 mg tablet, Take 1 tablet (600 mg total) by mouth every 6 (six) hours as needed for mild pain (Patient not taking: Reported on 9/28/2022), Disp: 30 tablet, Rfl: 0  •  Prenatal MV-Min-Fe Fum-FA-DHA (PRENATAL 1 PO), Take by mouth, Disp: , Rfl:   •  valACYclovir (VALTREX) 500 mg tablet, Take 1 tablet (500 mg total) by mouth 2 (two) times a day (Patient not taking: No sig reported), Disp: 60 tablet, Rfl: 1  •  witch hazel-glycerin (TUCKS) topical pad, Apply 1 pad topically every 4 (four) hours as needed for irritation (Patient not taking: Reported on 9/28/2022), Disp: , Rfl: 0    ALLERGIES:  Allergies   Allergen Reactions   • Penicillins Rash     REVIEW OF SYSTEMS:  Review of Systems   Constitutional: Negative for chills and fever  HENT: Negative for ear pain and sore throat  Eyes: Negative for pain and visual disturbance  Respiratory: Negative for cough and shortness of breath  Cardiovascular: Negative for chest pain and palpitations  Gastrointestinal: Negative for abdominal pain and vomiting  Genitourinary: Negative for dysuria and hematuria  Musculoskeletal: Negative for arthralgias and back pain     Skin: Negative for color change and rash  Neurological: Negative for seizures and syncope  All other systems reviewed and are negative  VITALS:  Vitals:    01/23/23 1519   BP: 130/83   Pulse: 99       LABS:  HgA1c: No results found for: HGBA1C  BMP:   Lab Results   Component Value Date    GLUCOSE 115 01/15/2016    CALCIUM 8 6 09/07/2022    K 4 0 09/07/2022    CO2 20 (L) 09/07/2022     09/07/2022    BUN 17 09/07/2022    CREATININE 0 68 09/07/2022     _____________________________________________________  PHYSICAL EXAMINATION:  General: well developed and well nourished, alert, oriented times 3 and appears comfortable  Psychiatric: Normal  HEENT: Normocephalic, Atraumatic Trachea Midline, No torticollis  Pulmonary: No audible wheezing or respiratory distress   Abdomen/GI: Non tender, non distended   Cardiovascular: No pitting edema, 2+ radial pulse   Skin: No masses, erythema, lacerations, fluctation, ulcerations  Neurovascular: Sensation Intact to the Median, Ulnar, Radial Nerve, Motor Intact to the Median, Ulnar, Radial Nerve and Pulses Intact  Musculoskeletal: Normal, except as noted in detailed exam and in HPI        MUSCULOSKELETAL EXAMINATION:  right wrist -  Patient presents with no obvious anatomical deformity  Skin is warm and dry to touch with no signs of erythema, ecchymosis, infection  MMT: 5/5 throughout  No soft tissue swelling or effusion noted  Full FDS, FDP, extensor mechanisms are intact  - Thenar atrophy, - intrinsic atrophy  + Tinel's at carpal tunnel  - Phalen's sign  + Carpal Tunnel Compression  - Finklestein  Demonstrates normal wrist, elbow, and shoulder motion  Forearm compartments are soft and supple  2+ Distal radial pulse with brisk capillary refill to the fingers  Radial, median, ulnar motor and sensory distribution intact  Sensation to light touch intact distally    2 pt discrimination: Thumb- R 7 mm, IF- R 6 mm, MF- R 5 mm, RF- R 5 mm, SF- R 5 mm  _______________________________________________  STUDIES REVIEWED:  I have personally reviewed AP lateral and oblique radiographs of US MSK limited which demonstrate R Carpal tunnel syndrome ruled in based on marked abnormal CSA > 14 sq mm      PROCEDURES PERFORMED:  Procedures  No Procedures performed today    _____________________________________________________      Grace Begin    I,:  Rogelio Lau am acting as a scribe while in the presence of the attending physician :       I,:  Aparna Darling MD personally performed the services described in this documentation    as scribed in my presence :

## 2023-02-03 NOTE — PRE-PROCEDURE INSTRUCTIONS
Pre-Surgery Instructions:   Medication Instructions   • Prenatal MV-Min-Fe Fum-FA-DHA (PRENATAL 1 PO) Stop taking 7 days prior to surgery  Have you had / have a sore throat? No  Have you had / have a cough less than 1 week? No  Have you had / have a fever greater than 100 0 - 100  4? No  Are you experiencing any shortness of breath? No    Review with patient via phone medications and showering instructions  Instructed to avoid all ASA and OTC Vit/Supp 1 week prior to surgery and to avoid NSAIDs 3 days prior to surgery per anesthesia instructions  Tylenol ok to take prn  [facility] call with surgery schedule time, nothing eat or drink after midnight  Verbalized understanding

## 2023-02-08 ENCOUNTER — ANESTHESIA EVENT (OUTPATIENT)
Dept: PERIOP | Facility: HOSPITAL | Age: 33
End: 2023-02-08

## 2023-02-10 ENCOUNTER — HOSPITAL ENCOUNTER (OUTPATIENT)
Facility: HOSPITAL | Age: 33
Setting detail: OUTPATIENT SURGERY
Discharge: HOME | End: 2023-02-10
Attending: SURGERY | Admitting: SURGERY

## 2023-02-10 ENCOUNTER — ANESTHESIA (OUTPATIENT)
Dept: PERIOP | Facility: HOSPITAL | Age: 33
End: 2023-02-10

## 2023-02-10 VITALS
TEMPERATURE: 98.1 F | WEIGHT: 138.01 LBS | OXYGEN SATURATION: 99 % | SYSTOLIC BLOOD PRESSURE: 113 MMHG | BODY MASS INDEX: 27.09 KG/M2 | RESPIRATION RATE: 16 BRPM | HEART RATE: 69 BPM | HEIGHT: 60 IN | DIASTOLIC BLOOD PRESSURE: 55 MMHG

## 2023-02-10 DIAGNOSIS — Z48.89 AFTERCARE FOLLOWING SURGERY: Primary | ICD-10-CM

## 2023-02-10 LAB
EXT PREGNANCY TEST URINE: NEGATIVE
EXT. CONTROL: NORMAL

## 2023-02-10 RX ORDER — ACETAMINOPHEN 325 MG/1
650 TABLET ORAL EVERY 6 HOURS PRN
Status: DISCONTINUED | OUTPATIENT
Start: 2023-02-10 | End: 2023-02-10 | Stop reason: HOSPADM

## 2023-02-10 RX ORDER — ONDANSETRON 2 MG/ML
INJECTION INTRAMUSCULAR; INTRAVENOUS AS NEEDED
Status: DISCONTINUED | OUTPATIENT
Start: 2023-02-10 | End: 2023-02-10

## 2023-02-10 RX ORDER — FENTANYL CITRATE 50 UG/ML
INJECTION, SOLUTION INTRAMUSCULAR; INTRAVENOUS AS NEEDED
Status: DISCONTINUED | OUTPATIENT
Start: 2023-02-10 | End: 2023-02-10

## 2023-02-10 RX ORDER — SODIUM CHLORIDE, SODIUM LACTATE, POTASSIUM CHLORIDE, CALCIUM CHLORIDE 600; 310; 30; 20 MG/100ML; MG/100ML; MG/100ML; MG/100ML
125 INJECTION, SOLUTION INTRAVENOUS CONTINUOUS
Status: DISCONTINUED | OUTPATIENT
Start: 2023-02-10 | End: 2023-02-10 | Stop reason: HOSPADM

## 2023-02-10 RX ORDER — MAGNESIUM HYDROXIDE 1200 MG/15ML
LIQUID ORAL AS NEEDED
Status: DISCONTINUED | OUTPATIENT
Start: 2023-02-10 | End: 2023-02-10 | Stop reason: HOSPADM

## 2023-02-10 RX ORDER — ACETAMINOPHEN AND CODEINE PHOSPHATE 300; 30 MG/1; MG/1
1 TABLET ORAL EVERY 12 HOURS PRN
Qty: 5 TABLET | Refills: 0 | Status: SHIPPED | OUTPATIENT
Start: 2023-02-10

## 2023-02-10 RX ORDER — PROPOFOL 10 MG/ML
INJECTION, EMULSION INTRAVENOUS AS NEEDED
Status: DISCONTINUED | OUTPATIENT
Start: 2023-02-10 | End: 2023-02-10

## 2023-02-10 RX ORDER — MIDAZOLAM HYDROCHLORIDE 2 MG/2ML
INJECTION, SOLUTION INTRAMUSCULAR; INTRAVENOUS AS NEEDED
Status: DISCONTINUED | OUTPATIENT
Start: 2023-02-10 | End: 2023-02-10

## 2023-02-10 RX ORDER — OXYCODONE HYDROCHLORIDE 5 MG/1
5 TABLET ORAL EVERY 4 HOURS PRN
Status: DISCONTINUED | OUTPATIENT
Start: 2023-02-10 | End: 2023-02-10 | Stop reason: HOSPADM

## 2023-02-10 RX ORDER — PROPOFOL 10 MG/ML
INJECTION, EMULSION INTRAVENOUS CONTINUOUS PRN
Status: DISCONTINUED | OUTPATIENT
Start: 2023-02-10 | End: 2023-02-10

## 2023-02-10 RX ORDER — LIDOCAINE HYDROCHLORIDE AND EPINEPHRINE 10; 10 MG/ML; UG/ML
INJECTION, SOLUTION INFILTRATION; PERINEURAL AS NEEDED
Status: DISCONTINUED | OUTPATIENT
Start: 2023-02-10 | End: 2023-02-10 | Stop reason: HOSPADM

## 2023-02-10 RX ORDER — HYDROMORPHONE HCL/PF 1 MG/ML
0.5 SYRINGE (ML) INJECTION
Status: DISCONTINUED | OUTPATIENT
Start: 2023-02-10 | End: 2023-02-10 | Stop reason: HOSPADM

## 2023-02-10 RX ORDER — FENTANYL CITRATE/PF 50 MCG/ML
25 SYRINGE (ML) INJECTION
Status: DISCONTINUED | OUTPATIENT
Start: 2023-02-10 | End: 2023-02-10 | Stop reason: HOSPADM

## 2023-02-10 RX ORDER — MEPERIDINE HYDROCHLORIDE 25 MG/ML
12.5 INJECTION INTRAMUSCULAR; INTRAVENOUS; SUBCUTANEOUS
Status: DISCONTINUED | OUTPATIENT
Start: 2023-02-10 | End: 2023-02-10 | Stop reason: HOSPADM

## 2023-02-10 RX ORDER — BUPIVACAINE HYDROCHLORIDE AND EPINEPHRINE 2.5; 5 MG/ML; UG/ML
INJECTION, SOLUTION EPIDURAL; INFILTRATION; INTRACAUDAL; PERINEURAL AS NEEDED
Status: DISCONTINUED | OUTPATIENT
Start: 2023-02-10 | End: 2023-02-10 | Stop reason: HOSPADM

## 2023-02-10 RX ORDER — ONDANSETRON 2 MG/ML
4 INJECTION INTRAMUSCULAR; INTRAVENOUS ONCE AS NEEDED
Status: DISCONTINUED | OUTPATIENT
Start: 2023-02-10 | End: 2023-02-10 | Stop reason: HOSPADM

## 2023-02-10 RX ADMIN — ONDANSETRON 4 MG: 2 INJECTION INTRAMUSCULAR; INTRAVENOUS at 09:22

## 2023-02-10 RX ADMIN — FENTANYL CITRATE 50 MCG: 50 INJECTION INTRAMUSCULAR; INTRAVENOUS at 09:36

## 2023-02-10 RX ADMIN — PROPOFOL 30 MG: 10 INJECTION, EMULSION INTRAVENOUS at 09:22

## 2023-02-10 RX ADMIN — SODIUM CHLORIDE, SODIUM LACTATE, POTASSIUM CHLORIDE, AND CALCIUM CHLORIDE 125 ML/HR: .6; .31; .03; .02 INJECTION, SOLUTION INTRAVENOUS at 08:21

## 2023-02-10 RX ADMIN — PROPOFOL 70 MCG/KG/MIN: 10 INJECTION, EMULSION INTRAVENOUS at 09:22

## 2023-02-10 RX ADMIN — MIDAZOLAM 2 MG: 1 INJECTION INTRAMUSCULAR; INTRAVENOUS at 09:17

## 2023-02-10 RX ADMIN — FENTANYL CITRATE 50 MCG: 50 INJECTION INTRAMUSCULAR; INTRAVENOUS at 09:17

## 2023-02-10 NOTE — ANESTHESIA POSTPROCEDURE EVALUATION
Post-Op Assessment Note    CV Status:  Stable    Pain management: adequate     Mental Status:  Alert and awake   Hydration Status:  Euvolemic   PONV Controlled:  Controlled   Airway Patency:  Patent      Post Op Vitals Reviewed: Yes      Staff: Anesthesiologist         No notable events documented      BP 98/57 (02/10/23 0955)    Temp     Pulse 72 (02/10/23 0955)   Resp 18 (02/10/23 0955)    SpO2 98 % (02/10/23 0955)

## 2023-02-10 NOTE — INTERVAL H&P NOTE
H&P reviewed  After examining the patient I find no changes in the patients condition since the H&P had been written      Vitals:    02/10/23 0813   BP: 111/65   Pulse: 65   Resp: 20   Temp: 97 6 °F (36 4 °C)   SpO2: 98%

## 2023-02-10 NOTE — OP NOTE
OPERATIVE REPORT  PATIENT NAME: Dimitris Vazquez    :  1990  MRN: [id number]  Pt Location: [facility] OR ROOM 06    SURGERY DATE: 2/10/2023    Surgeon(s) and Role:     Maral Jacome MD - Primary    Preop Diagnosis:  Carpal tunnel syndrome of right wrist [G56 01]    Post-Op Diagnosis Codes:     * Carpal tunnel syndrome of right wrist [G56 01]    Procedure(s):  Right - CTR  RIGHT    Specimen(s):  * No specimens in log *    Estimated Blood Loss:   Minimal    Drains:  * No LDAs found *    Anesthesia Type:   IV Sedation with Anesthesia    Operative Indications:  Carpal tunnel syndrome of right wrist [G56 01]      Operative Findings:  None    Complications:   None    Procedure and Technique:  The patient's right hand was cleansed with alcohol  A field block was performed using marcaine 0 25% with epinephrine and lidocaine 1% with epinephrine in a 50-50 mixture  The right upper extremity was prepped and draped in a sterile fashion  A longitudinal incision was made overlying the transverse carpal ligament in line with the ring finger in a flexed position  Sharp dissection was performed down to the level of the transverse carpal ligament  Esdras Jessica was used for counterretraction  The transverse carpal ligament was divided with a 15 blade scalpel distally, and tenotomy scissors proximally along with a portion of the distal antebrachial fascia  The wound was irrigated copiously with normal saline  The skin was approximated with 4-0 nylon in an interrupted horizontal mattress fashion  Xeroform was applied followed by gauze and an ace bandage over wrap  The patient was transferred to recovery room in stable condition        I was present for the entire procedure    Patient Disposition:  PACU         SIGNATURE: Heladio Newman MD  DATE: February 10, 2023  TIME: 9:41 AM

## 2023-02-10 NOTE — ANESTHESIA PREPROCEDURE EVALUATION
Procedure:  CTR, RIGHT (Right: Wrist)    Relevant Problems   CARDIO   (+) Hypertension      Other   (+) HSV (herpes simplex virus) infection        Physical Exam    Airway    Mallampati score: II  TM Distance: >3 FB  Neck ROM: full     Dental   No notable dental hx     Cardiovascular  Rhythm: regular, Rate: normal, Cardiovascular exam normal    Pulmonary  Pulmonary exam normal Breath sounds clear to auscultation,     Other Findings        Anesthesia Plan  ASA Score- 2     Anesthesia Type- IV sedation with anesthesia with ASA Monitors  Additional Monitors:   Airway Plan:     Comment: GA prn  Breast feeding 5 mo girl  Plan Factors-    Chart reviewed  Existing labs reviewed  Patient summary reviewed  Patient is not a current smoker  Patient not instructed to abstain from smoking on day of procedure  Patient did not smoke on day of surgery  There is medical exclusion for perioperative obstructive sleep apnea risk education  Induction- intravenous  Postoperative Plan-     Informed Consent- Anesthetic plan and risks discussed with patient

## 2023-02-10 NOTE — DISCHARGE INSTR - AVS FIRST PAGE
Elevate hand above heart as much as possible to help pain and swelling  May use hand for simple tasks, but no heavy lifting or tight squeezing x 4 weeks  Keep operative bandage clean and dry  You may remove bandage in 4 days, just place a band-aid over incision  You are permitted to shower after 4 days with band-aid off  Perform simple finger motion exercises: opening & closing fingers 10 x every hr  Follow-up in the office per your scheduled post-op appointment 2/20/2023

## 2023-02-20 ENCOUNTER — OFFICE VISIT (OUTPATIENT)
Dept: OBGYN CLINIC | Facility: MEDICAL CENTER | Age: 33
End: 2023-02-20

## 2023-02-20 VITALS
HEART RATE: 86 BPM | SYSTOLIC BLOOD PRESSURE: 126 MMHG | BODY MASS INDEX: 27.09 KG/M2 | WEIGHT: 138 LBS | HEIGHT: 60 IN | DIASTOLIC BLOOD PRESSURE: 86 MMHG

## 2023-02-20 DIAGNOSIS — G56.01 CARPAL TUNNEL SYNDROME OF RIGHT WRIST: Primary | ICD-10-CM

## 2023-03-14 ENCOUNTER — TELEPHONE (OUTPATIENT)
Dept: OBGYN CLINIC | Facility: CLINIC | Age: 33
End: 2023-03-14

## 2023-03-29 NOTE — PROGRESS NOTES
Additional message left for the patient to return our call.    Patient received 28 week teaching and patient declined TDAP  Breast pump was ordered on July 14th, 2022

## 2023-06-20 ENCOUNTER — TELEPHONE (OUTPATIENT)
Dept: POSTPARTUM | Facility: CLINIC | Age: 33
End: 2023-06-20

## 2023-06-20 NOTE — TELEPHONE ENCOUNTER
"Jaylene Brown has always preferred the breast over a bottle  Typically it is not an issue as Sherita Valenzuela is with her most of the time  However, when Sherita Valenzuela works 1 8 hour shift per week, Jaylene Brown will only take about 2 ounces of breastmilk the entire time  She will take water from a cup and is eating other foods as well  I reassured Sherita Valenzuela that at this point, she can offer expressed milk from a cup  Or offer \"breastmilk popsicles\"  I explained that Jaylene Brown can \"make up\" for missed feedings when Sherita Valenzuela gets home  I encouraged her not to force Bell to accept a bottle  I encouraged her to call with any questions or concerns    "

## 2023-07-31 ENCOUNTER — OFFICE VISIT (OUTPATIENT)
Dept: OBGYN CLINIC | Facility: CLINIC | Age: 33
End: 2023-07-31

## 2023-07-31 VITALS
HEART RATE: 80 BPM | SYSTOLIC BLOOD PRESSURE: 109 MMHG | HEIGHT: 60 IN | WEIGHT: 138.6 LBS | BODY MASS INDEX: 27.21 KG/M2 | DIASTOLIC BLOOD PRESSURE: 75 MMHG

## 2023-07-31 DIAGNOSIS — Z52.89 BREAST MILK DONOR: Primary | ICD-10-CM

## 2023-07-31 PROCEDURE — 99213 OFFICE O/P EST LOW 20 MIN: CPT | Performed by: OBSTETRICS & GYNECOLOGY

## 2023-07-31 NOTE — PROGRESS NOTES
Assessment/Plan:     No problem-specific Assessment & Plan notes found for this encounter. Diagnoses and all orders for this visit:    Breast milk donor      RTO for annual exam.          Subjective:      Patient ID: Cezar Christie is a 33 y.o. female who presents for clearance to become a breast milk donor. I reviewed her medical record and queried her to confirm the medical facts. She is doing well and is without complaint. She is still nursing her daughter. HPI    The following portions of the patient's history were reviewed and updated as appropriate: allergies, current medications, past family history, past medical history, past social history, past surgical history and problem list.    Review of Systems      Objective:      /75   Pulse 80   Ht 5' (1.524 m)   Wt 62.9 kg (138 lb 9.6 oz)   LMP 07/23/2023 (Exact Date)   BMI 27.07 kg/m²          Physical Exam  Vitals and nursing note reviewed. Constitutional:       Appearance: Normal appearance. Pulmonary:      Effort: Pulmonary effort is normal.   Neurological:      General: No focal deficit present. Mental Status: She is alert and oriented to person, place, and time.    Psychiatric:         Mood and Affect: Mood normal.         Behavior: Behavior normal. Banner Transposition Flap Text: The defect edges were debeveled with a #15 scalpel blade.  Given the location of the defect and the proximity to free margins a Banner transposition flap was deemed most appropriate.  Using a sterile surgical marker, an appropriate flap drawn around the defect. The area thus outlined was incised deep to adipose tissue with a #15 scalpel blade.  The skin margins were undermined to an appropriate distance in all directions utilizing iris scissors.

## 2023-10-18 ENCOUNTER — ULTRASOUND (OUTPATIENT)
Dept: OBGYN CLINIC | Facility: CLINIC | Age: 33
End: 2023-10-18

## 2023-10-18 VITALS
HEART RATE: 88 BPM | DIASTOLIC BLOOD PRESSURE: 74 MMHG | HEIGHT: 60 IN | BODY MASS INDEX: 26.7 KG/M2 | WEIGHT: 136 LBS | SYSTOLIC BLOOD PRESSURE: 110 MMHG

## 2023-10-18 DIAGNOSIS — N92.6 MISSED MENSES: Primary | ICD-10-CM

## 2023-10-18 DIAGNOSIS — Z32.01 POSITIVE PREGNANCY TEST: ICD-10-CM

## 2023-10-18 PROBLEM — B00.9 HSV (HERPES SIMPLEX VIRUS) INFECTION: Status: RESOLVED | Noted: 2021-06-24 | Resolved: 2023-10-18

## 2023-10-18 PROBLEM — R87.810 ATYPICAL SQUAMOUS CELL CHANGES OF UNDETERMINED SIGNIFICANCE (ASCUS) ON CERVICAL CYTOLOGY WITH POSITIVE HIGH RISK HUMAN PAPILLOMA VIRUS (HPV): Status: RESOLVED | Noted: 2020-11-04 | Resolved: 2023-10-18

## 2023-10-18 PROBLEM — R87.610 ATYPICAL SQUAMOUS CELL CHANGES OF UNDETERMINED SIGNIFICANCE (ASCUS) ON CERVICAL CYTOLOGY WITH POSITIVE HIGH RISK HUMAN PAPILLOMA VIRUS (HPV): Status: RESOLVED | Noted: 2020-11-04 | Resolved: 2023-10-18

## 2023-10-18 LAB — SL AMB POCT URINE HCG: POSITIVE

## 2023-10-18 RX ORDER — VITAMIN A ACETATE, .BETA.-CAROTENE, ASCORBIC ACID, CHOLECALCIFEROL, .ALPHA.-TOCOPHEROL ACETATE, DL-, THIAMINE MONONITRATE, RIBOFLAVIN, NIACINAMIDE, PYRIDOXINE HYDROCHLORIDE, FOLIC ACID, CYANOCOBALAMIN, CALCIUM CARBONATE, FERROUS FUMARATE, ZINC OXIDE, CUPRIC OXIDE 9.9; 120; 920; 200; 400; 2; 12; 27; 1; 20; 10; 3; 1.84; 3080; 25 MG/1; MG/1; [IU]/1; MG/1; [IU]/1; MG/1; UG/1; MG/1; MG/1; MG/1; MG/1; MG/1; MG/1; [IU]/1; MG/1
1 TABLET, FILM COATED ORAL DAILY
Qty: 90 TABLET | Refills: 4 | Status: SHIPPED | OUTPATIENT
Start: 2023-10-18

## 2023-10-18 NOTE — PATIENT INSTRUCTIONS
Thank you for your confidence in our team.   We appreciate you and welcome your feedback. If you receive a survey from us, please take a few moments to let us know how we are doing.    Sincerely,  Livan Harden

## 2023-10-18 NOTE — PROGRESS NOTES
Sana Buckley is a G4  who presents today for viability/dating ultrasound. Patient's last menstrual period was 08/27/2023 (exact date). She reports positive pregnancy test at home on 9/27/2023. She denies vaginal bleeding or abdominal/pelvic pain. /74   Pulse 88   Ht 5' (1.524 m)   Wt 61.7 kg (136 lb)   LMP 08/27/2023 (Exact Date)   BMI 26.56 kg/m²   Abdomen soft and non-tender. Transvaginal Pelvic Ultrasound:  # of fetuses: 1  Intrauterine: yes  Average CRL: 0.47cm (6w1d)  EDC based on CRL: 6/11/2024  Fetal cardiac activity: present  FHR: 109  Additional Findings: + yolk sac    The gestational age by LMP is </= 8w 6d and demonstrates more than 5 days days difference from the gestational age by [facility], therefore the final SERA will be based on the ultrasound at this encounter. Final SERA: 6/11/2024 by ultrasound at this encounter. Return in 2 weeks for repeat viability ultrasound. Rx prenatal vitamins sent to pharmacy.     Current Outpatient Medications   Medication Instructions    Prenatal Vit-Fe Fumarate-FA (Prenatal Plus Vitamin/Mineral) 27-1 MG TABS 1 tablet, Oral, Daily       [facility]in Daly, [address] [place] [address]  [address] [address]  Dept: [phone]  Dept Fax: [phone]: [phone]  [address] [place] [address]: [phone]  [address] [place] [address] Fax: [phone]

## 2023-11-06 ENCOUNTER — ULTRASOUND (OUTPATIENT)
Dept: OBGYN CLINIC | Facility: CLINIC | Age: 33
End: 2023-11-06

## 2023-11-06 VITALS
WEIGHT: 137 LBS | SYSTOLIC BLOOD PRESSURE: 123 MMHG | DIASTOLIC BLOOD PRESSURE: 84 MMHG | HEIGHT: 60 IN | BODY MASS INDEX: 26.9 KG/M2 | HEART RATE: 90 BPM

## 2023-11-06 DIAGNOSIS — Z32.01 POSITIVE PREGNANCY TEST: Primary | ICD-10-CM

## 2023-11-06 NOTE — PROGRESS NOTES
Gregoria Back is a G4  who presents today for repeat viability/dating ultrasound. Patient's last menstrual period was 08/27/2023 (exact date). She was seen by me here on 10/18/2023 for viability/dating ultrasound and SIUP was noted with CRL consistent with 6w1d and FHR of 109, but measurements were very difficult to obtain due to early gestational age. She denies vaginal bleeding or abdominal/pelvic pain. LMP 08/27/2023 (Exact Date)   Abdomen soft and non-tender. Transvaginal Pelvic Ultrasound:  # of fetuses: 1  Intrauterine: yes  Average CRL: 1.95cm (8w4d)  EDC based on CRL: 6/13/2024  Fetal cardiac activity: present  FHR: 174  Additional Findings: + yolk sac, + gross fetal movement    Final SERA: 6/11/2024 by ultrasound at the 10/18/2023 encounter. She indicates that she is relocating to [place] in a few weeks and will be continuing her prenatal care there. No follow up to be scheduled here. Continue with medications as below.     Current Outpatient Medications   Medication Instructions    Prenatal Vit-Fe Fumarate-FA (Prenatal Plus Vitamin/Mineral) 27-1 MG TABS 1 tablet, Oral, Daily       [address] [address]  Dept: [phone]  Dept Fax: [phone]: [phone]  [address] [place] [address]: [phone]  [address] [place] [address] Fax: [phone]

## 2023-11-06 NOTE — PATIENT INSTRUCTIONS
Thank you for your confidence in our team.   We appreciate you and welcome your feedback. If you receive a survey from us, please take a few moments to let us know how we are doing.    Sincerely,  Prerna Allan

## 2024-01-01 NOTE — TELEPHONE ENCOUNTER
Ally Busby returned your call
Joon Mane left message - her son now 2wks old was born 7wks early - she is pumping & nursing - has supply questions 
Left msg to return call
Lupe Mcgee has been scheduled 
Nuvia Hu returned your call
Nuvia Large    Son born at 35 weeks due to preeclampsia    Pumping since hospital    Only getting an oz from pumping    Putting baby to the breast 10 min; falls asleep at the breast    Follow up with formula    Has the Medela   Education provided on cycle and hands on pumping techniques    Encouraged to bring baby back to breast for non-nutritive suck and signs of satiation    [facility] and [facility]
congestion

## (undated) DEVICE — INTENDED FOR TISSUE SEPARATION, AND OTHER PROCEDURES THAT REQUIRE A SHARP SURGICAL BLADE TO PUNCTURE OR CUT.: Brand: BARD-PARKER ® CARBON RIB-BACK BLADES

## (undated) DEVICE — NEEDLE 18 G X 1 1/2

## (undated) DEVICE — SUT ETHILON 4-0 PS-2 18 IN 1667H

## (undated) DEVICE — GLOVE INDICATOR PI UNDERGLOVE SZ 8 BLUE

## (undated) DEVICE — STERILE BETHLEHEM PLASTIC HAND: Brand: CARDINAL HEALTH

## (undated) DEVICE — SYRINGE 10ML LL

## (undated) DEVICE — GLOVE INDICATOR PI UNDERGLOVE SZ 7.5 BLUE

## (undated) DEVICE — ACE WRAP 3 IN UNSTERILE

## (undated) DEVICE — NEEDLE HYPO 22G X 1-1/2 IN

## (undated) DEVICE — OCCLUSIVE GAUZE STRIP,3% BISMUTH TRIBROMOPHENATE IN PETROLATUM BLEND: Brand: XEROFORM

## (undated) DEVICE — GLOVE SRG BIOGEL 7.5

## (undated) DEVICE — GAUZE SPONGES,16 PLY: Brand: CURITY